# Patient Record
Sex: FEMALE | Race: WHITE | NOT HISPANIC OR LATINO | Employment: OTHER | RURAL
[De-identification: names, ages, dates, MRNs, and addresses within clinical notes are randomized per-mention and may not be internally consistent; named-entity substitution may affect disease eponyms.]

---

## 2020-12-07 ENCOUNTER — HISTORICAL (OUTPATIENT)
Dept: ADMINISTRATIVE | Facility: HOSPITAL | Age: 65
End: 2020-12-07

## 2021-11-19 VITALS
BODY MASS INDEX: 14.99 KG/M2 | HEIGHT: 65 IN | WEIGHT: 90 LBS | SYSTOLIC BLOOD PRESSURE: 142 MMHG | HEART RATE: 93 BPM | DIASTOLIC BLOOD PRESSURE: 78 MMHG

## 2022-01-25 ENCOUNTER — OFFICE VISIT (OUTPATIENT)
Dept: CARDIOLOGY | Facility: CLINIC | Age: 67
End: 2022-01-25
Payer: MEDICARE

## 2022-01-25 VITALS
HEIGHT: 65 IN | WEIGHT: 82 LBS | DIASTOLIC BLOOD PRESSURE: 74 MMHG | SYSTOLIC BLOOD PRESSURE: 112 MMHG | OXYGEN SATURATION: 96 % | BODY MASS INDEX: 13.66 KG/M2 | HEART RATE: 73 BPM

## 2022-01-25 DIAGNOSIS — I10 HYPERTENSION, UNSPECIFIED TYPE: ICD-10-CM

## 2022-01-25 DIAGNOSIS — I25.10 CORONARY ARTERY DISEASE, UNSPECIFIED VESSEL OR LESION TYPE, UNSPECIFIED WHETHER ANGINA PRESENT, UNSPECIFIED WHETHER NATIVE OR TRANSPLANTED HEART: Primary | ICD-10-CM

## 2022-01-25 DIAGNOSIS — E78.5 HYPERLIPIDEMIA, UNSPECIFIED HYPERLIPIDEMIA TYPE: ICD-10-CM

## 2022-01-25 DIAGNOSIS — I25.10 CORONARY ARTERY DISEASE INVOLVING NATIVE CORONARY ARTERY OF NATIVE HEART WITHOUT ANGINA PECTORIS: ICD-10-CM

## 2022-01-25 DIAGNOSIS — I25.2 HISTORY OF NON-ST ELEVATION MYOCARDIAL INFARCTION (NSTEMI): ICD-10-CM

## 2022-01-25 PROCEDURE — 99214 OFFICE O/P EST MOD 30 MIN: CPT | Mod: S$PBB,,, | Performed by: NURSE PRACTITIONER

## 2022-01-25 PROCEDURE — 93010 EKG 12-LEAD: ICD-10-PCS | Mod: S$PBB,,, | Performed by: INTERNAL MEDICINE

## 2022-01-25 PROCEDURE — 93005 ELECTROCARDIOGRAM TRACING: CPT | Mod: PBBFAC | Performed by: INTERNAL MEDICINE

## 2022-01-25 PROCEDURE — 99213 OFFICE O/P EST LOW 20 MIN: CPT | Mod: PBBFAC | Performed by: NURSE PRACTITIONER

## 2022-01-25 PROCEDURE — 99214 PR OFFICE/OUTPT VISIT, EST, LEVL IV, 30-39 MIN: ICD-10-PCS | Mod: S$PBB,,, | Performed by: NURSE PRACTITIONER

## 2022-01-25 PROCEDURE — 93010 ELECTROCARDIOGRAM REPORT: CPT | Mod: S$PBB,,, | Performed by: INTERNAL MEDICINE

## 2022-01-25 RX ORDER — TRAMADOL HYDROCHLORIDE 50 MG/1
50 TABLET ORAL EVERY 6 HOURS PRN
COMMUNITY
Start: 2022-01-21 | End: 2023-03-14 | Stop reason: SDUPTHER

## 2022-01-25 RX ORDER — ALPRAZOLAM 0.5 MG/1
0.5 TABLET ORAL NIGHTLY
COMMUNITY
Start: 2022-01-21 | End: 2023-05-16 | Stop reason: SDUPTHER

## 2022-01-25 NOTE — PROGRESS NOTES
"[facility] note        DATE OF SERVICE: 2022       PCP: Primary Doctor No      CHIEF COMPLAINT:   Chief Complaint   Patient presents with    Follow-up     Patient denies any cardiac complaints today.        HISTORY OF PRESENT ILLNESS:  Lorraine Brambila is a 66 y.o. female with a PMH of   Past Medical History:   Diagnosis Date    Asthma     COPD (chronic obstructive pulmonary disease)     Coronary artery disease     Fibromyalgia     Hyperlipidemia     Hypertension      who presents for routine follow up. She continues to lose weight. She states she has been "all over [place], [place] to all kinds of specialists and no one can find out why I eat all the time and keep losing weight.."  Chief Complaint   Patient presents with    Follow-up     Patient denies any cardiac complaints today.            PAST MEDICAL HISTORY:  Past Medical History:   Diagnosis Date    Asthma     COPD (chronic obstructive pulmonary disease)     Coronary artery disease     Fibromyalgia     Hyperlipidemia     Hypertension        PAST SURGICAL HISTORY:  Past Surgical History:   Procedure Laterality Date    CARDIAC CATHETERIZATION      HERNIA REPAIR      HYSTERECTOMY         SOCIAL HISTORY:  Social History     Socioeconomic History    Marital status:    Tobacco Use    Smoking status: Former Smoker     Packs/day: 1.00     Years: 50.00     Pack years: 50.00     Types: Cigarettes     Quit date: 2019     Years since quittin.8    Smokeless tobacco: Never Used   Substance and Sexual Activity    Alcohol use: Never    Drug use: Never       FAMILY HISTORY:  Family History   Problem Relation Age of Onset    Heart attack Father     Heart disease Father          ALLERGIES:  Review of patient's allergies indicates:  No Known Allergies     MEDICATIONS:    Current Outpatient Medications:     ALPRAZolam (XANAX) 0.5 MG tablet, , Disp: , Rfl:     traMADoL (ULTRAM) 50 mg tablet, , Disp: , Rfl:   Medications " "have not been reviewed and reconciled. She is to call back when she gets home to do this.     PHYSICAL EXAM:  /74 (BP Location: Right arm, Patient Position: Sitting)   Pulse 73   Ht 5' 5" (1.651 m)   Wt 37.2 kg (82 lb)   SpO2 96%   BMI 13.65 kg/m²   Wt Readings from Last 3 Encounters:   01/25/22 37.2 kg (82 lb)   11/30/20 40.8 kg (90 lb)      Body mass index is 13.65 kg/m².    Physical Exam  Vitals and nursing note reviewed.   Constitutional:       Appearance: Normal appearance.      Comments: Under weight   HENT:      Head: Normocephalic and atraumatic.   Eyes:      Pupils: Pupils are equal, round, and reactive to light.   Neck:      Vascular: No carotid bruit.   Cardiovascular:      Rate and Rhythm: Normal rate and regular rhythm.      Pulses: Normal pulses.      Heart sounds: Normal heart sounds.   Pulmonary:      Effort: Pulmonary effort is normal.      Breath sounds: Normal breath sounds.   Abdominal:      General: Bowel sounds are normal.      Palpations: Abdomen is soft.   Musculoskeletal:      Cervical back: Neck supple.      Right lower leg: No edema.      Left lower leg: No edema.   Skin:     General: Skin is warm and dry.      Capillary Refill: Capillary refill takes less than 2 seconds.   Neurological:      General: No focal deficit present.      Mental Status: She is alert and oriented to person, place, and time.   Psychiatric:         Mood and Affect: Mood normal.         Behavior: Behavior normal.         LABS REVIEWED:  No results found for: WBC, RBC, HGB, HCT, MCV, MCH, MCHC, RDW, PLT, MPV, NRBC, INR  No results found for: NA, K, CL, CO2, BUN, MG, PHOS  No results found for: CPK, AST, ALT  No results found for: GLU, HGBA1C  No results found for: CHOL, HDL, TRIG, CHOLHDL    CARDIAC STUDIES REVIEWED:EKG:RSR; HR 72 bpm; normal EKG      ASSESSMENT:   Patient Active Problem List   Diagnosis    Coronary artery disease    History of non-ST elevation myocardial infarction (NSTEMI)    " Hyperlipidemia    Hypertension            Problem List Items Addressed This Visit        Cardiac/Vascular    Coronary artery disease - Primary    Overview     KARYN mid LCx 4/2019         Relevant Orders    EKG 12-lead    History of non-ST elevation myocardial infarction (NSTEMI)    Overview     4/2019         Hyperlipidemia    Hypertension           PLAN:  Orders Placed This Encounter   Procedures    EKG 12-lead     Standing Status:   Future     Standing Expiration Date:   1/25/2023     Order Specific Question:   Diagnosis     Answer:   CAD (coronary artery disease) [[id number]]      RTC: 1 year

## 2022-05-04 PROCEDURE — 88305 TISSUE EXAM BY PATHOLOGIST: CPT | Mod: SUR

## 2022-05-04 PROCEDURE — 88305 TISSUE EXAM BY PATHOLOGIST: CPT | Mod: 26,,, | Performed by: PATHOLOGY

## 2022-05-04 PROCEDURE — 88305 PATHOLOGY, DERMATOLOGY: ICD-10-PCS | Mod: 26,,, | Performed by: PATHOLOGY

## 2022-05-05 ENCOUNTER — LAB REQUISITION (OUTPATIENT)
Dept: LAB | Facility: HOSPITAL | Age: 67
End: 2022-05-05
Payer: MEDICARE

## 2022-05-05 DIAGNOSIS — D49.2 NEOPLASM OF UNSPECIFIED BEHAVIOR OF BONE, SOFT TISSUE, AND SKIN: ICD-10-CM

## 2022-05-06 LAB
ESTROGEN SERPL-MCNC: NORMAL PG/ML
LAB AP GROSS DESCRIPTION: NORMAL
LAB AP LABORATORY NOTES: NORMAL
LAB AP SPEC A DDX: NORMAL
LAB AP SPEC A MORPHOLOGY: NORMAL
LAB AP SPEC A PROCEDURE: NORMAL
T3RU NFR SERPL: NORMAL %

## 2022-05-23 PROCEDURE — 88305 TISSUE EXAM BY PATHOLOGIST: CPT | Mod: SUR | Performed by: DERMATOLOGY

## 2022-05-23 PROCEDURE — 88305 TISSUE EXAM BY PATHOLOGIST: CPT | Mod: 26,,, | Performed by: PATHOLOGY

## 2022-05-23 PROCEDURE — 88305 PATHOLOGY, DERMATOLOGY: ICD-10-PCS | Mod: 26,,, | Performed by: PATHOLOGY

## 2022-05-24 ENCOUNTER — LAB REQUISITION (OUTPATIENT)
Dept: LAB | Facility: HOSPITAL | Age: 67
End: 2022-05-24
Attending: DERMATOLOGY
Payer: MEDICARE

## 2022-05-24 DIAGNOSIS — D04.4 CARCINOMA IN SITU OF SKIN OF SCALP AND NECK: ICD-10-CM

## 2023-01-30 ENCOUNTER — OFFICE VISIT (OUTPATIENT)
Dept: CARDIOLOGY | Facility: CLINIC | Age: 68
End: 2023-01-30
Payer: MEDICARE

## 2023-01-30 VITALS
SYSTOLIC BLOOD PRESSURE: 100 MMHG | BODY MASS INDEX: 13.6 KG/M2 | WEIGHT: 81.63 LBS | DIASTOLIC BLOOD PRESSURE: 60 MMHG | HEIGHT: 65 IN

## 2023-01-30 DIAGNOSIS — I25.10 CORONARY ARTERY DISEASE, UNSPECIFIED VESSEL OR LESION TYPE, UNSPECIFIED WHETHER ANGINA PRESENT, UNSPECIFIED WHETHER NATIVE OR TRANSPLANTED HEART: Primary | ICD-10-CM

## 2023-01-30 DIAGNOSIS — I10 HYPERTENSION, UNSPECIFIED TYPE: ICD-10-CM

## 2023-01-30 DIAGNOSIS — E78.5 HYPERLIPIDEMIA, UNSPECIFIED HYPERLIPIDEMIA TYPE: ICD-10-CM

## 2023-01-30 PROCEDURE — 99213 OFFICE O/P EST LOW 20 MIN: CPT | Mod: PBBFAC | Performed by: NURSE PRACTITIONER

## 2023-01-30 PROCEDURE — 99214 PR OFFICE/OUTPT VISIT, EST, LEVL IV, 30-39 MIN: ICD-10-PCS | Mod: S$PBB,,, | Performed by: NURSE PRACTITIONER

## 2023-01-30 PROCEDURE — 93005 ELECTROCARDIOGRAM TRACING: CPT | Mod: PBBFAC | Performed by: INTERNAL MEDICINE

## 2023-01-30 PROCEDURE — 99214 OFFICE O/P EST MOD 30 MIN: CPT | Mod: S$PBB,,, | Performed by: NURSE PRACTITIONER

## 2023-01-30 PROCEDURE — 93010 EKG 12-LEAD: ICD-10-PCS | Mod: S$PBB,,, | Performed by: INTERNAL MEDICINE

## 2023-01-30 PROCEDURE — 93010 ELECTROCARDIOGRAM REPORT: CPT | Mod: S$PBB,,, | Performed by: INTERNAL MEDICINE

## 2023-01-30 RX ORDER — METOPROLOL SUCCINATE 25 MG/1
25 TABLET, EXTENDED RELEASE ORAL DAILY
COMMUNITY
Start: 2022-11-06 | End: 2023-03-23 | Stop reason: SDUPTHER

## 2023-01-30 RX ORDER — CLOPIDOGREL BISULFATE 75 MG/1
75 TABLET ORAL DAILY
COMMUNITY
Start: 2022-11-06 | End: 2023-03-23 | Stop reason: SDUPTHER

## 2023-01-30 RX ORDER — ONDANSETRON 4 MG/1
4 TABLET, FILM COATED ORAL EVERY 8 HOURS PRN
COMMUNITY

## 2023-01-30 RX ORDER — MIRTAZAPINE 30 MG/1
30 TABLET, FILM COATED ORAL NIGHTLY
COMMUNITY
Start: 2022-11-06 | End: 2023-09-21

## 2023-01-30 RX ORDER — ATORVASTATIN CALCIUM 40 MG/1
40 TABLET, FILM COATED ORAL NIGHTLY
COMMUNITY
Start: 2022-11-06 | End: 2023-06-21 | Stop reason: SDUPTHER

## 2023-01-30 NOTE — PROGRESS NOTES
PCP: Primary Doctor No    Referring Provider:     Subjective:   Lorraine Brambila is a 67 y.o. female with hx of NSTEMI in 2019 with DEX prox LCx and residual disease of the RCA which did not appear to be r/t ischemia by Lexiscan 5/2019, who presents for routine follow up. She denies issues other than occasional, transient palpitations that has been ongoing for years.         Fhx:  Family History   Problem Relation Age of Onset    Heart attack Father     Heart disease Father       Shx:   Social History     Socioeconomic History    Marital status:    Tobacco Use    Smoking status: Former     Packs/day: 1.00     Years: 50.00     Pack years: 50.00     Types: Cigarettes     Quit date: 04/2019     Years since quitting: 3.8    Smokeless tobacco: Never   Substance and Sexual Activity    Alcohol use: Never    Drug use: Never        EKG 1/30/2023 RSR with HR 63 bpm; normal EKG  1/25/2022 RSR with HR 72 bpm; normal EKG    Lexiscan 5/21/2019  No definite findings of significant reversible ischemia can be detected by the study  Mild and fixed inferior wall defect wth no associated WMA suggestive of possible attenuation artifact appeared to be more likely than ischemia  Normal LV size and systolic function  LVEF 88%    ECHO 4/13/2019  Normal chamber size with possible normal LVSF with est LVEF 50%; poor quality no WMA could be assessed  Mild TR with est RVSP 36 mmHg  Pseudo-normalization of mitral inflow suggestive of LVDD  Increased LV filling pressure    [facility] 4/14/2019- Dr. Henderson  LM-short  LAD  with luminal irregularities; mLAD has 30% calcified plaque  LCx- mid LCx had 780% lesion with unstable plaque appearance   RCA is non-dominant and has a 95% prox lesion with remaining vessel diffusely diseased.  S/e KARYN mid Lcx with SURENDRA?I 3 flow post intervention      No results found for: NA, K, CL, CO2, BUN, CREATININE, CALCIUM, ANIONGAP, ESTGFRAFRICA, EGFRNONAA    No results found for: CHOL  No results found for: HDL  No results  "found for: LDLCALC  No results found for: TRIG  No results found for: CHOLHDL    No results found for: WBC, HGB, HCT, MCV, PLT        Current Outpatient Medications:     ALPRAZolam (XANAX) 0.5 MG tablet, Take 0.5 mg by mouth nightly., Disp: , Rfl:     atorvastatin (LIPITOR) 40 MG tablet, Take 40 mg by mouth every evening., Disp: , Rfl:     clopidogreL (PLAVIX) 75 mg tablet, Take 75 mg by mouth once daily., Disp: , Rfl:     metoprolol succinate (TOPROL-XL) 25 MG 24 hr tablet, Take 25 mg by mouth once daily., Disp: , Rfl:     mirtazapine (REMERON) 30 MG tablet, Take 30 mg by mouth every evening., Disp: , Rfl:     traMADoL (ULTRAM) 50 mg tablet, Take 50 mg by mouth every 6 (six) hours as needed., Disp: , Rfl:     ondansetron (ZOFRAN) 4 MG tablet, Take 4 mg by mouth every 8 (eight) hours as needed for Nausea., Disp: , Rfl:   Meds reviewed and reconciled.    Review of Systems   Respiratory:  Negative for shortness of breath.    Cardiovascular:  Positive for palpitations. Negative for chest pain, orthopnea, claudication, leg swelling and PND.   Neurological:  Negative for dizziness, loss of consciousness and weakness.       Objective:   /60 (BP Location: Left arm, Patient Position: Sitting)   Ht 5' 5" (1.651 m)   Wt 37 kg (81 lb 9.6 oz)   BMI 13.58 kg/m²     Physical Exam  Vitals and nursing note reviewed.   Constitutional:       Appearance: Normal appearance. She is normal weight.   HENT:      Head: Normocephalic and atraumatic.   Neck:      Vascular: No carotid bruit.   Cardiovascular:      Rate and Rhythm: Normal rate and regular rhythm.      Pulses: Normal pulses.      Heart sounds: Normal heart sounds.   Pulmonary:      Effort: Pulmonary effort is normal.      Breath sounds: Normal breath sounds.   Abdominal:      Palpations: Abdomen is soft.   Musculoskeletal:      Cervical back: Neck supple.      Right lower leg: No edema.      Left lower leg: No edema.   Skin:     General: Skin is warm and dry.      " Capillary Refill: Capillary refill takes less than 2 seconds.   Neurological:      Mental Status: She is alert.         Assessment:     1. Coronary artery disease, unspecified vessel or lesion type, unspecified whether angina present, unspecified whether native or transplanted heart  EKG 12-lead      2. Hyperlipidemia, unspecified hyperlipidemia type        3. Hypertension, unspecified type              Plan:   Coronary artery disease  Asymptomatic  Continue plavix/statin    Hyperlipidemia  Followed by PCP  Lipitor 40 mg po daily    Hypertension  Well controlled today 100/60 mmHg    RTC: 1 year

## 2023-03-14 ENCOUNTER — OFFICE VISIT (OUTPATIENT)
Dept: PRIMARY CARE CLINIC | Facility: CLINIC | Age: 68
End: 2023-03-14
Payer: MEDICARE

## 2023-03-14 ENCOUNTER — TELEPHONE (OUTPATIENT)
Dept: PRIMARY CARE CLINIC | Facility: CLINIC | Age: 68
End: 2023-03-14
Payer: MEDICARE

## 2023-03-14 VITALS
WEIGHT: 82 LBS | OXYGEN SATURATION: 97 % | BODY MASS INDEX: 13.66 KG/M2 | SYSTOLIC BLOOD PRESSURE: 100 MMHG | TEMPERATURE: 98 F | DIASTOLIC BLOOD PRESSURE: 70 MMHG | HEART RATE: 64 BPM | HEIGHT: 65 IN | RESPIRATION RATE: 16 BRPM

## 2023-03-14 DIAGNOSIS — Z11.59 NEED FOR HEPATITIS C SCREENING TEST: ICD-10-CM

## 2023-03-14 DIAGNOSIS — R39.15 URINARY URGENCY: ICD-10-CM

## 2023-03-14 DIAGNOSIS — Z00.00 ENCOUNTER FOR MEDICAL EXAMINATION TO ESTABLISH CARE: ICD-10-CM

## 2023-03-14 DIAGNOSIS — R10.2 PELVIC PRESSURE IN FEMALE: Primary | ICD-10-CM

## 2023-03-14 DIAGNOSIS — G89.29 OTHER CHRONIC PAIN: ICD-10-CM

## 2023-03-14 DIAGNOSIS — F11.90 CHRONIC, CONTINUOUS USE OF OPIOIDS: ICD-10-CM

## 2023-03-14 DIAGNOSIS — R63.6 UNDERWEIGHT: ICD-10-CM

## 2023-03-14 DIAGNOSIS — Z13.220 SCREENING FOR LIPID DISORDERS: ICD-10-CM

## 2023-03-14 DIAGNOSIS — R35.0 URINARY FREQUENCY: Primary | ICD-10-CM

## 2023-03-14 DIAGNOSIS — M81.0 OSTEOPOROSIS, UNSPECIFIED OSTEOPOROSIS TYPE, UNSPECIFIED PATHOLOGICAL FRACTURE PRESENCE: ICD-10-CM

## 2023-03-14 DIAGNOSIS — Z79.899 ENCOUNTER FOR LONG-TERM (CURRENT) USE OF MEDICATIONS: ICD-10-CM

## 2023-03-14 DIAGNOSIS — Z76.0 MEDICATION REFILL: ICD-10-CM

## 2023-03-14 DIAGNOSIS — Z12.31 ENCOUNTER FOR SCREENING MAMMOGRAM FOR MALIGNANT NEOPLASM OF BREAST: ICD-10-CM

## 2023-03-14 DIAGNOSIS — I25.2 HISTORY OF MYOCARDIAL INFARCTION: ICD-10-CM

## 2023-03-14 DIAGNOSIS — E78.5 HYPERLIPIDEMIA, UNSPECIFIED HYPERLIPIDEMIA TYPE: ICD-10-CM

## 2023-03-14 PROCEDURE — 99204 PR OFFICE/OUTPT VISIT, NEW, LEVL IV, 45-59 MIN: ICD-10-PCS | Mod: ,,, | Performed by: NURSE PRACTITIONER

## 2023-03-14 PROCEDURE — 99204 OFFICE O/P NEW MOD 45 MIN: CPT | Mod: ,,, | Performed by: NURSE PRACTITIONER

## 2023-03-14 RX ORDER — PANTOPRAZOLE SODIUM 40 MG/1
TABLET, DELAYED RELEASE ORAL
COMMUNITY
Start: 2022-11-06

## 2023-03-14 RX ORDER — TRAMADOL HYDROCHLORIDE 50 MG/1
50 TABLET ORAL EVERY 6 HOURS PRN
Qty: 28 TABLET | Refills: 0 | Status: SHIPPED | OUTPATIENT
Start: 2023-03-14 | End: 2023-05-16

## 2023-03-14 RX ORDER — TRAZODONE HYDROCHLORIDE 100 MG/1
100 TABLET ORAL NIGHTLY
Qty: 90 TABLET | Refills: 3 | Status: SHIPPED | OUTPATIENT
Start: 2023-03-14 | End: 2023-05-16

## 2023-03-14 NOTE — TELEPHONE ENCOUNTER
----- Message from GIUSEPPE Villalpando sent at 3/14/2023  4:01 PM CDT -----  UA clear. Ordering pelvic u/s for pelvic pressure/urinary urgency. Please schedule & let pt know.

## 2023-03-14 NOTE — PROGRESS NOTES
Pt is a 68 y/o WF here to establish care. Needs refills. Pt c/o OAB, states she has to urinate frequently. No incontinence. 2 vaginal deliveries. Will feel need to urinate but when she goes to restroom sometimes will not actually urinate. Pt reports cyst on kidney x15 years. Saw specialist in [place] who released her.    Past Medical History:   Diagnosis Date    Asthma     COPD (chronic obstructive pulmonary disease)     Coronary artery disease     Fibromyalgia     Hyperlipidemia     Hypertension      Review of Systems   Constitutional:  Negative for chills and fever.   Respiratory:  Negative for shortness of breath.    Cardiovascular:  Negative for chest pain.   Gastrointestinal:  Negative for abdominal pain, blood in stool, constipation, diarrhea, melena, nausea and vomiting.   Genitourinary:  Positive for frequency and urgency.   Skin:  Negative for rash.   Neurological:  Negative for weakness.   Psychiatric/Behavioral:  The patient has insomnia.      Physical Exam  Vitals reviewed. Exam conducted with a chaperone present.   Constitutional:       General: She is not in acute distress.     Appearance: Normal appearance.   HENT:      Head: Normocephalic.   Eyes:      General: No scleral icterus.     Pupils: Pupils are equal, round, and reactive to light.   Cardiovascular:      Rate and Rhythm: Normal rate.   Pulmonary:      Effort: Pulmonary effort is normal.   Abdominal:      General: There is no distension.      Palpations: Abdomen is soft.      Tenderness: There is no abdominal tenderness. There is no guarding or rebound.   Skin:     General: Skin is warm and dry.   Neurological:      General: No focal deficit present.      Mental Status: She is alert and oriented to person, place, and time. Mental status is at baseline.   Psychiatric:         Mood and Affect: Mood normal.         Behavior: Behavior normal.         Thought Content: Thought content normal.         Judgment: Judgment normal.     Plan  -labs as  ordered  -UA for urinary symptoms  -urology referral if UA neg and symptoms persist  -mammogram - last one 2 years ago here at [facility]  -DEXA - last one 2 years ago at [facility], hx osteoporosis, no hx of scoliosis  -refills as ordered. Pt wants to switch Remeron back to trazodone for sleep, states helped better in past. Stopping xanax to start trazodone. Pt also on Ultram. Wants 90 day supply with refills, states her daughter who is NP used to write for her. Instructed pt she needs to see pain tx for chronic narcotic refills, pt declines. Pt to let me know if she changes mind.  -RTC 3 months, sooner PRN

## 2023-03-17 ENCOUNTER — TELEPHONE (OUTPATIENT)
Dept: PRIMARY CARE CLINIC | Facility: CLINIC | Age: 68
End: 2023-03-17
Payer: MEDICARE

## 2023-03-17 NOTE — TELEPHONE ENCOUNTER
Pt notified of US appt.   Pt states she wanted AB to know that during her visit that she wasn't meaning to ask for a 90 day supply of ultram. She only wanted a 30 day supply. I told pt I would let AB know. Pt voiced understanding.

## 2023-03-23 ENCOUNTER — TELEPHONE (OUTPATIENT)
Dept: PRIMARY CARE CLINIC | Facility: CLINIC | Age: 68
End: 2023-03-23
Payer: MEDICARE

## 2023-03-23 RX ORDER — CLOPIDOGREL BISULFATE 75 MG/1
75 TABLET ORAL DAILY
Qty: 90 TABLET | Refills: 3 | Status: SHIPPED | OUTPATIENT
Start: 2023-03-23 | End: 2024-01-09 | Stop reason: SDUPTHER

## 2023-03-23 RX ORDER — METOPROLOL SUCCINATE 25 MG/1
25 TABLET, EXTENDED RELEASE ORAL DAILY
Qty: 90 TABLET | Refills: 3 | Status: SHIPPED | OUTPATIENT
Start: 2023-03-23 | End: 2023-12-12 | Stop reason: DRUGHIGH

## 2023-03-29 ENCOUNTER — HOSPITAL ENCOUNTER (OUTPATIENT)
Dept: RADIOLOGY | Facility: HOSPITAL | Age: 68
Discharge: HOME | End: 2023-03-29
Attending: NURSE PRACTITIONER
Payer: MEDICARE

## 2023-03-29 DIAGNOSIS — I71.40 ABDOMINAL AORTIC ANEURYSM (AAA) WITHOUT RUPTURE, UNSPECIFIED PART: ICD-10-CM

## 2023-03-29 DIAGNOSIS — I71.40 ABDOMINAL AORTIC ANEURYSM (AAA) WITHOUT RUPTURE, UNSPECIFIED PART: Primary | ICD-10-CM

## 2023-03-29 DIAGNOSIS — R39.15 URINARY URGENCY: ICD-10-CM

## 2023-03-29 DIAGNOSIS — R10.2 PELVIC PRESSURE IN FEMALE: ICD-10-CM

## 2023-03-29 PROCEDURE — 76856 US EXAM PELVIC COMPLETE: CPT | Mod: 26,,, | Performed by: RADIOLOGY

## 2023-03-29 PROCEDURE — 76856 US EXAM PELVIC COMPLETE: CPT | Mod: TC

## 2023-03-29 PROCEDURE — 76706 US ABDL AORTA SCREEN AAA: CPT | Mod: 26,,, | Performed by: RADIOLOGY

## 2023-03-29 PROCEDURE — 76706 US ABDL AORTA SCREEN AAA: CPT | Mod: TC

## 2023-03-29 PROCEDURE — 76706 US AAA SCREENING: ICD-10-PCS | Mod: 26,,, | Performed by: RADIOLOGY

## 2023-03-29 PROCEDURE — 76856 US PELVIS COMPLETE NON OB: ICD-10-PCS | Mod: 26,,, | Performed by: RADIOLOGY

## 2023-04-09 DIAGNOSIS — Z71.89 COMPLEX CARE COORDINATION: ICD-10-CM

## 2023-04-19 ENCOUNTER — OFFICE VISIT (OUTPATIENT)
Dept: SURGERY | Facility: CLINIC | Age: 68
End: 2023-04-19
Payer: MEDICARE

## 2023-04-19 DIAGNOSIS — I71.40 ABDOMINAL AORTIC ANEURYSM (AAA) WITHOUT RUPTURE, UNSPECIFIED PART: ICD-10-CM

## 2023-04-19 DIAGNOSIS — I71.43 INFRARENAL ABDOMINAL AORTIC ANEURYSM (AAA) WITHOUT RUPTURE: Primary | ICD-10-CM

## 2023-04-19 PROCEDURE — 99213 OFFICE O/P EST LOW 20 MIN: CPT | Mod: S$PBB,,, | Performed by: SURGERY

## 2023-04-19 PROCEDURE — 99213 PR OFFICE/OUTPT VISIT, EST, LEVL III, 20-29 MIN: ICD-10-PCS | Mod: S$PBB,,, | Performed by: SURGERY

## 2023-04-19 PROCEDURE — 99212 OFFICE O/P EST SF 10 MIN: CPT | Mod: PBBFAC | Performed by: SURGERY

## 2023-04-19 NOTE — PROGRESS NOTES
Subjective:       Patient ID: Lorraine Brambila is a 67 y.o. female.    Chief Complaint: No chief complaint on file.  Prefer for abdominal aortic aneurysms.  Ultrasound shows maximum diameter 2.9 cm.  She has a family history.  She said ultrasound for screening proximally aorto 1.9 mid aorta 2.9 distal aorta 2.7 right iliac 0.5 left iliac 0.7  family history includes Heart attack in her father; Heart disease in her father.  Past Medical History:   Diagnosis Date    Asthma     COPD (chronic obstructive pulmonary disease)     Coronary artery disease     Fibromyalgia     Hyperlipidemia     Hypertension       Past Surgical History:   Procedure Laterality Date    CARDIAC CATHETERIZATION      HERNIA REPAIR      HYSTERECTOMY         reports that she quit smoking about 4 years ago. Her smoking use included cigarettes. She has a 50.00 pack-year smoking history. She has never used smokeless tobacco. She reports that she does not drink alcohol and does not use drugs.   HPI  Review of Systems      Objective:      There were no vitals taken for this visit.   Physical Exam  Constitutional:       Appearance: Normal appearance.   Cardiovascular:      Rate and Rhythm: Normal rate.      Pulses: Normal pulses.   Skin:     General: Skin is warm and dry.      Capillary Refill: Capillary refill takes less than 2 seconds.   Neurological:      General: No focal deficit present.      Mental Status: She is alert.   Psychiatric:         Mood and Affect: Mood normal.         Behavior: Behavior normal.         Thought Content: Thought content normal.         Judgment: Judgment normal.         Assessment:       1. Infrarenal abdominal aortic aneurysm (AAA) without rupture    2. Abdominal aortic aneurysm (AAA) without rupture, unspecified part        Plan:       We have educated patient follow up in a year with a repeat ultrasound of her abdominal aorta, we will get a chest x-ray day due to her strong family history of aneurysms and also a  family history of thoracic aneurysms

## 2023-04-26 ENCOUNTER — HOSPITAL ENCOUNTER (OUTPATIENT)
Dept: RADIOLOGY | Facility: HOSPITAL | Age: 68
Discharge: HOME | End: 2023-04-26
Attending: SURGERY
Payer: MEDICARE

## 2023-04-26 DIAGNOSIS — I71.40 ABDOMINAL AORTIC ANEURYSM (AAA) WITHOUT RUPTURE, UNSPECIFIED PART: ICD-10-CM

## 2023-04-26 PROCEDURE — 71046 XR CHEST PA AND LATERAL: ICD-10-PCS | Mod: 26,,, | Performed by: RADIOLOGY

## 2023-04-26 PROCEDURE — 71046 X-RAY EXAM CHEST 2 VIEWS: CPT | Mod: 26,,, | Performed by: RADIOLOGY

## 2023-04-26 PROCEDURE — 71046 X-RAY EXAM CHEST 2 VIEWS: CPT | Mod: TC

## 2023-05-04 ENCOUNTER — HOSPITAL ENCOUNTER (OUTPATIENT)
Dept: RADIOLOGY | Facility: HOSPITAL | Age: 68
Discharge: HOME | End: 2023-05-04
Attending: NURSE PRACTITIONER
Payer: MEDICARE

## 2023-05-04 ENCOUNTER — OFFICE VISIT (OUTPATIENT)
Dept: SURGERY | Facility: CLINIC | Age: 68
End: 2023-05-04
Payer: MEDICARE

## 2023-05-04 VITALS — BODY MASS INDEX: 13.5 KG/M2 | HEIGHT: 66 IN | WEIGHT: 84 LBS

## 2023-05-04 VITALS — WEIGHT: 84 LBS | BODY MASS INDEX: 13.5 KG/M2 | HEIGHT: 66 IN

## 2023-05-04 DIAGNOSIS — Z12.31 ENCOUNTER FOR SCREENING MAMMOGRAM FOR MALIGNANT NEOPLASM OF BREAST: ICD-10-CM

## 2023-05-04 DIAGNOSIS — I71.40 ABDOMINAL AORTIC ANEURYSM (AAA) WITHOUT RUPTURE, UNSPECIFIED PART: Primary | ICD-10-CM

## 2023-05-04 DIAGNOSIS — M81.0 OSTEOPOROSIS, UNSPECIFIED OSTEOPOROSIS TYPE, UNSPECIFIED PATHOLOGICAL FRACTURE PRESENCE: ICD-10-CM

## 2023-05-04 PROCEDURE — 77080 DXA BONE DENSITY AXIAL SKELETON 1 OR MORE SITES: ICD-10-PCS | Mod: 26,,, | Performed by: RADIOLOGY

## 2023-05-04 PROCEDURE — 77080 DXA BONE DENSITY AXIAL: CPT | Mod: TC

## 2023-05-04 PROCEDURE — 77067 SCR MAMMO BI INCL CAD: CPT | Mod: TC

## 2023-05-04 PROCEDURE — 99212 PR OFFICE/OUTPT VISIT, EST, LEVL II, 10-19 MIN: ICD-10-PCS | Mod: S$PBB,,, | Performed by: SURGERY

## 2023-05-04 PROCEDURE — 99213 OFFICE O/P EST LOW 20 MIN: CPT | Mod: PBBFAC,25 | Performed by: SURGERY

## 2023-05-04 PROCEDURE — 99212 OFFICE O/P EST SF 10 MIN: CPT | Mod: S$PBB,,, | Performed by: SURGERY

## 2023-05-04 PROCEDURE — 77080 DXA BONE DENSITY AXIAL: CPT | Mod: 26,,, | Performed by: RADIOLOGY

## 2023-05-08 DIAGNOSIS — M81.0 OSTEOPOROSIS, UNSPECIFIED OSTEOPOROSIS TYPE, UNSPECIFIED PATHOLOGICAL FRACTURE PRESENCE: Primary | ICD-10-CM

## 2023-05-16 ENCOUNTER — OFFICE VISIT (OUTPATIENT)
Dept: PRIMARY CARE CLINIC | Facility: CLINIC | Age: 68
End: 2023-05-16
Payer: MEDICARE

## 2023-05-16 VITALS
HEART RATE: 73 BPM | OXYGEN SATURATION: 95 % | HEIGHT: 66 IN | SYSTOLIC BLOOD PRESSURE: 110 MMHG | BODY MASS INDEX: 13.13 KG/M2 | DIASTOLIC BLOOD PRESSURE: 72 MMHG | TEMPERATURE: 97 F | WEIGHT: 81.69 LBS

## 2023-05-16 DIAGNOSIS — Z79.899 OTHER LONG TERM (CURRENT) DRUG THERAPY: ICD-10-CM

## 2023-05-16 DIAGNOSIS — Z87.891 ENCOUNTER FOR SCREENING FOR MALIGNANT NEOPLASM OF LUNG IN FORMER SMOKER WHO QUIT IN PAST 15 YEARS WITH 30 PACK YEAR HISTORY OR GREATER: Primary | ICD-10-CM

## 2023-05-16 DIAGNOSIS — Z12.2 ENCOUNTER FOR SCREENING FOR MALIGNANT NEOPLASM OF LUNG IN FORMER SMOKER WHO QUIT IN PAST 15 YEARS WITH 30 PACK YEAR HISTORY OR GREATER: Primary | ICD-10-CM

## 2023-05-16 DIAGNOSIS — R53.83 FATIGUE, UNSPECIFIED TYPE: ICD-10-CM

## 2023-05-16 DIAGNOSIS — Z12.11 ENCOUNTER FOR SCREENING COLONOSCOPY: ICD-10-CM

## 2023-05-16 PROCEDURE — 99213 OFFICE O/P EST LOW 20 MIN: CPT | Mod: ,,, | Performed by: NURSE PRACTITIONER

## 2023-05-16 PROCEDURE — 99213 PR OFFICE/OUTPT VISIT, EST, LEVL III, 20-29 MIN: ICD-10-PCS | Mod: ,,, | Performed by: NURSE PRACTITIONER

## 2023-05-16 RX ORDER — NITROGLYCERIN 0.4 MG/1
0.4 TABLET SUBLINGUAL EVERY 5 MIN PRN
Qty: 90 TABLET | Refills: 3 | Status: SHIPPED | OUTPATIENT
Start: 2023-05-16 | End: 2024-05-15

## 2023-05-16 RX ORDER — ALPRAZOLAM 0.5 MG/1
0.5 TABLET ORAL NIGHTLY
Qty: 20 TABLET | Refills: 0 | Status: SHIPPED | OUTPATIENT
Start: 2023-05-16 | End: 2023-06-07 | Stop reason: SDUPTHER

## 2023-05-16 NOTE — PROGRESS NOTES
Pt here for f/u. Sent to urology for urinary symptoms and AAA was found on imaging. Has seen Dr. Patterson, plans to have yearly re-evals. Pt reports colonoscopy 10 years ago in [place]. C/o chronic fatigue.    Past Medical History:   Diagnosis Date    Asthma     COPD (chronic obstructive pulmonary disease)     Coronary artery disease     Fibromyalgia     Hyperlipidemia     Hypertension      Review of Systems   Constitutional:  Positive for malaise/fatigue. Negative for chills and fever.   Respiratory:  Negative for shortness of breath.    Cardiovascular:  Negative for chest pain.   Gastrointestinal:  Negative for abdominal pain, nausea and vomiting.   Skin:  Negative for rash.   Neurological:  Negative for weakness.     Physical Exam  Vitals reviewed. Exam conducted with a chaperone present.   Constitutional:       General: She is not in acute distress.     Appearance: Normal appearance.   HENT:      Head: Normocephalic.      Mouth/Throat:      Mouth: Mucous membranes are moist.      Pharynx: Oropharynx is clear.   Eyes:      General: No scleral icterus.     Pupils: Pupils are equal, round, and reactive to light.   Cardiovascular:      Rate and Rhythm: Normal rate.   Pulmonary:      Effort: Pulmonary effort is normal. No respiratory distress.      Breath sounds: Normal breath sounds.   Abdominal:      General: There is no distension.      Palpations: Abdomen is soft.      Tenderness: There is no abdominal tenderness. There is no guarding or rebound.   Skin:     General: Skin is warm and dry.   Neurological:      General: No focal deficit present.      Mental Status: She is alert and oriented to person, place, and time. Mental status is at baseline.   Psychiatric:         Mood and Affect: Mood normal.         Behavior: Behavior normal.         Thought Content: Thought content normal.         Judgment: Judgment normal.     Plan  -LDCT for lung cancer screen  -due for c-scope  -requests Tramadol refill again, long  discussion w/ pt last time about chronic narcs, instructed pt she needs pain tx for chronic narcs, she does not wish to have a referral at this time. Asks for something non-narc for fibromyalgia, offered SSRI or SNRI + TCA and pt declines, wants to stay on remeron  -stopped xanax and remeron last time to take trazodone. today will stop trazodone as pt states isn't working and restart xanax and remeron  -TSH and B12 for fatigue  -RTC 6 months, sooner PRN

## 2023-05-16 NOTE — ADDENDUM NOTE
Addended by: CYNTHIA ARROYO on: 5/16/2023 01:20 PM     Modules accepted: Orders, Level of Service

## 2023-05-18 ENCOUNTER — OFFICE VISIT (OUTPATIENT)
Dept: PRIMARY CARE CLINIC | Facility: CLINIC | Age: 68
End: 2023-05-18
Payer: MEDICARE

## 2023-05-18 VITALS
DIASTOLIC BLOOD PRESSURE: 69 MMHG | WEIGHT: 81.81 LBS | OXYGEN SATURATION: 97 % | SYSTOLIC BLOOD PRESSURE: 121 MMHG | HEIGHT: 66 IN | HEART RATE: 86 BPM | RESPIRATION RATE: 18 BRPM | TEMPERATURE: 98 F | BODY MASS INDEX: 13.15 KG/M2

## 2023-05-18 DIAGNOSIS — I25.10 CORONARY ARTERY DISEASE INVOLVING NATIVE CORONARY ARTERY OF NATIVE HEART WITHOUT ANGINA PECTORIS: ICD-10-CM

## 2023-05-18 DIAGNOSIS — G47.00 INSOMNIA, UNSPECIFIED TYPE: ICD-10-CM

## 2023-05-18 DIAGNOSIS — R00.2 PALPITATIONS: Primary | ICD-10-CM

## 2023-05-18 DIAGNOSIS — E78.49 OTHER HYPERLIPIDEMIA: ICD-10-CM

## 2023-05-18 DIAGNOSIS — R03.0 ELEVATED BLOOD PRESSURE READING: ICD-10-CM

## 2023-05-18 DIAGNOSIS — K21.9 GASTROESOPHAGEAL REFLUX DISEASE, UNSPECIFIED WHETHER ESOPHAGITIS PRESENT: ICD-10-CM

## 2023-05-18 DIAGNOSIS — F41.9 ANXIETY: ICD-10-CM

## 2023-05-18 DIAGNOSIS — I25.2 HISTORY OF NON-ST ELEVATION MYOCARDIAL INFARCTION (NSTEMI): ICD-10-CM

## 2023-05-18 PROCEDURE — 99202 OFFICE O/P NEW SF 15 MIN: CPT | Mod: ,,, | Performed by: NURSE PRACTITIONER

## 2023-05-18 PROCEDURE — 99202 PR OFFICE/OUTPT VISIT, NEW, LEVL II, 15-29 MIN: ICD-10-PCS | Mod: ,,, | Performed by: NURSE PRACTITIONER

## 2023-05-18 NOTE — PROGRESS NOTES
[facility]  Primary Care       PATIENT NAME: Lorraine Brambila   : 1955    AGE: 67 y.o. DATE: 2023    MRN: [id number]        Reason for Visit / Chief Complaint:  OTHER (PT IS CHANGING BACK  TO LOCAL DOCTOR.), Hypertension, and Abdominal Pain (BLOATED , GAS.)     Subjective:     HPI: Patient here to establish care.     States she has history of heart attack, GERD, insomnia, palpitations, fibromyalgia.     States she recently found out she has abdominal aortic aneurysm.    Patient states her brother recently passed away  (one month ago) and states she has had some anxiety.     Patient requesting refill on Ultram for fibromyalgia.     Called [facility] and confirmed medication: Ultram last filled by Dr. Vega on 4/10/2023.     Patient recently saw GIUSEPPE Bal on 2023. Ultram was discontinued at that visit.     Hypertension  Pertinent negatives include no chest pain, headaches or shortness of breath.   Abdominal Pain  Pertinent negatives include no constipation, diarrhea, dysuria, fever, headaches, nausea or vomiting.        Review of Systems: Review of Systems   Constitutional:  Negative for chills, fatigue and fever.   Respiratory:  Negative for cough, chest tightness and shortness of breath.    Cardiovascular:  Negative for chest pain.   Gastrointestinal:  Positive for abdominal pain (patient states she feels like she has gas). Negative for constipation, diarrhea, nausea and vomiting.   Genitourinary:  Negative for dysuria.   Musculoskeletal:  Negative for gait problem.   Skin:  Negative for rash.   Neurological:  Negative for headaches.        Review of patient's allergies indicates:  No Known Allergies     Med List:  Current Outpatient Medications on File Prior to Visit   Medication Sig Dispense Refill    ALPRAZolam (XANAX) 0.5 MG tablet Take 1 tablet (0.5 mg total) by mouth nightly. 20 tablet 0    atorvastatin (LIPITOR) 40 MG tablet Take 40 mg by mouth every  "evening.      clopidogreL (PLAVIX) 75 mg tablet Take 1 tablet (75 mg total) by mouth once daily. 90 tablet 3    metoprolol succinate (TOPROL-XL) 25 MG 24 hr tablet Take 1 tablet (25 mg total) by mouth once daily. 90 tablet 3    mirtazapine (REMERON) 30 MG tablet Take 30 mg by mouth every evening.      nitroGLYCERIN (NITROSTAT) 0.4 MG SL tablet Place 1 tablet (0.4 mg total) under the tongue every 5 (five) minutes as needed for Chest pain. 90 tablet 3    ondansetron (ZOFRAN) 4 MG tablet Take 4 mg by mouth every 8 (eight) hours as needed for Nausea.      pantoprazole (PROTONIX) 40 MG tablet        No current facility-administered medications on file prior to visit.       Medical/Social/Family History:  Past Medical History:   Diagnosis Date    Asthma     COPD (chronic obstructive pulmonary disease)     Coronary artery disease     Fibromyalgia     Hyperlipidemia     Hypertension       Social History     Tobacco Use   Smoking Status Former    Packs/day: 1.00    Years: 50.00    Pack years: 50.00    Types: Cigarettes    Quit date: 2019    Years since quittin.1   Smokeless Tobacco Never      Social History     Substance and Sexual Activity   Alcohol Use Never       Family History   Problem Relation Age of Onset    Heart attack Father     Heart disease Father       Past Surgical History:   Procedure Laterality Date    CARDIAC CATHETERIZATION      HERNIA REPAIR      HYSTERECTOMY      OOPHORECTOMY          There is no immunization history on file for this patient.       Objective:      Vitals:    23 1508 23 1514 23 1549   BP: (!) 178/84 (!) 147/100 121/69   BP Location: Right arm Left arm Left arm   Patient Position: Sitting Sitting Sitting   BP Method: Medium (Automatic) Medium (Manual) Medium (Manual)   Pulse: 86     Resp: 18     Temp: 98 °F (36.7 °C)     TempSrc: Oral     SpO2: 97%     Weight: 37.1 kg (81 lb 12.8 oz)     Height: 5' 6" (1.676 m)       Body mass index is 13.2 kg/m².     Physical Exam: " Physical Exam  Vitals and nursing note reviewed.   Constitutional:       General: She is not in acute distress.     Appearance: Normal appearance. She is not ill-appearing.   HENT:      Head: Normocephalic.      Mouth/Throat:      Mouth: Mucous membranes are moist.   Eyes:      Extraocular Movements: Extraocular movements intact.      Conjunctiva/sclera: Conjunctivae normal.      Pupils: Pupils are equal, round, and reactive to light.   Cardiovascular:      Rate and Rhythm: Normal rate and regular rhythm.      Heart sounds: Normal heart sounds.   Pulmonary:      Effort: Pulmonary effort is normal.      Breath sounds: Normal breath sounds.   Abdominal:      General: Bowel sounds are normal. There is no distension.      Palpations: Abdomen is soft.      Tenderness: There is no abdominal tenderness.   Musculoskeletal:         General: Normal range of motion.      Cervical back: Normal range of motion and neck supple.   Skin:     General: Skin is warm and dry.   Neurological:      General: No focal deficit present.      Mental Status: She is alert and oriented to person, place, and time.      Gait: Gait normal.   Psychiatric:         Mood and Affect: Mood normal.         Behavior: Behavior normal.              Assessment:          ICD-10-CM ICD-9-CM   1. Palpitations  R00.2 785.1   2. History of non-ST elevation myocardial infarction (NSTEMI)  I25.2 412   3. Coronary artery disease involving native coronary artery of native heart without angina pectoris  I25.10 414.01   4. Other hyperlipidemia  E78.49 272.4   5. Gastroesophageal reflux disease, unspecified whether esophagitis present  K21.9 530.81   6. Insomnia, unspecified type  G47.00 780.52   7. Anxiety  F41.9 300.00   8. Elevated blood pressure reading  R03.0 796.2        Plan:       Palpitations    History of non-ST elevation myocardial infarction (NSTEMI)    Coronary artery disease involving native coronary artery of native heart without angina pectoris    Other  hyperlipidemia    Gastroesophageal reflux disease, unspecified whether esophagitis present    Insomnia, unspecified type    Anxiety    Elevated blood pressure reading          New & refilled meds:  Requested Prescriptions      No prescriptions requested or ordered in this encounter       Follow up in about 1 month (around 6/18/2023), or if symptoms worsen or fail to improve, for palpitations, anxiety.     There are no Patient Instructions on file for this visit.         Signature: Alexsander Johnson DNP, FNP-C

## 2023-05-23 ENCOUNTER — TELEPHONE (OUTPATIENT)
Dept: PRIMARY CARE CLINIC | Facility: CLINIC | Age: 68
End: 2023-05-23
Payer: MEDICARE

## 2023-05-23 DIAGNOSIS — G89.29 OTHER CHRONIC PAIN: Primary | ICD-10-CM

## 2023-05-23 NOTE — TELEPHONE ENCOUNTER
----- Message from aSmira Daniels sent at 5/22/2023 11:11 AM CDT -----  Patient called concerning a script for Tramadol

## 2023-06-06 DIAGNOSIS — F41.9 ANXIETY: ICD-10-CM

## 2023-06-06 DIAGNOSIS — G47.00 INSOMNIA, UNSPECIFIED TYPE: Primary | ICD-10-CM

## 2023-06-06 RX ORDER — ALPRAZOLAM 0.5 MG/1
0.5 TABLET ORAL NIGHTLY
Qty: 20 TABLET | Refills: 2 | OUTPATIENT
Start: 2023-06-06

## 2023-06-07 DIAGNOSIS — F41.9 ANXIETY: Primary | ICD-10-CM

## 2023-06-07 DIAGNOSIS — G47.00 INSOMNIA, UNSPECIFIED TYPE: ICD-10-CM

## 2023-06-07 RX ORDER — ALPRAZOLAM 0.5 MG/1
0.5 TABLET ORAL NIGHTLY
Qty: 30 TABLET | Refills: 2 | Status: SHIPPED | OUTPATIENT
Start: 2023-06-07 | End: 2023-09-18

## 2023-06-21 ENCOUNTER — OFFICE VISIT (OUTPATIENT)
Dept: PRIMARY CARE CLINIC | Facility: CLINIC | Age: 68
End: 2023-06-21
Payer: MEDICARE

## 2023-06-21 VITALS
DIASTOLIC BLOOD PRESSURE: 82 MMHG | OXYGEN SATURATION: 99 % | WEIGHT: 79.81 LBS | TEMPERATURE: 98 F | BODY MASS INDEX: 12.83 KG/M2 | SYSTOLIC BLOOD PRESSURE: 136 MMHG | HEIGHT: 66 IN | HEART RATE: 67 BPM | RESPIRATION RATE: 18 BRPM

## 2023-06-21 DIAGNOSIS — F41.9 ANXIETY: Primary | ICD-10-CM

## 2023-06-21 DIAGNOSIS — E78.00 HYPERCHOLESTEROLEMIA: ICD-10-CM

## 2023-06-21 DIAGNOSIS — R00.2 PALPITATION: ICD-10-CM

## 2023-06-21 PROCEDURE — 99213 PR OFFICE/OUTPT VISIT, EST, LEVL III, 20-29 MIN: ICD-10-PCS | Mod: ,,, | Performed by: NURSE PRACTITIONER

## 2023-06-21 PROCEDURE — 99213 OFFICE O/P EST LOW 20 MIN: CPT | Mod: ,,, | Performed by: NURSE PRACTITIONER

## 2023-06-21 RX ORDER — ATORVASTATIN CALCIUM 40 MG/1
40 TABLET, FILM COATED ORAL NIGHTLY
Qty: 90 TABLET | Refills: 2 | Status: SHIPPED | OUTPATIENT
Start: 2023-06-21 | End: 2024-03-22

## 2023-06-21 NOTE — PATIENT INSTRUCTIONS
"Patient Education       Controlling Your Blood Pressure Through Lifestyle   The Basics   Written by the doctors and editors at [facility]   What does my lifestyle have to do with my blood pressure? -- The things you do and the foods you eat have a big effect on your blood pressure and your overall health. Following the right lifestyle can:  Lower your blood pressure or keep you from getting high blood pressure in the first place  Reduce your need for blood pressure medicines  Make medicines for high blood pressure work better, if you do take them  Lower the chances that you'll have a heart attack or stroke, or develop kidney disease  Which lifestyle choices will help lower my blood pressure? -- Here's what you can do:  Lose weight (if you are overweight)  Choose a diet rich in fruits, vegetables, and low-fat dairy products, and low in meats, sweets, and refined grains  Eat less salt (sodium)  Do something active for at least 30 minutes a day on most days of the week  Limit the amount of alcohol you drink  If you have high blood pressure, it's also very important to quit smoking (if you smoke). Quitting smoking might not bring your blood pressure down. But it will lower the chances that you'll have a heart attack or stroke, and it will help you feel better and live longer.  Start low and go slow -- The changes listed above might sound like a lot, but don't worry. You don't have to change everything all at once. The key to improving your lifestyle is to "start low and go slow." Choose 1 small, specific thing to change and try doing it for a while. If it works for you, keep doing it until it becomes a habit. If it doesn't, don't give up. Choose something else to change and see how that goes.  Let's say, for example, that you would like to improve your diet. If you're the type of person who eats cheeseburgers and French fries all the time, you can't switch to eating just salads from one day to the next. When people try to " "make changes like that, they often fail. Then they feel frustrated and tend to give up. So instead of trying to change everything about your diet in 1 day, change 1 or 2 small things about your diet and give yourself time to get used to those changes. For instance, keep the cheeseburger but give up the French fries. Or eat the same things but cut your portions in half.  As you find things that you are able to change and stick with, keep adding new changes. In time, you will see that you can actually change a lot. You just have to get used to the changes slowly.  Lose weight -- When people think about losing weight, they sometimes make it more complicated than it really is. To lose weight, you have to either eat less or move more. If you do both of those things, it's even better. But there is no single weight-loss diet or activity that's better than any other. When it comes to weight loss, the most effective plan is the one that you'll stick with.  Improve your diet -- There is no single diet that is right for everyone. But in general, a healthy diet can include:  Lots of fruits, vegetables, and whole grains  Some beans, peas, lentils, chickpeas, and similar foods  Some nuts, such as walnuts, almonds, and peanuts  Fat-free or low-fat milk and milk products  Some fish  To have a healthy diet, it's also important to limit or avoid sugar, sweets, meats, and refined grains. (Refined grains are found in white bread, white rice, most forms of pasta, and most packaged "snack" foods.)  Reduce salt -- Many people think that eating a low-sodium diet means avoiding the salt shaker and not adding salt when cooking. The truth is, not adding salt at the table or when you cook will only help a little. Almost all of the sodium you eat is already in the food you buy at the grocery store or at restaurants (figure 1).  The most important thing you can do to cut down on sodium is to eat less processed food. That means that you should " "avoid most foods that are sold in cans, boxes, jars, and bags. You should also eat in restaurants less often.  To reduce the amount of sodium you get, buy fresh or fresh-frozen fruits, vegetables, and meats. (Fresh-frozen foods have had nothing added to them before freezing.) Then you can make meals at home, from scratch, with these ingredients.  As with the other changes, don't try to cut out salt all at once. Instead, choose 1 or 2 foods that have a lot of sodium and try to replace them with low-sodium choices. When you get used to those low-sodium options, find another food or 2 to change. Then keep going, until all the foods you eat are sodium-free or low in sodium.  Become more active -- If you want to be more active, you don't have to go to the gym or get all sweaty. It is possible to increase your activity level while doing everyday things you enjoy. Walking, gardening, and dancing are just a few of the things that you might try. As with all the other changes, the key is not to do too much too fast. If you don't do any activity now, start by walking for just a few minutes every other day. Do that for a few weeks. If you stick with it, try doing it for longer. But if you find that you don't like walking, try a different activity.  Drink less alcohol -- If you are a woman, do not have more than 1 "standard drink" of alcohol a day. If you are a man, do not have more than 2. A "standard drink" is:  A can or bottle that has 12 ounces of beer  A glass that has 5 ounces of wine  A shot that has 1.5 ounces of whiskey  Where should I start? -- If you want to improve your lifestyle, start by making the changes that you think would be easiest for you. If you used to exercise and just got out of the habit, maybe it would be easy for you to start exercising again. Or if you actually like cooking meals from scratch, maybe the first thing you should focus on is eating home-cooked meals that are low in sodium.  Whatever you " "tackle first, choose specific, realistic goals, and give yourself a deadline. For example, do not decide that you are going to "exercise more." Instead, decide that you are going to walk for 10 minutes on Monday, Wednesday, and Friday, and that you are going to do this for the next 2 weeks.  When lifestyle changes are too general, people have a hard time following through.  Now go. You can do it!  All topics are updated as new evidence becomes available and our peer review process is complete.  This topic retrieved from [employer] on: Sep 21, 2021.  Topic 35269 Version 8.0  Release: 29.4.2 - C29.263  © 2021 UpToDate, Inc. and/or its affiliates. All rights reserved.  figure 1: Sources of sodium in your diet     Graphic 64341 Version 2.0    Consumer Information Use and Disclaimer   This information is not specific medical advice and does not replace information you receive from your health care provider. This is only a brief summary of general information. It does NOT include all information about conditions, illnesses, injuries, tests, procedures, treatments, therapies, discharge instructions or life-style choices that may apply to you. You must talk with your health care provider for complete information about your health and treatment options. This information should not be used to decide whether or not to accept your health care provider's advice, instructions or recommendations. Only your health care provider has the knowledge and training to provide advice that is right for you. The use of this information is governed by the [employer] End User License Agreement, available at [email or web address].The use of [employer] content is governed by the [employer] Terms of Use. ©2021 UpToDate, Inc. All rights reserved.  Copyright   © 2021 UpToDate, Inc. and/or its affiliates. All rights reserved.    "

## 2023-06-21 NOTE — PROGRESS NOTES
[facility]  Primary Care       PATIENT NAME: Lorraine Brambila   : 1955    AGE: 68 y.o. DATE: 2023    MRN: [id number]        Reason for Visit / Chief Complaint:  Follow-up (1 month recheck palpitation )     Subjective:     HPI: Patient here for follow up palpitations and anxiety.       Taking xanax and remeron at night; patient states she has been taking these medications together for 10 years without any issues.     Patient states she has palpitations every now and then, but takes Metoprolol for it. States she has a cardiologist, SAGRARIO Richardson.          Follow-up  Pertinent negatives include no abdominal pain, chest pain, chills, coughing, fatigue, fever, headaches, nausea, rash or vomiting.        Review of Systems: Review of Systems   Constitutional:  Negative for chills, fatigue and fever.   Respiratory:  Negative for cough, chest tightness and shortness of breath.    Cardiovascular:  Negative for chest pain.   Gastrointestinal:  Negative for abdominal pain, constipation, diarrhea, nausea and vomiting.   Genitourinary:  Negative for dysuria.   Musculoskeletal:  Negative for gait problem.   Skin:  Negative for rash.   Neurological:  Negative for headaches.        Review of patient's allergies indicates:  No Known Allergies     Med List:  Current Outpatient Medications on File Prior to Visit   Medication Sig Dispense Refill    ALPRAZolam (XANAX) 0.5 MG tablet Take 1 tablet (0.5 mg total) by mouth nightly. 30 tablet 2    clopidogreL (PLAVIX) 75 mg tablet Take 1 tablet (75 mg total) by mouth once daily. 90 tablet 3    metoprolol succinate (TOPROL-XL) 25 MG 24 hr tablet Take 1 tablet (25 mg total) by mouth once daily. 90 tablet 3    mirtazapine (REMERON) 30 MG tablet Take 30 mg by mouth every evening.      nitroGLYCERIN (NITROSTAT) 0.4 MG SL tablet Place 1 tablet (0.4 mg total) under the tongue every 5 (five) minutes as needed for Chest pain. 90 tablet 3    ondansetron (ZOFRAN) 4  "MG tablet Take 4 mg by mouth every 8 (eight) hours as needed for Nausea.      pantoprazole (PROTONIX) 40 MG tablet       [DISCONTINUED] atorvastatin (LIPITOR) 40 MG tablet Take 40 mg by mouth every evening.       No current facility-administered medications on file prior to visit.       Medical/Social/Family History:  Past Medical History:   Diagnosis Date    Asthma     COPD (chronic obstructive pulmonary disease)     Coronary artery disease     Fibromyalgia     Hyperlipidemia     Hypertension       Social History     Tobacco Use   Smoking Status Former    Packs/day: 1.00    Years: 50.00    Pack years: 50.00    Types: Cigarettes    Quit date: 2019    Years since quittin.2   Smokeless Tobacco Never      Social History     Substance and Sexual Activity   Alcohol Use Never       Family History   Problem Relation Age of Onset    Heart attack Father     Heart disease Father       Past Surgical History:   Procedure Laterality Date    CARDIAC CATHETERIZATION      HERNIA REPAIR      HYSTERECTOMY      OOPHORECTOMY          There is no immunization history on file for this patient.       Objective:      Vitals:    23 1507 23 1512   BP: (!) 144/63 136/82   BP Location: Left arm Left arm   Patient Position: Sitting Sitting   BP Method: Medium (Automatic) Medium (Automatic)   Pulse: 67    Resp: 18    Temp: 97.9 °F (36.6 °C)    TempSrc: Oral    SpO2: 99%    Weight: 36.2 kg (79 lb 12.8 oz)    Height: 5' 6" (1.676 m)      Body mass index is 12.88 kg/m².     Physical Exam: Physical Exam  Vitals and nursing note reviewed.   Constitutional:       General: She is not in acute distress.     Appearance: Normal appearance. She is not ill-appearing, toxic-appearing or diaphoretic.   HENT:      Head: Normocephalic.      Mouth/Throat:      Mouth: Mucous membranes are moist.   Eyes:      Extraocular Movements: Extraocular movements intact.      Conjunctiva/sclera: Conjunctivae normal.      Pupils: Pupils are equal, round, " and reactive to light.   Cardiovascular:      Rate and Rhythm: Normal rate and regular rhythm.      Heart sounds: Normal heart sounds.   Pulmonary:      Effort: Pulmonary effort is normal. No respiratory distress.      Breath sounds: Normal breath sounds. No stridor. No wheezing, rhonchi or rales.   Abdominal:      General: Bowel sounds are normal.      Palpations: Abdomen is soft.   Musculoskeletal:         General: Normal range of motion.      Cervical back: Normal range of motion and neck supple.   Skin:     General: Skin is warm and dry.   Neurological:      Mental Status: She is alert and oriented to person, place, and time.      Gait: Gait normal.   Psychiatric:         Mood and Affect: Mood normal.         Behavior: Behavior normal.              Assessment:          ICD-10-CM ICD-9-CM   1. Anxiety  F41.9 300.00   2. Hypercholesterolemia  E78.00 272.0   3. Palpitation  R00.2 785.1        Plan:       Anxiety    Hypercholesterolemia  -     atorvastatin (LIPITOR) 40 MG tablet; Take 1 tablet (40 mg total) by mouth every evening.  Dispense: 90 tablet; Refill: 2    Palpitation          New & refilled meds:  Requested Prescriptions     Signed Prescriptions Disp Refills    atorvastatin (LIPITOR) 40 MG tablet 90 tablet 2     Sig: Take 1 tablet (40 mg total) by mouth every evening.       Follow up in about 3 months (around 9/21/2023), or if symptoms worsen or fail to improve, for one week for blood pressure check only, Hypertension.     Patient Instructions   Patient Education       Controlling Your Blood Pressure Through Lifestyle   The Basics   Written by the doctors and editors at [facility]   What does my lifestyle have to do with my blood pressure? -- The things you do and the foods you eat have a big effect on your blood pressure and your overall health. Following the right lifestyle can:  Lower your blood pressure or keep you from getting high blood pressure in the first place  Reduce your need for blood pressure  "medicines  Make medicines for high blood pressure work better, if you do take them  Lower the chances that you'll have a heart attack or stroke, or develop kidney disease  Which lifestyle choices will help lower my blood pressure? -- Here's what you can do:  Lose weight (if you are overweight)  Choose a diet rich in fruits, vegetables, and low-fat dairy products, and low in meats, sweets, and refined grains  Eat less salt (sodium)  Do something active for at least 30 minutes a day on most days of the week  Limit the amount of alcohol you drink  If you have high blood pressure, it's also very important to quit smoking (if you smoke). Quitting smoking might not bring your blood pressure down. But it will lower the chances that you'll have a heart attack or stroke, and it will help you feel better and live longer.  Start low and go slow -- The changes listed above might sound like a lot, but don't worry. You don't have to change everything all at once. The key to improving your lifestyle is to "start low and go slow." Choose 1 small, specific thing to change and try doing it for a while. If it works for you, keep doing it until it becomes a habit. If it doesn't, don't give up. Choose something else to change and see how that goes.  Let's say, for example, that you would like to improve your diet. If you're the type of person who eats cheeseburgers and French fries all the time, you can't switch to eating just salads from one day to the next. When people try to make changes like that, they often fail. Then they feel frustrated and tend to give up. So instead of trying to change everything about your diet in 1 day, change 1 or 2 small things about your diet and give yourself time to get used to those changes. For instance, keep the cheeseburger but give up the French fries. Or eat the same things but cut your portions in half.  As you find things that you are able to change and stick with, keep adding new changes. In " "time, you will see that you can actually change a lot. You just have to get used to the changes slowly.  Lose weight -- When people think about losing weight, they sometimes make it more complicated than it really is. To lose weight, you have to either eat less or move more. If you do both of those things, it's even better. But there is no single weight-loss diet or activity that's better than any other. When it comes to weight loss, the most effective plan is the one that you'll stick with.  Improve your diet -- There is no single diet that is right for everyone. But in general, a healthy diet can include:  Lots of fruits, vegetables, and whole grains  Some beans, peas, lentils, chickpeas, and similar foods  Some nuts, such as walnuts, almonds, and peanuts  Fat-free or low-fat milk and milk products  Some fish  To have a healthy diet, it's also important to limit or avoid sugar, sweets, meats, and refined grains. (Refined grains are found in white bread, white rice, most forms of pasta, and most packaged "snack" foods.)  Reduce salt -- Many people think that eating a low-sodium diet means avoiding the salt shaker and not adding salt when cooking. The truth is, not adding salt at the table or when you cook will only help a little. Almost all of the sodium you eat is already in the food you buy at the grocery store or at restaurants (figure 1).  The most important thing you can do to cut down on sodium is to eat less processed food. That means that you should avoid most foods that are sold in cans, boxes, jars, and bags. You should also eat in restaurants less often.  To reduce the amount of sodium you get, buy fresh or fresh-frozen fruits, vegetables, and meats. (Fresh-frozen foods have had nothing added to them before freezing.) Then you can make meals at home, from scratch, with these ingredients.  As with the other changes, don't try to cut out salt all at once. Instead, choose 1 or 2 foods that have a lot of " "sodium and try to replace them with low-sodium choices. When you get used to those low-sodium options, find another food or 2 to change. Then keep going, until all the foods you eat are sodium-free or low in sodium.  Become more active -- If you want to be more active, you don't have to go to the gym or get all sweaty. It is possible to increase your activity level while doing everyday things you enjoy. Walking, gardening, and dancing are just a few of the things that you might try. As with all the other changes, the key is not to do too much too fast. If you don't do any activity now, start by walking for just a few minutes every other day. Do that for a few weeks. If you stick with it, try doing it for longer. But if you find that you don't like walking, try a different activity.  Drink less alcohol -- If you are a woman, do not have more than 1 "standard drink" of alcohol a day. If you are a man, do not have more than 2. A "standard drink" is:  A can or bottle that has 12 ounces of beer  A glass that has 5 ounces of wine  A shot that has 1.5 ounces of whiskey  Where should I start? -- If you want to improve your lifestyle, start by making the changes that you think would be easiest for you. If you used to exercise and just got out of the habit, maybe it would be easy for you to start exercising again. Or if you actually like cooking meals from scratch, maybe the first thing you should focus on is eating home-cooked meals that are low in sodium.  Whatever you tackle first, choose specific, realistic goals, and give yourself a deadline. For example, do not decide that you are going to "exercise more." Instead, decide that you are going to walk for 10 minutes on Monday, Wednesday, and Friday, and that you are going to do this for the next 2 weeks.  When lifestyle changes are too general, people have a hard time following through.  Now go. You can do it!  All topics are updated as new evidence becomes available and " our peer review process is complete.  This topic retrieved from [employer] on: Sep 21, 2021.  Topic 08161 Version 8.0  Release: 29.4.2 - C29.263  © 2021 UpToDate, Inc. and/or its affiliates. All rights reserved.  figure 1: Sources of sodium in your diet     Graphic 27543 Version 2.0    Consumer Information Use and Disclaimer   This information is not specific medical advice and does not replace information you receive from your health care provider. This is only a brief summary of general information. It does NOT include all information about conditions, illnesses, injuries, tests, procedures, treatments, therapies, discharge instructions or life-style choices that may apply to you. You must talk with your health care provider for complete information about your health and treatment options. This information should not be used to decide whether or not to accept your health care provider's advice, instructions or recommendations. Only your health care provider has the knowledge and training to provide advice that is right for you. The use of this information is governed by the [employer] End User License Agreement, available at [email or web address]/en/[email or web address]/[email or web address].The use of [employer] content is governed by the [employer] Terms of Use. ©2021 UpToDate, Inc. All rights reserved.  Copyright   © 2021 UpToDate, Inc. and/or its affiliates. All rights reserved.           Signature: Alexsander Johnson DNP, FNP-C

## 2023-09-14 NOTE — PROGRESS NOTES
Subjective:         Patient ID: Lorraine Brambila is a 68 y.o. female.    Chief Complaint: Low-back Pain, Hip Pain, and Fibromyalgia      Pain  This is a chronic problem. The current episode started more than 1 year ago. The problem occurs daily. The problem has been waxing and waning. Associated symptoms include arthralgias and nausea. Pertinent negatives include no anorexia, change in bowel habit, chest pain, chills, coughing, diaphoresis, fever, neck pain, rash, sore throat, urinary symptoms, vertigo or vomiting.     Review of Systems   Constitutional:  Negative for activity change, appetite change, chills, diaphoresis, fever and unexpected weight change.   HENT:  Negative for drooling, ear discharge, ear pain, facial swelling, nosebleeds, sore throat, trouble swallowing, voice change and goiter.    Eyes:  Negative for photophobia, pain, discharge, redness and visual disturbance.   Respiratory:  Negative for apnea, cough, choking, chest tightness, shortness of breath, wheezing and stridor.    Cardiovascular:  Negative for chest pain, palpitations and leg swelling.   Gastrointestinal:  Positive for nausea. Negative for abdominal distention, anorexia, change in bowel habit, diarrhea, rectal pain, vomiting and fecal incontinence.   Endocrine: Negative for cold intolerance, heat intolerance, polydipsia, polyphagia and polyuria.   Genitourinary:  Negative for bladder incontinence, dysuria, flank pain, frequency and hot flashes.   Musculoskeletal:  Positive for arthralgias, back pain and leg pain. Negative for neck pain.   Integumentary:  Negative for color change, pallor and rash.   Allergic/Immunologic: Negative for immunocompromised state.   Neurological:  Negative for dizziness, vertigo, seizures, syncope, facial asymmetry, speech difficulty, light-headedness, memory loss and coordination difficulties.   Hematological:  Negative for adenopathy. Does not bruise/bleed easily.   Psychiatric/Behavioral:  Negative  "for agitation, behavioral problems, confusion, decreased concentration, dysphoric mood, hallucinations, self-injury and suicidal ideas. The patient is not nervous/anxious and is not hyperactive.            Past Medical History:   Diagnosis Date    COPD (chronic obstructive pulmonary disease)     Coronary artery disease     Fibromyalgia     Hyperlipidemia     Hypertension      Past Surgical History:   Procedure Laterality Date    CARDIAC CATHETERIZATION      HERNIA REPAIR      HYSTERECTOMY      OOPHORECTOMY       Social History     Socioeconomic History    Marital status:    Tobacco Use    Smoking status: Former     Current packs/day: 0.00     Average packs/day: 1 pack/day for 50.0 years (50.0 ttl pk-yrs)     Types: Cigarettes     Start date: 1969     Quit date: 2019     Years since quittin.9    Smokeless tobacco: Never   Substance and Sexual Activity    Alcohol use: Never    Drug use: Never    Sexual activity: Not Currently     Social Determinants of Health     Physical Activity: Sufficiently Active (2023)    Exercise Vital Sign     Days of Exercise per Week: 7 days     Minutes of Exercise per Session: 60 min     Family History   Problem Relation Age of Onset    Heart attack Father     Heart disease Father      Review of patient's allergies indicates:  No Known Allergies     Objective:  Vitals:    23 1313   BP: (!) 163/82   Pulse: 81   Resp: 18   Weight: 39.5 kg (87 lb)   Height: 5' 6" (1.676 m)   PainSc:   6         Physical Exam  Vitals and nursing note reviewed. Exam conducted with a chaperone present.   Constitutional:       General: She is awake. She is not in acute distress.     Appearance: Normal appearance. She is not ill-appearing, toxic-appearing or diaphoretic.   HENT:      Head: Normocephalic and atraumatic.      Nose: Nose normal.      Mouth/Throat:      Mouth: Mucous membranes are moist.      Pharynx: Oropharynx is clear.   Eyes:      Conjunctiva/sclera: Conjunctivae normal. "      Pupils: Pupils are equal, round, and reactive to light.   Cardiovascular:      Rate and Rhythm: Normal rate.   Pulmonary:      Effort: Pulmonary effort is normal. No respiratory distress.   Abdominal:      Palpations: Abdomen is soft.      Tenderness: There is no guarding.   Musculoskeletal:         General: Normal range of motion.      Cervical back: Normal range of motion and neck supple. No rigidity.   Skin:     General: Skin is warm and dry.      Coloration: Skin is not jaundiced or pale.   Neurological:      General: No focal deficit present.      Mental Status: She is alert and oriented to person, place, and time. Mental status is at baseline.      Cranial Nerves: No cranial nerve deficit (II-XII).   Psychiatric:         Mood and Affect: Mood normal.         Behavior: Behavior normal. Behavior is cooperative.         Thought Content: Thought content normal.           X-Ray Shoulder 2 or More Views Left  See Procedure Notes for results.     IMPRESSION: Please see Ortho procedure notes for report.      This procedure was auto-finalized by: Virtual Radiologist       No visits with results within 6 Month(s) from this visit.   Latest known visit with results is:   Lab Visit on 03/14/2023   Component Date Value Ref Range Status    Color, UA 03/14/2023 Light-Yellow  Colorless, Straw, Yellow, Light Yellow, Dark Yellow Final    Clarity, UA 03/14/2023 Clear  Clear Final    pH, UA 03/14/2023 5.5  5.0 to 8.0 pH Units Final    Leukocytes, UA 03/14/2023 Negative  Negative Final    Nitrites, UA 03/14/2023 Negative  Negative Final    Protein, UA 03/14/2023 Negative  Negative Final    Glucose, UA 03/14/2023 Normal  Normal mg/dL Final    Ketones, UA 03/14/2023 Negative  Negative mg/dL Final    Urobilinogen, UA 03/14/2023 Normal  0.2, 1.0, Normal mg/dL Final    Bilirubin, UA 03/14/2023 Negative  Negative Final    Blood, UA 03/14/2023 Negative  Negative Final    Specific Gravity, UA 03/14/2023 1.021  <=1.030 Final          Orders Placed This Encounter   Procedures    X-Ray Hips Bilateral 2 View Inc AP Pelvis     Standing Status:   Future     Number of Occurrences:   1     Standing Expiration Date:   9/18/2024     Order Specific Question:   Does the patient have a splint or a brace?     Answer:   No     Order Specific Question:   Does the patient have a cast?     Answer:   No     Order Specific Question:   May the Radiologist modify the order per protocol to meet the clinical needs of the patient?     Answer:   Yes     Order Specific Question:   Release to patient     Answer:   Immediate    X-Ray Lumbar Spine 2 Or 3 Views     Standing Status:   Future     Number of Occurrences:   1     Standing Expiration Date:   12/18/2023     Order Specific Question:   May the Radiologist modify the order per protocol to meet the clinical needs of the patient?     Answer:   Yes     Order Specific Question:   Does the patient have a neck collar or brace on?     Answer:   No    Drug Screen Definitive 14, Urine     Standing Status:   Future     Number of Occurrences:   1     Standing Expiration Date:   11/16/2024     Order Specific Question:   Specimen Source     Answer:   Urine    Vitamin D     Standing Status:   Future     Number of Occurrences:   1     Standing Expiration Date:   12/18/2023    POCT Urine Drug Screen Presump     Interpretive Information:     Negative:  No drug detected at the cut off level.   Positive:  This result represents presumptive positive for the   tested drug, other substances may yield a positive response other   than the analyte of interest. This result should be utilized for   diagnostic purpose only. Confirmation testing will be performed upon physician request only.          Requested Prescriptions     Signed Prescriptions Disp Refills    ergocalciferol (VITAMIN D2) 50,000 unit Cap 8 capsule 0     Sig: Take 1 capsule (50,000 Units total) by mouth every 7 days. for 8 doses       Assessment:     1. Neuropathy    2.  Other chronic pain    3. Encounter for long-term (current) use of other medications    4. Lumbosacral spondylosis without myelopathy    5. Myalgia    6. Vitamin D deficiency    7. Dorsalgia, unspecified         A's of Opioid Risk Assessment  Activity:Patient can perform ADL.   Analgesia:Patients pain is partially controlled by current medication. Patient has tried OTC medications such as Tylenol and Ibuprofen with out relief.   Adverse Effects: Patient denies constipation or sedation.  Aberrant Behavior:  reviewed with no aberrant drug seeking/taking behavior.  Overdose reversal drug naloxone discussed    Drug screen reviewed      Plan:    September 19, 2023 vitamin-D level 19    Vitamin-D replacement 50,000 units 1 capsule p.o. q.week times 8 week        New patient     Alexsander Johnson NP     [place]    COPD, asthma, coronary artery disease hyperlipidemia hypertension    BUN creatinine elevated      History fibromyalgia, chronic pain, neuropathy lower extremity    Patient has discontinue benzodiazepine     Complaint of multiple areas of joint back pain multiple years ongoing for more than 20 years getting worse with time    Complaint of pain burning numbness and tingling bilateral feet worse at night neuropathy type pain    Requesting to continue her tramadol    Labs reviewed March 14, 2023 hepatitis-C, negative    Bone density study March 14, 2023 osteoporosis      No inflammatory arthropathies/tick bite/vitamin-D panel     No x-rays MRIs for review    Will order x-ray lumbar spine pelvis and hips     Patient declines EMG nerve conduction study lower extremities    Will order labs vitamin-D deficiency    Patient declines small fiber biopsy    Presumptive drug screen negative, this is expected result, patient takes tramadol, cup does not test for tramadol     Will order definitive drug screen for clarification    She states she prefers to take her medicine which does help her neuropathy symptoms lower  extremity    She verbalized understanding can not take benzodiazepine while taking narcotics    Follow-up 1 month discuss options with Dr. Osorio    Bring original prescription medication bottles/container/box with labels to each visit

## 2023-09-18 ENCOUNTER — HOSPITAL ENCOUNTER (OUTPATIENT)
Dept: RADIOLOGY | Facility: HOSPITAL | Age: 68
Discharge: HOME | End: 2023-09-18
Attending: PHYSICIAN ASSISTANT
Payer: MEDICARE

## 2023-09-18 ENCOUNTER — OFFICE VISIT (OUTPATIENT)
Dept: PAIN MEDICINE | Facility: CLINIC | Age: 68
End: 2023-09-18
Payer: MEDICARE

## 2023-09-18 VITALS
WEIGHT: 87 LBS | SYSTOLIC BLOOD PRESSURE: 163 MMHG | BODY MASS INDEX: 13.98 KG/M2 | HEART RATE: 81 BPM | DIASTOLIC BLOOD PRESSURE: 82 MMHG | RESPIRATION RATE: 18 BRPM | HEIGHT: 66 IN

## 2023-09-18 DIAGNOSIS — M54.9 DORSALGIA, UNSPECIFIED: ICD-10-CM

## 2023-09-18 DIAGNOSIS — G89.29 OTHER CHRONIC PAIN: ICD-10-CM

## 2023-09-18 DIAGNOSIS — M79.10 MYALGIA: Chronic | ICD-10-CM

## 2023-09-18 DIAGNOSIS — M79.10 MYALGIA: ICD-10-CM

## 2023-09-18 DIAGNOSIS — Z79.899 ENCOUNTER FOR LONG-TERM (CURRENT) USE OF OTHER MEDICATIONS: ICD-10-CM

## 2023-09-18 DIAGNOSIS — E55.9 VITAMIN D DEFICIENCY: Chronic | ICD-10-CM

## 2023-09-18 DIAGNOSIS — M47.817 LUMBOSACRAL SPONDYLOSIS WITHOUT MYELOPATHY: Chronic | ICD-10-CM

## 2023-09-18 DIAGNOSIS — G62.9 NEUROPATHY: Primary | Chronic | ICD-10-CM

## 2023-09-18 DIAGNOSIS — M47.817 LUMBOSACRAL SPONDYLOSIS WITHOUT MYELOPATHY: ICD-10-CM

## 2023-09-18 LAB
CTP QC/QA: YES
POC (AMP) AMPHETAMINE: NEGATIVE
POC (BAR) BARBITURATES: NEGATIVE
POC (BUP) BUPRENORPHINE: NEGATIVE
POC (BZO) BENZODIAZEPINES: NEGATIVE
POC (COC) COCAINE: NEGATIVE
POC (MDMA) METHYLENEDIOXYMETHAMPHETAMINE 3,4: NEGATIVE
POC (MET) METHAMPHETAMINE: NEGATIVE
POC (MOP) OPIATES: NEGATIVE
POC (MTD) METHADONE: NEGATIVE
POC (OXY) OXYCODONE: NEGATIVE
POC (PCP) PHENCYCLIDINE: NEGATIVE
POC (TCA) TRICYCLIC ANTIDEPRESSANTS: NEGATIVE
POC TEMPERATURE (URINE): 90
POC THC: NEGATIVE

## 2023-09-18 PROCEDURE — 99204 OFFICE O/P NEW MOD 45 MIN: CPT | Mod: S$PBB,,, | Performed by: PHYSICIAN ASSISTANT

## 2023-09-18 PROCEDURE — 99999PBSHW POCT URINE DRUG SCREEN PRESUMP: Mod: PBBFAC,,,

## 2023-09-18 PROCEDURE — 73521 X-RAY EXAM HIPS BI 2 VIEWS: CPT | Mod: 26,,, | Performed by: RADIOLOGY

## 2023-09-18 PROCEDURE — 73521 XR HIPS BILATERAL 2 VIEW INCL AP PELVIS: ICD-10-PCS | Mod: 26,,, | Performed by: RADIOLOGY

## 2023-09-18 PROCEDURE — 72100 XR LUMBAR SPINE 2 OR 3 VIEWS: ICD-10-PCS | Mod: 26,,, | Performed by: RADIOLOGY

## 2023-09-18 PROCEDURE — 99215 OFFICE O/P EST HI 40 MIN: CPT | Mod: PBBFAC | Performed by: PHYSICIAN ASSISTANT

## 2023-09-18 PROCEDURE — 72100 X-RAY EXAM L-S SPINE 2/3 VWS: CPT | Mod: TC

## 2023-09-18 PROCEDURE — 80305 DRUG TEST PRSMV DIR OPT OBS: CPT | Mod: PBBFAC | Performed by: PHYSICIAN ASSISTANT

## 2023-09-18 PROCEDURE — G0481 DRUG TEST DEF 8-14 CLASSES: HCPCS | Mod: ,,, | Performed by: CLINICAL MEDICAL LABORATORY

## 2023-09-18 PROCEDURE — 73521 X-RAY EXAM HIPS BI 2 VIEWS: CPT | Mod: TC

## 2023-09-18 PROCEDURE — 99999PBSHW POCT URINE DRUG SCREEN PRESUMP: ICD-10-PCS | Mod: PBBFAC,,,

## 2023-09-18 PROCEDURE — 72100 X-RAY EXAM L-S SPINE 2/3 VWS: CPT | Mod: 26,,, | Performed by: RADIOLOGY

## 2023-09-18 PROCEDURE — 99204 PR OFFICE/OUTPT VISIT, NEW, LEVL IV, 45-59 MIN: ICD-10-PCS | Mod: S$PBB,,, | Performed by: PHYSICIAN ASSISTANT

## 2023-09-18 PROCEDURE — G0481 PR DRUG TEST DEF 8-14 CLASSES: ICD-10-PCS | Mod: ,,, | Performed by: CLINICAL MEDICAL LABORATORY

## 2023-09-18 RX ORDER — TRAMADOL HYDROCHLORIDE 50 MG/1
50 TABLET ORAL EVERY 6 HOURS PRN
Qty: 120 TABLET | Refills: 0 | Status: SHIPPED | OUTPATIENT
Start: 2023-09-18 | End: 2023-11-29 | Stop reason: SDUPTHER

## 2023-09-18 RX ORDER — GABAPENTIN 100 MG/1
100 CAPSULE ORAL 3 TIMES DAILY
Qty: 90 CAPSULE | Refills: 0 | Status: SHIPPED | OUTPATIENT
Start: 2023-09-18 | End: 2023-10-18 | Stop reason: SDUPTHER

## 2023-09-19 RX ORDER — ERGOCALCIFEROL 1.25 MG/1
50000 CAPSULE ORAL
Qty: 8 CAPSULE | Refills: 0 | Status: SHIPPED | OUTPATIENT
Start: 2023-09-19 | End: 2023-11-08

## 2023-09-21 ENCOUNTER — OFFICE VISIT (OUTPATIENT)
Dept: PRIMARY CARE CLINIC | Facility: CLINIC | Age: 68
End: 2023-09-21
Payer: MEDICARE

## 2023-09-21 VITALS
HEIGHT: 66 IN | SYSTOLIC BLOOD PRESSURE: 135 MMHG | RESPIRATION RATE: 20 BRPM | OXYGEN SATURATION: 99 % | BODY MASS INDEX: 13.79 KG/M2 | TEMPERATURE: 98 F | WEIGHT: 85.81 LBS | DIASTOLIC BLOOD PRESSURE: 81 MMHG | HEART RATE: 71 BPM

## 2023-09-21 DIAGNOSIS — G47.09 OTHER INSOMNIA: ICD-10-CM

## 2023-09-21 DIAGNOSIS — E78.49 OTHER HYPERLIPIDEMIA: Primary | ICD-10-CM

## 2023-09-21 LAB
ALBUMIN SERPL BCP-MCNC: 3.7 G/DL (ref 3.5–5)
ALBUMIN/GLOB SERPL: 0.9 {RATIO}
ALP SERPL-CCNC: 81 U/L (ref 55–142)
ALT SERPL W P-5'-P-CCNC: 25 U/L (ref 13–56)
ANION GAP SERPL CALCULATED.3IONS-SCNC: 8 MMOL/L (ref 7–16)
AST SERPL W P-5'-P-CCNC: 20 U/L (ref 15–37)
BASOPHILS # BLD AUTO: 0.05 K/UL (ref 0–0.2)
BASOPHILS NFR BLD AUTO: 0.7 % (ref 0–1)
BILIRUB SERPL-MCNC: 0.2 MG/DL (ref ?–1.2)
BUN SERPL-MCNC: 16 MG/DL (ref 7–18)
BUN/CREAT SERPL: 17 (ref 6–20)
CALCIUM SERPL-MCNC: 10.1 MG/DL (ref 8.5–10.1)
CHLORIDE SERPL-SCNC: 102 MMOL/L (ref 98–107)
CHOLEST SERPL-MCNC: 179 MG/DL (ref 0–200)
CHOLEST/HDLC SERPL: 2.1 {RATIO}
CO2 SERPL-SCNC: 31 MMOL/L (ref 21–32)
CREAT SERPL-MCNC: 0.93 MG/DL (ref 0.55–1.02)
DIFFERENTIAL METHOD BLD: ABNORMAL
EGFR (NO RACE VARIABLE) (RUSH/TITUS): 67 ML/MIN/1.73M2
EOSINOPHIL # BLD AUTO: 0.2 K/UL (ref 0–0.5)
EOSINOPHIL NFR BLD AUTO: 2.8 % (ref 1–4)
ERYTHROCYTE [DISTWIDTH] IN BLOOD BY AUTOMATED COUNT: 12.1 % (ref 11.5–14.5)
GLOBULIN SER-MCNC: 4 G/DL (ref 2–4)
GLUCOSE SERPL-MCNC: 77 MG/DL (ref 74–106)
HCT VFR BLD AUTO: 40.6 % (ref 38–47)
HDLC SERPL-MCNC: 87 MG/DL (ref 40–60)
HGB BLD-MCNC: 13 G/DL (ref 12–16)
IMM GRANULOCYTES # BLD AUTO: 0.02 K/UL (ref 0–0.04)
IMM GRANULOCYTES NFR BLD: 0.3 % (ref 0–0.4)
LDLC SERPL CALC-MCNC: 76 MG/DL
LYMPHOCYTES # BLD AUTO: 2.44 K/UL (ref 1–4.8)
LYMPHOCYTES NFR BLD AUTO: 34.3 % (ref 27–41)
MCH RBC QN AUTO: 31.8 PG (ref 27–31)
MCHC RBC AUTO-ENTMCNC: 32 G/DL (ref 32–36)
MCV RBC AUTO: 99.3 FL (ref 80–96)
MONOCYTES # BLD AUTO: 0.67 K/UL (ref 0–0.8)
MONOCYTES NFR BLD AUTO: 9.4 % (ref 2–6)
MPC BLD CALC-MCNC: 9.1 FL (ref 9.4–12.4)
NEUTROPHILS # BLD AUTO: 3.74 K/UL (ref 1.8–7.7)
NEUTROPHILS NFR BLD AUTO: 52.5 % (ref 53–65)
NONHDLC SERPL-MCNC: 92 MG/DL
NRBC # BLD AUTO: 0 X10E3/UL
NRBC, AUTO (.00): 0 %
PLATELET # BLD AUTO: 236 K/UL (ref 150–400)
POTASSIUM SERPL-SCNC: 4 MMOL/L (ref 3.5–5.1)
PROT SERPL-MCNC: 7.7 G/DL (ref 6.4–8.2)
RBC # BLD AUTO: 4.09 M/UL (ref 4.2–5.4)
SODIUM SERPL-SCNC: 137 MMOL/L (ref 136–145)
TRIGL SERPL-MCNC: 80 MG/DL (ref 35–150)
VLDLC SERPL-MCNC: 16 MG/DL
WBC # BLD AUTO: 7.12 K/UL (ref 4.5–11)

## 2023-09-21 PROCEDURE — 85025 CBC WITH DIFFERENTIAL: ICD-10-PCS | Mod: ,,, | Performed by: CLINICAL MEDICAL LABORATORY

## 2023-09-21 PROCEDURE — 80061 LIPID PANEL: ICD-10-PCS | Mod: ,,, | Performed by: CLINICAL MEDICAL LABORATORY

## 2023-09-21 PROCEDURE — 80053 COMPREHENSIVE METABOLIC PANEL: ICD-10-PCS | Mod: ,,, | Performed by: CLINICAL MEDICAL LABORATORY

## 2023-09-21 PROCEDURE — 80053 COMPREHEN METABOLIC PANEL: CPT | Mod: ,,, | Performed by: CLINICAL MEDICAL LABORATORY

## 2023-09-21 PROCEDURE — 80061 LIPID PANEL: CPT | Mod: ,,, | Performed by: CLINICAL MEDICAL LABORATORY

## 2023-09-21 PROCEDURE — 99214 PR OFFICE/OUTPT VISIT, EST, LEVL IV, 30-39 MIN: ICD-10-PCS | Mod: ,,, | Performed by: NURSE PRACTITIONER

## 2023-09-21 PROCEDURE — 99214 OFFICE O/P EST MOD 30 MIN: CPT | Mod: ,,, | Performed by: NURSE PRACTITIONER

## 2023-09-21 PROCEDURE — 85025 COMPLETE CBC W/AUTO DIFF WBC: CPT | Mod: ,,, | Performed by: CLINICAL MEDICAL LABORATORY

## 2023-09-21 RX ORDER — MIRTAZAPINE 45 MG/1
45 TABLET, FILM COATED ORAL NIGHTLY
Qty: 30 TABLET | Refills: 11 | Status: SHIPPED | OUTPATIENT
Start: 2023-09-21 | End: 2024-02-15 | Stop reason: SDUPTHER

## 2023-09-21 NOTE — PROGRESS NOTES
[facility]  Primary Care       PATIENT NAME: Lorraine Brambila   : 1955    AGE: 68 y.o. DATE: 2023    MRN: [id number]        Reason for Visit / Chief Complaint:  Hypertension (Has been up. ) and Hyperlipidemia     Subjective:     HPI: Patient here for routine check up; states she is not longer taking xanax; states she has increased her Remeron to 45 mg po at bedtime and states it has been working well for insomnia.     Patient states she takes the metoprolol for palpitations.     States she sees Dr. Ford for chronic pain management; states she was started on Gabapentin.     Hypertension  Pertinent negatives include no chest pain, headaches or shortness of breath.   Hyperlipidemia  Pertinent negatives include no chest pain or shortness of breath.          Review of Systems: Review of Systems   Constitutional:  Negative for chills, fatigue and fever.   Respiratory:  Negative for cough, chest tightness and shortness of breath.    Cardiovascular:  Negative for chest pain.   Gastrointestinal:  Negative for abdominal pain, constipation, diarrhea, nausea and vomiting.   Genitourinary:  Negative for dysuria.   Musculoskeletal:  Negative for gait problem.   Skin:  Negative for rash.   Neurological:  Negative for headaches.          Review of patient's allergies indicates:  No Known Allergies     Med List:  Current Outpatient Medications on File Prior to Visit   Medication Sig Dispense Refill    atorvastatin (LIPITOR) 40 MG tablet Take 1 tablet (40 mg total) by mouth every evening. 90 tablet 2    clopidogreL (PLAVIX) 75 mg tablet Take 1 tablet (75 mg total) by mouth once daily. 90 tablet 3    diphenhydrAMINE-acetaminophen (TYLENOL PM)  mg Tab Take 1 tablet by mouth nightly as needed.      ergocalciferol (VITAMIN D2) 50,000 unit Cap Take 1 capsule (50,000 Units total) by mouth every 7 days. for 8 doses 8 capsule 0    gabapentin (NEURONTIN) 100 MG capsule Take 1 capsule (100 mg  "total) by mouth 3 (three) times daily. 90 capsule 0    metoprolol succinate (TOPROL-XL) 25 MG 24 hr tablet Take 1 tablet (25 mg total) by mouth once daily. 90 tablet 3    nitroGLYCERIN (NITROSTAT) 0.4 MG SL tablet Place 1 tablet (0.4 mg total) under the tongue every 5 (five) minutes as needed for Chest pain. 90 tablet 3    ondansetron (ZOFRAN) 4 MG tablet Take 4 mg by mouth every 8 (eight) hours as needed for Nausea.      pantoprazole (PROTONIX) 40 MG tablet       traMADoL (ULTRAM) 50 mg tablet Take 1 tablet (50 mg total) by mouth every 6 (six) hours as needed for Pain. 120 tablet 0    [DISCONTINUED] mirtazapine (REMERON) 30 MG tablet Take 30 mg by mouth every evening.       No current facility-administered medications on file prior to visit.       Medical/Social/Family History:  Past Medical History:   Diagnosis Date    COPD (chronic obstructive pulmonary disease)     Coronary artery disease     Fibromyalgia     Hyperlipidemia     Hypertension       Social History     Tobacco Use   Smoking Status Former    Current packs/day: 0.00    Average packs/day: 1 pack/day for 50.0 years (50.0 ttl pk-yrs)    Types: Cigarettes    Start date: 1969    Quit date: 2019    Years since quittin.4   Smokeless Tobacco Never      Social History     Substance and Sexual Activity   Alcohol Use Never       Family History   Problem Relation Age of Onset    Heart attack Father     Heart disease Father       Past Surgical History:   Procedure Laterality Date    CARDIAC CATHETERIZATION      HERNIA REPAIR      HYSTERECTOMY      OOPHORECTOMY          There is no immunization history on file for this patient.       Objective:      Vitals:    23 1510   BP: 135/81   BP Location: Left arm   Patient Position: Sitting   BP Method: Medium (Automatic)   Pulse: 71   Resp: 20   Temp: 98 °F (36.7 °C)   TempSrc: Oral   SpO2: 99%   Weight: 38.9 kg (85 lb 12.8 oz)   Height: 5' 6" (1.676 m)     Body mass index is 13.85 " kg/m².     Physical Exam: Physical Exam  Vitals and nursing note reviewed.   Constitutional:       Appearance: Normal appearance.   HENT:      Head: Normocephalic.      Mouth/Throat:      Mouth: Mucous membranes are moist.   Eyes:      Pupils: Pupils are equal, round, and reactive to light.   Cardiovascular:      Rate and Rhythm: Normal rate and regular rhythm.      Heart sounds: Normal heart sounds.   Pulmonary:      Effort: Pulmonary effort is normal.      Breath sounds: Normal breath sounds.   Abdominal:      General: Bowel sounds are normal.      Palpations: Abdomen is soft.   Musculoskeletal:         General: Normal range of motion.      Cervical back: Normal range of motion.   Skin:     General: Skin is warm and dry.   Neurological:      Mental Status: She is alert and oriented to person, place, and time.      Gait: Gait normal.   Psychiatric:         Mood and Affect: Mood normal.         Behavior: Behavior normal.              Assessment:          ICD-10-CM ICD-9-CM   1. Other hyperlipidemia  E78.49 272.4   2. Other insomnia  G47.09 780.52        Plan:       Other hyperlipidemia  -     CBC Auto Differential; Future; Expected date: 09/21/2023  -     Comprehensive Metabolic Panel; Future; Expected date: 09/21/2023  -     Lipid Panel; Future; Expected date: 09/21/2023    Other insomnia  -     mirtazapine (REMERON) 45 MG tablet; Take 1 tablet (45 mg total) by mouth every evening.  Dispense: 30 tablet; Refill: 11          New & refilled meds:  Requested Prescriptions     Signed Prescriptions Disp Refills    mirtazapine (REMERON) 45 MG tablet 30 tablet 11     Sig: Take 1 tablet (45 mg total) by mouth every evening.       Follow up if symptoms worsen or fail to improve.     There are no Patient Instructions on file for this visit.         Signature: Alexsander Johnson DNP, FNP-C

## 2023-09-22 ENCOUNTER — TELEPHONE (OUTPATIENT)
Dept: PRIMARY CARE CLINIC | Facility: CLINIC | Age: 68
End: 2023-09-22
Payer: MEDICARE

## 2023-09-22 NOTE — TELEPHONE ENCOUNTER
----- Message from Alexsander Johnson DNP, GIUSEPPE-C sent at 9/22/2023  8:16 AM CDT -----  Please notify of results.

## 2023-09-25 LAB
6-ACETYLMORPHINE, URINE (RUSH): NEGATIVE 10 NG/ML
7-AMINOCLONAZEPAM, URINE (RUSH): NEGATIVE 25 NG/ML
A-HYDROXYALPRAZOLAM, URINE (RUSH): NEGATIVE 25 NG/ML
ACETYL FENTANYL, URINE (RUSH): NEGATIVE 2.5 NG/ML
ACETYL NORFENTANYL OXALATE, URINE (RUSH): NEGATIVE 5 NG/ML
AMPHET UR QL SCN: NEGATIVE
BENZOYLECGONINE, URINE (RUSH): NEGATIVE 100 NG/ML
BUPRENORPHINE UR QL SCN: NEGATIVE 25 NG/ML
CODEINE, URINE (RUSH): NEGATIVE 25 NG/ML
CREAT UR-MCNC: 51 MG/DL (ref 28–219)
EDDP, URINE (RUSH): NEGATIVE 25 NG/ML
FENTANYL, URINE (RUSH): NEGATIVE 2.5 NG/ML
HYDROCODONE, URINE (RUSH): NEGATIVE 25 NG/ML
HYDROMORPHONE, URINE (RUSH): NEGATIVE 25 NG/ML
LORAZEPAM, URINE (RUSH): NEGATIVE 25 NG/ML
METHADONE UR QL SCN: NEGATIVE 25 NG/ML
METHAMPHET UR QL SCN: NEGATIVE
MORPHINE, URINE (RUSH): NEGATIVE 25 NG/ML
NORBUPRENORPHINE, URINE (RUSH): NEGATIVE 25 NG/ML
NORDIAZEPAM, URINE (RUSH): NEGATIVE 25 NG/ML
NORFENTANYL OXALATE, URINE (RUSH): NEGATIVE 5 NG/ML
NORHYDROCODONE, URINE (RUSH): NEGATIVE 50 NG/ML
NOROXYCODONE HCL, URINE (RUSH): NEGATIVE 50 NG/ML
OXAZEPAM, URINE (RUSH): NEGATIVE 25 NG/ML
OXYCODONE UR QL SCN: NEGATIVE 25 NG/ML
OXYMORPHONE, URINE (RUSH): NEGATIVE 25 NG/ML
PH UR STRIP: 7.5 PH UNITS
SP GR UR STRIP: 1.01
TAPENTADOL, URINE (RUSH): NEGATIVE 25 NG/ML
TEMAZEPAM, URINE (RUSH): NEGATIVE 25 NG/ML
THC-COOH, URINE (RUSH): NEGATIVE 25 NG/ML
TRAMADOL, URINE (RUSH): >1000 100 NG/ML

## 2023-10-18 ENCOUNTER — OFFICE VISIT (OUTPATIENT)
Dept: PAIN MEDICINE | Facility: CLINIC | Age: 68
End: 2023-10-18
Payer: MEDICARE

## 2023-10-18 VITALS
HEART RATE: 71 BPM | WEIGHT: 88 LBS | BODY MASS INDEX: 14.14 KG/M2 | SYSTOLIC BLOOD PRESSURE: 120 MMHG | RESPIRATION RATE: 18 BRPM | DIASTOLIC BLOOD PRESSURE: 56 MMHG | HEIGHT: 66 IN

## 2023-10-18 DIAGNOSIS — G62.9 NEUROPATHY: Chronic | ICD-10-CM

## 2023-10-18 DIAGNOSIS — M25.552 BILATERAL HIP PAIN: Primary | ICD-10-CM

## 2023-10-18 DIAGNOSIS — M25.551 BILATERAL HIP PAIN: Primary | ICD-10-CM

## 2023-10-18 PROCEDURE — 99203 PR OFFICE/OUTPT VISIT, NEW, LEVL III, 30-44 MIN: ICD-10-PCS | Mod: S$PBB,,, | Performed by: PAIN MEDICINE

## 2023-10-18 PROCEDURE — 99203 OFFICE O/P NEW LOW 30 MIN: CPT | Mod: S$PBB,,, | Performed by: PAIN MEDICINE

## 2023-10-18 PROCEDURE — 99215 OFFICE O/P EST HI 40 MIN: CPT | Mod: PBBFAC | Performed by: PAIN MEDICINE

## 2023-10-18 RX ORDER — TRAMADOL HYDROCHLORIDE 50 MG/1
50 TABLET ORAL EVERY 6 HOURS PRN
Qty: 120 TABLET | Refills: 0 | Status: SHIPPED | OUTPATIENT
Start: 2023-10-18 | End: 2023-11-17

## 2023-10-18 RX ORDER — GABAPENTIN 100 MG/1
100 CAPSULE ORAL 3 TIMES DAILY
Qty: 90 CAPSULE | Refills: 0 | Status: SHIPPED | OUTPATIENT
Start: 2023-10-18 | End: 2024-02-29 | Stop reason: SDUPTHER

## 2023-10-18 NOTE — PATIENT INSTRUCTIONS
YOU WILL BE SCHEDULED FOR BILATERAL HIP INJECTION UNDER FLUORO WHEN OUR OFFICE RECEIVES APPROVAL TO HOLD PLAVIX FROM DR. SHEN OFFICE.

## 2023-10-18 NOTE — PROGRESS NOTES
"She Disclaimer: This note has been generated using voice-recognition software. There may be typographical errors that have been missed during proof-reading        Patient ID: Lorraine Brambila is a 68 y.o. female.      Chief Complaint: Hip Pain (Bilateral- but says all over too)      68-year-old female returns for re-evaluation of chronic diffuse body pain,  bilateral hips and lower back.  She was diagnosed with fibromyalgia and has been treated with medication management for greater than 20 years.  She has not received a rheumatology, orthopedic evaluation of nerve block injections.  She was evaluated by [facility] 1 month ago for chronic pain and continuation of medication management.  She has a known history of osteoporosis.  X-rays were obtained and revealed mild-to-moderate degenerative changes of the hips and the lumbar spine.  Results were discussed with patient.  She reports a "popping sensation" of the hips that is exacerbated with walking and standing.  Her last physical therapy was 15 years ago.  She remains on Plavix for coronary artery disease and is unable to receive NSAIDs.  Bilateral hip injections for hip pain and osteoarthritis were discussed and recommended.          Pain Assessment  Pain Assessment: 0-10  Pain Score:   6  Pain Location: Hip  Pain Orientation: Right, Left  Pain Descriptors: Burning, Aching  Pain Frequency: Intermittent  Pain Onset: Awakened from sleep  Clinical Progression: Not changed  Aggravating Factors: Walking  Pain Intervention(s): Medication (See eMAR), Home medication, Heat applied      A's of Opioid Risk Assessment  Activity:Patient has difficulty performing ADL.   Analgesia:Patients pain is partially controlled by current medication.   Adverse Effects: Patient denies constipation or sedation.  Aberrant Behavior:  reviewed with no aberrant drug seeking/taking behavior.      Patient denies any suicidal or homicidal ideations    Physical Therapy/Home Exercise: no  "     X-Ray Hips Bilateral 2 View Inc AP Pelvis  Narrative: EXAMINATION:  XR HIPS BILATERAL 2 VIEW INCL AP PELVIS    CLINICAL HISTORY:  Spondylosis without myelopathy or radiculopathy, lumbosacral region    TECHNIQUE:  AP view of the pelvis and frogleg lateral views of both hips were performed.    COMPARISON:  None.    FINDINGS:  There is no fracture or dislocation.  Mild-to-moderate degenerative changes are seen of both hips with joint space narrowing and sclerosis.  Impression: Degenerative changes    Electronically signed by: Abdirahman Middleton  Date:    09/18/2023  Time:    14:33  X-Ray Lumbar Spine 2 Or 3 Views  Narrative: EXAMINATION:  XR LUMBAR SPINE 2 OR 3 VIEWS    CLINICAL HISTORY:  Low back pain, symptoms persist with > 6wks conservative treatment;  Dorsalgia, unspecified    TECHNIQUE:  AP and lateral lumbar spine    COMPARISON:  None    FINDINGS:  Vertebral body heights and alignment are maintained.  Degenerative changes are seen throughout the endplates most notably at L4-5 where there is endplate sclerosis, osteophytes, and disc height loss.  Multilevel facet degeneration present as well with osteophytes.  Impression: Multilevel degenerative changes.    Electronically signed by: Abdirahman Middleton  Date:    09/18/2023  Time:    14:32      Review of Systems   Constitutional: Negative.    HENT: Negative.     Eyes: Negative.    Respiratory: Negative.     Cardiovascular: Negative.    Gastrointestinal: Negative.    Endocrine: Negative.    Genitourinary: Negative.    Musculoskeletal:  Positive for arthralgias (Bilateral hips), back pain and gait problem.   Integumentary:  Negative.   Hematological: Negative.    Psychiatric/Behavioral: Negative.               Past Medical History:   Diagnosis Date    COPD (chronic obstructive pulmonary disease)     Coronary artery disease     Fibromyalgia     Hyperlipidemia     Hypertension      Past Surgical History:   Procedure Laterality Date    CARDIAC CATHETERIZATION      HERNIA REPAIR       HYSTERECTOMY      OOPHORECTOMY       Social History     Socioeconomic History    Marital status:    Tobacco Use    Smoking status: Former     Current packs/day: 0.00     Average packs/day: 1 pack/day for 50.0 years (50.0 ttl pk-yrs)     Types: Cigarettes     Start date: 1969     Quit date: 2019     Years since quittin.5    Smokeless tobacco: Never   Substance and Sexual Activity    Alcohol use: Never    Drug use: Never     Social Determinants of Health     Physical Activity: Sufficiently Active (2023)    Exercise Vital Sign     Days of Exercise per Week: 7 days     Minutes of Exercise per Session: 60 min     Family History   Problem Relation Age of Onset    Heart attack Father     Heart disease Father      Review of patient's allergies indicates:  No Known Allergies  has a current medication list which includes the following prescription(s): atorvastatin, clopidogrel, ergocalciferol, metoprolol succinate, mirtazapine, pantoprazole, diphenhydramine-acetaminophen, gabapentin, nitroglycerin, ondansetron, tramadol, and tramadol.      Objective:  Vitals:    10/18/23 1448   BP: (!) 120/56   Pulse: 71   Resp: 18        Physical Exam  Vitals and nursing note reviewed.   Constitutional:       General: She is not in acute distress.     Appearance: Normal appearance. She is not ill-appearing, toxic-appearing or diaphoretic.   HENT:      Head: Normocephalic and atraumatic.      Nose: Nose normal.      Mouth/Throat:      Mouth: Mucous membranes are moist.   Eyes:      Extraocular Movements: Extraocular movements intact.      Pupils: Pupils are equal, round, and reactive to light.   Cardiovascular:      Rate and Rhythm: Normal rate and regular rhythm.      Heart sounds: Normal heart sounds.   Pulmonary:      Effort: Pulmonary effort is normal. No respiratory distress.      Breath sounds: Normal breath sounds. No stridor. No wheezing or rhonchi.   Abdominal:      General: Bowel sounds are normal.       Palpations: Abdomen is soft.   Musculoskeletal:         General: No swelling or deformity.      Cervical back: Normal and normal range of motion. No spasms or tenderness. No pain with movement. Normal range of motion.      Thoracic back: Normal.      Lumbar back: No spasms, tenderness or bony tenderness. Normal range of motion. Negative right straight leg raise test and negative left straight leg raise test. No scoliosis.      Right hip: Tenderness and bony tenderness present. Decreased range of motion.      Left hip: Tenderness and bony tenderness present. Decreased range of motion.      Right lower leg: No edema.      Left lower leg: No edema.   Skin:     General: Skin is warm.   Neurological:      General: No focal deficit present.      Mental Status: She is alert and oriented to person, place, and time. Mental status is at baseline.      Cranial Nerves: No cranial nerve deficit.      Sensory: Sensation is intact. No sensory deficit.      Motor: No weakness.      Coordination: Coordination normal.      Gait: Gait normal.      Deep Tendon Reflexes: Reflexes are normal and symmetric.   Psychiatric:         Mood and Affect: Mood normal.         Behavior: Behavior normal.           Assessment:      1. Bilateral hip pain    2. Neuropathy          Plan:  1. reviewed  2.Addiction, Dependency, Tolerance, Opioid abuse-misuse, Death, Diversion Discussed. Overdose reversal drug Naloxone discussed.  3.Refill/Continue medications for pain control and function       Requested Prescriptions     Signed Prescriptions Disp Refills    traMADoL (ULTRAM) 50 mg tablet 120 tablet 0     Sig: Take 1 tablet (50 mg total) by mouth every 6 (six) hours as needed for Pain.    gabapentin (NEURONTIN) 100 MG capsule 90 capsule 0     Sig: Take 1 capsule (100 mg total) by mouth 3 (three) times daily.     4.Schedule hip injections under fluoroscopy  Orders Placed This Encounter   Procedures    Case Request Operating Room: Bilateral hip  injections     Order Specific Question:   Medical Necessity:     Answer:   Medically Non-Urgent [100]     Order Specific Question:   CPT Code:     Answer:   [place] DRAIN/INJECT LARGE JOINT/BURSA [[id number]]     Order Specific Question:   Post-Procedure Disposition:     Answer:   PACU [1]     Order Specific Question:   Estimated Length of Stay:     Answer:   0 midnight     Order Specific Question:   Implant Required:     Answer:   No [1001]     Order Specific Question:   Is an on-site pathologist required for this procedure?     Answer:   N/A      5.Monitored Anesthesia Care medical necessity authorization request:    Monitor anesthesia request is medically indicated for the scheduled nerve block procedure due to:  - needle phobia and anxiety, placing  the patient at risk during the provided service.  -patient has an ASA class greater than 3 and requires constant presence of an anesthesiologist during the procedure:  -patient has severe problems with muscles and muscle spasticity that makes it hard to lie still  -patient suffers from chronic pain and is unable to function due to  diminished ADLs    6.The planned medically necessary  surgical procedure is performed in a hospital outpatient department and not in an ambulatory surgical center due to:     -there is no geographically assessable ambulatory surgery center that has the  necessary equipment and fluoroscopy needed for the procedure     -there is no geographically assessable ambulatory surgical center available at which the physician has privileges     -an ASC's  specific  guideline regarding the individuals weight or health conditions that prevent the use of an ASC       report:  Reviewed and consistent with medication use as prescribed.      The total time spent for evaluation and management on 10/22/2023 including reviewing separately obtained history, performing a medically appropriate exam and evaluation, documenting clinical information in the health record,  independently interpreting results and communicating them to the patient/family/caregiver, and ordering medications/tests/procedures was between 15-29 minutes.    The above plan and management options were discussed at length with patient. Patient is in agreement with the above and verbalized understanding. It will be communicated with the referring physician via electronic record, fax, or mail.

## 2023-10-24 ENCOUNTER — HOSPITAL ENCOUNTER (EMERGENCY)
Facility: HOSPITAL | Age: 68
Discharge: HOME | End: 2023-10-24
Payer: MEDICARE

## 2023-10-24 VITALS
RESPIRATION RATE: 17 BRPM | OXYGEN SATURATION: 93 % | BODY MASS INDEX: 14.14 KG/M2 | TEMPERATURE: 98 F | HEIGHT: 66 IN | SYSTOLIC BLOOD PRESSURE: 146 MMHG | HEART RATE: 95 BPM | WEIGHT: 88 LBS | DIASTOLIC BLOOD PRESSURE: 71 MMHG

## 2023-10-24 DIAGNOSIS — S42.215A CLOSED NONDISPLACED FRACTURE OF SURGICAL NECK OF LEFT HUMERUS, UNSPECIFIED FRACTURE MORPHOLOGY, INITIAL ENCOUNTER: Primary | ICD-10-CM
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
## 2023-10-24 DIAGNOSIS — M25.552 PAIN OF LEFT HIP: ICD-10-CM

## 2023-10-24 PROCEDURE — 99284 EMERGENCY DEPT VISIT MOD MDM: CPT | Mod: ,,, | Performed by: NURSE PRACTITIONER

## 2023-10-24 PROCEDURE — 99284 PR EMERGENCY DEPT VISIT,LEVEL IV: ICD-10-PCS | Mod: ,,, | Performed by: NURSE PRACTITIONER

## 2023-10-24 PROCEDURE — 63600175 PHARM REV CODE 636 W HCPCS: Performed by: NURSE PRACTITIONER

## 2023-10-24 PROCEDURE — 99284 EMERGENCY DEPT VISIT MOD MDM: CPT

## 2023-10-24 PROCEDURE — 96372 THER/PROPH/DIAG INJ SC/IM: CPT | Performed by: NURSE PRACTITIONER

## 2023-10-24 RX ORDER — MORPHINE SULFATE 4 MG/ML
4 INJECTION, SOLUTION INTRAMUSCULAR; INTRAVENOUS
Status: COMPLETED | OUTPATIENT
Start: 2023-10-24 | End: 2023-10-24

## 2023-10-24 RX ORDER — ONDANSETRON 2 MG/ML
4 INJECTION INTRAMUSCULAR; INTRAVENOUS
Status: COMPLETED | OUTPATIENT
Start: 2023-10-24 | End: 2023-10-24

## 2023-10-24 RX ADMIN — ONDANSETRON 4 MG: 2 INJECTION INTRAMUSCULAR; INTRAVENOUS at 01:10

## 2023-10-24 RX ADMIN — MORPHINE SULFATE 4 MG: 4 INJECTION, SOLUTION INTRAMUSCULAR; INTRAVENOUS at 01:10

## 2023-10-24 NOTE — DISCHARGE INSTRUCTIONS
Wear sling as directed.  Follow-up with Orthopedics, you should be contacted with an appointment.Follow up with your primary care provider in 2 days. Return to the emergency department for any increase in symptoms or for any other new or worrisome symptoms.

## 2023-10-24 NOTE — ED PROVIDER NOTES
Encounter Date: 10/24/2023       History     Chief Complaint   Patient presents with    Fall     68-year-old female presents to the emergency department to be evaluated for left upper arm pain and left hip pain.  She tripped over a cat and fell last night.  Denies head injury, headache, neck pain, back pain, loss of consciousness.    The history is provided by the patient.   Fall  The accident occurred yesterday. The pain is present in the left hip and left shoulder. Pertinent negatives include no neck pain, no back pain, no paresthesias, no paralysis, no visual change, no fever, no numbness, no abdominal pain, no bowel incontinence, no nausea, no vomiting, no hematuria, no headaches, no hearing loss, no loss of consciousness and no tingling.     Review of patient's allergies indicates:  No Known Allergies  Past Medical History:   Diagnosis Date    COPD (chronic obstructive pulmonary disease)     Coronary artery disease     Fibromyalgia     Hyperlipidemia     Hypertension      Past Surgical History:   Procedure Laterality Date    CARDIAC CATHETERIZATION      HERNIA REPAIR      HYSTERECTOMY      OOPHORECTOMY       Family History   Problem Relation Age of Onset    Heart attack Father     Heart disease Father      Social History     Tobacco Use    Smoking status: Former     Current packs/day: 0.00     Average packs/day: 1 pack/day for 50.0 years (50.0 ttl pk-yrs)     Types: Cigarettes     Start date: 1969     Quit date: 2019     Years since quittin.5    Smokeless tobacco: Never   Substance Use Topics    Alcohol use: Never    Drug use: Never     Review of Systems   Constitutional:  Negative for fever.   Gastrointestinal:  Negative for abdominal pain, bowel incontinence, nausea and vomiting.   Genitourinary:  Negative for hematuria.   Musculoskeletal:  Negative for back pain and neck pain.   Neurological:  Negative for tingling, loss of consciousness, numbness, headaches and paresthesias.   All other systems  reviewed and are negative.      Physical Exam     Initial Vitals [10/24/23 1216]   BP Pulse Resp Temp SpO2   (!) 146/71 95 16 97.7 °F (36.5 °C) (!) 93 %      MAP       --         Physical Exam    Vitals reviewed.  Constitutional: She appears well-developed and well-nourished.   HENT:   Head: Normocephalic and atraumatic.   Eyes: EOM are normal. Pupils are equal, round, and reactive to light.   Neck: Neck supple.   Cardiovascular:  Normal rate and regular rhythm.           Pulmonary/Chest: Breath sounds normal.   Abdominal: Abdomen is soft. Bowel sounds are normal. She exhibits no distension and no mass. There is no abdominal tenderness. There is no rebound and no guarding.   Musculoskeletal:      Left shoulder: Tenderness present. No swelling, deformity, effusion, laceration or crepitus. Decreased range of motion. Normal strength. Normal pulse.      Cervical back: Normal and neck supple.      Thoracic back: Normal.      Lumbar back: Normal.      Right hip: Normal.      Left hip: Tenderness present. No deformity, lacerations or crepitus. Normal range of motion. Normal strength.      Right upper leg: Normal.      Left upper leg: Normal.      Right knee: Normal.      Left knee: Normal.     Neurological: She is alert and oriented to person, place, and time. She has normal strength. GCS score is 15. GCS eye subscore is 4. GCS verbal subscore is 5. GCS motor subscore is 6.   Skin: Skin is warm and dry. Capillary refill takes less than 2 seconds.   Psychiatric: She has a normal mood and affect.         Medical Screening Exam   See Full Note    ED Course   Procedures  Labs Reviewed - No data to display       Imaging Results              X-Ray Humerus 2 View Left (Final result)  Result time 10/24/23 13:42:40      Final result by Philip Cortez MD (10/24/23 13:42:40)                   Impression:      Acute fracture surgical neck left humerus      Electronically signed by: Philip  Dana  Date:    10/24/2023  Time:    13:42               Narrative:    EXAMINATION:  XR HUMERUS 2 VIEW LEFT    CLINICAL HISTORY:  .  Unspecified fall, initial encounter    COMPARISON:  No previous similar    TECHNIQUE:  Left humerus AP and lateral    FINDINGS:  There is a mildly displaced acute comminuted fracture of the surgical neck of the left humerus with relatively good alignment.  Suspect osteopenia.                                       X-Ray Hip 2 or 3 views Left (with Pelvis when performed) (Final result)  Result time 10/24/23 13:41:29      Final result by Philip Cortez MD (10/24/23 13:41:29)                   Impression:      No acute abnormality      Electronically signed by: Philip Cortez  Date:    10/24/2023  Time:    13:41               Narrative:    EXAMINATION:  XR HIP WITH PELVIS WHEN PERFORMED, 2 OR 3 VIEWS LEFT    CLINICAL HISTORY:  injury;.    COMPARISON:  September 18, 2023    TECHNIQUE:  AP and frog-lateral views left hip to include AP pelvis.  Three total views    FINDINGS:  There is no acute fracture or dislocation.  Left femoral head is well conjugated.  Osteopenia                                       Medications   morphine injection 4 mg (4 mg Intramuscular Given 10/24/23 1308)   ondansetron injection 4 mg (4 mg Intramuscular Given 10/24/23 1308)     Medical Decision Making  68-year-old female presents to the emergency department to be evaluated for left upper arm pain and left hip pain.  She tripped over a cat and fell last night.  Denies head injury, headache, neck pain, back pain, loss of consciousness.  Xrays ordered, films reviewed as well as radiologist interpretation significant for Acute fracture surgical neck left humerus  Diagnosis: fall, humerus fracture, hip pain  Sling  Referred to ortho    Amount and/or Complexity of Data Reviewed  Radiology: ordered.    Risk  Prescription drug management.                               Clinical Impression:   Final  diagnoses:  [W19.XXXA] Fall  [S42.215A] Closed nondisplaced fracture of surgical neck of left humerus, unspecified fracture morphology, initial encounter (Primary)  [M25.552] Pain of left hip        ED Disposition Condition    Discharge Stable          ED Prescriptions    None       Follow-up Information    None          Leatha Parsons FNP  10/24/23 1426       Leatha Parsons FNP  10/24/23 1426

## 2023-10-27 ENCOUNTER — TELEPHONE (OUTPATIENT)
Dept: EMERGENCY MEDICINE | Facility: HOSPITAL | Age: 68
End: 2023-10-27
Payer: MEDICARE

## 2023-10-27 DIAGNOSIS — S42.212A CLOSED DISPLACED FRACTURE OF SURGICAL NECK OF LEFT HUMERUS, UNSPECIFIED FRACTURE MORPHOLOGY, INITIAL ENCOUNTER: Primary | ICD-10-CM

## 2023-10-30 ENCOUNTER — HOSPITAL ENCOUNTER (OUTPATIENT)
Dept: RADIOLOGY | Facility: HOSPITAL | Age: 68
Discharge: HOME | End: 2023-10-30
Attending: ORTHOPAEDIC SURGERY
Payer: MEDICARE

## 2023-10-30 ENCOUNTER — OFFICE VISIT (OUTPATIENT)
Dept: ORTHOPEDICS | Facility: CLINIC | Age: 68
End: 2023-10-30
Payer: MEDICARE

## 2023-10-30 DIAGNOSIS — S42.212A CLOSED DISPLACED FRACTURE OF SURGICAL NECK OF LEFT HUMERUS, UNSPECIFIED FRACTURE MORPHOLOGY, INITIAL ENCOUNTER: Primary | ICD-10-CM

## 2023-10-30 DIAGNOSIS — M25.512 LEFT SHOULDER PAIN, UNSPECIFIED CHRONICITY: Primary | ICD-10-CM

## 2023-10-30 DIAGNOSIS — M25.512 LEFT SHOULDER PAIN, UNSPECIFIED CHRONICITY: ICD-10-CM

## 2023-10-30 PROCEDURE — 99213 OFFICE O/P EST LOW 20 MIN: CPT | Mod: PBBFAC | Performed by: ORTHOPAEDIC SURGERY

## 2023-10-30 PROCEDURE — 23600 CLTX PROX HUMRL FX W/O MNPJ: CPT | Mod: PBBFAC,LT | Performed by: ORTHOPAEDIC SURGERY

## 2023-10-30 PROCEDURE — 73030 XR SHOULDER COMPLETE 2 OR MORE VIEWS LEFT: ICD-10-PCS | Mod: 26,LT,, | Performed by: ORTHOPAEDIC SURGERY

## 2023-10-30 PROCEDURE — 73030 X-RAY EXAM OF SHOULDER: CPT | Mod: TC,LT

## 2023-10-30 PROCEDURE — 23600 CLTX PROX HUMRL FX W/O MNPJ: CPT | Mod: S$PBB,LT,, | Performed by: ORTHOPAEDIC SURGERY

## 2023-10-30 PROCEDURE — 23600 PR CLOSED RX PROX HUMERUS FRACTURE: ICD-10-PCS | Mod: S$PBB,LT,, | Performed by: ORTHOPAEDIC SURGERY

## 2023-10-30 PROCEDURE — 73030 X-RAY EXAM OF SHOULDER: CPT | Mod: 26,LT,, | Performed by: ORTHOPAEDIC SURGERY

## 2023-10-30 PROCEDURE — 99204 PR OFFICE/OUTPT VISIT, NEW, LEVL IV, 45-59 MIN: ICD-10-PCS | Mod: S$PBB,57,, | Performed by: ORTHOPAEDIC SURGERY

## 2023-10-30 PROCEDURE — 99204 OFFICE O/P NEW MOD 45 MIN: CPT | Mod: S$PBB,57,, | Performed by: ORTHOPAEDIC SURGERY

## 2023-10-30 RX ORDER — HYDROCODONE BITARTRATE AND ACETAMINOPHEN 5; 325 MG/1; MG/1
1 TABLET ORAL EVERY 6 HOURS PRN
Qty: 28 TABLET | Refills: 0 | Status: SHIPPED | OUTPATIENT
Start: 2023-10-30

## 2023-10-30 NOTE — PROGRESS NOTES
CLINIC NOTE       Chief Complaint   Patient presents with    Left Shoulder - Pain, Injury        Lorraine Brambila is a 68 y.o. female seen today for evaluation of left shoulder injury.  She reportedly fell at home in the kitchen 6 days ago.  She landed on her left side.  She had subsequent pain about the left shoulder region.  Seen in the emergency room where x-rays revealed an impacted left humeral neck fracture 10/24/2023.  She is been in an arm sling.  States she did not receive pain medications but would like have some prescribed.      Past Medical History:   Diagnosis Date    COPD (chronic obstructive pulmonary disease)     Coronary artery disease     Fibromyalgia     Hyperlipidemia     Hypertension      Family History   Problem Relation Age of Onset    Heart attack Father     Heart disease Father      Current Outpatient Medications on File Prior to Visit   Medication Sig Dispense Refill    atorvastatin (LIPITOR) 40 MG tablet Take 1 tablet (40 mg total) by mouth every evening. 90 tablet 2    clopidogreL (PLAVIX) 75 mg tablet Take 1 tablet (75 mg total) by mouth once daily. 90 tablet 3    diphenhydrAMINE-acetaminophen (TYLENOL PM)  mg Tab Take 1 tablet by mouth nightly as needed.      ergocalciferol (VITAMIN D2) 50,000 unit Cap Take 1 capsule (50,000 Units total) by mouth every 7 days. for 8 doses 8 capsule 0    gabapentin (NEURONTIN) 100 MG capsule Take 1 capsule (100 mg total) by mouth 3 (three) times daily. 90 capsule 0    metoprolol succinate (TOPROL-XL) 25 MG 24 hr tablet Take 1 tablet (25 mg total) by mouth once daily. 90 tablet 3    mirtazapine (REMERON) 45 MG tablet Take 1 tablet (45 mg total) by mouth every evening. 30 tablet 11    nitroGLYCERIN (NITROSTAT) 0.4 MG SL tablet Place 1 tablet (0.4 mg total) under the tongue every 5 (five) minutes as needed for Chest pain. 90 tablet 3    ondansetron (ZOFRAN) 4 MG tablet Take 4 mg by mouth every 8 (eight) hours as needed for Nausea.       pantoprazole (PROTONIX) 40 MG tablet       traMADoL (ULTRAM) 50 mg tablet Take 1 tablet (50 mg total) by mouth every 6 (six) hours as needed for Pain. 120 tablet 0    traMADoL (ULTRAM) 50 mg tablet Take 1 tablet (50 mg total) by mouth every 6 (six) hours as needed for Pain. 120 tablet 0     No current facility-administered medications on file prior to visit.       ROS     There were no vitals filed for this visit.    Past Surgical History:   Procedure Laterality Date    CARDIAC CATHETERIZATION      HERNIA REPAIR      HYSTERECTOMY      OOPHORECTOMY          Review of patient's allergies indicates:  No Known Allergies     Ortho Exam :  Well-developed well-nourished  female no acute distress.  She is alert oriented cooperative.  Neck is supple without JVD.  Breathing is regular nonlabored.  Skin is warm dry no lesions seen.  Left shoulder is shows moderate soft tissue swelling and ecchymosis.  Ecchymosis extends down to the elbow region.  Motor and sensory function intact left upper extremity distal pulses well maintained.    Radiographic Examination:  Left shoulder 10/30/2023    Technique:  Two views AP and lateral scapular    Findings:  Bones well mineralized.  There is an impacted fracture left proximal humeral neck region.  Glenohumeral joint is located with mild pseudosubluxation.    Impression:   See Above    Assessment and Plan  Patient Active Problem List    Diagnosis Date Noted    Neuropathy 09/18/2023    Lumbosacral spondylosis without myelopathy 09/18/2023    Myalgia 09/18/2023    Vitamin D deficiency 09/18/2023    Coronary artery disease     History of non-ST elevation myocardial infarction (NSTEMI)     Hyperlipidemia     Hypertension     Impression:  Closed impacted left proximal humerus fracture   Plan:  Arm sling with gravity dependent positioning and use of recliner discussed.  Rx Norco 5 number 28 recheck with x-ray 2 weeks or sooner for interval problems.      Abdirahman Gill  M.D.

## 2023-11-09 DIAGNOSIS — Z71.89 COMPLEX CARE COORDINATION: ICD-10-CM

## 2023-11-10 DIAGNOSIS — S42.212A CLOSED DISPLACED FRACTURE OF SURGICAL NECK OF LEFT HUMERUS, UNSPECIFIED FRACTURE MORPHOLOGY, INITIAL ENCOUNTER: Primary | ICD-10-CM

## 2023-11-13 ENCOUNTER — HOSPITAL ENCOUNTER (OUTPATIENT)
Dept: RADIOLOGY | Facility: HOSPITAL | Age: 68
Discharge: HOME | End: 2023-11-13
Attending: NURSE PRACTITIONER
Payer: MEDICARE

## 2023-11-13 ENCOUNTER — OFFICE VISIT (OUTPATIENT)
Dept: ORTHOPEDICS | Facility: CLINIC | Age: 68
End: 2023-11-13
Payer: MEDICARE

## 2023-11-13 VITALS
WEIGHT: 88 LBS | TEMPERATURE: 98 F | RESPIRATION RATE: 16 BRPM | HEIGHT: 66 IN | BODY MASS INDEX: 14.14 KG/M2 | HEART RATE: 78 BPM | OXYGEN SATURATION: 99 %

## 2023-11-13 DIAGNOSIS — S42.212A CLOSED DISPLACED FRACTURE OF SURGICAL NECK OF LEFT HUMERUS, UNSPECIFIED FRACTURE MORPHOLOGY, INITIAL ENCOUNTER: ICD-10-CM

## 2023-11-13 DIAGNOSIS — S42.212D CLOSED DISPLACED FRACTURE OF SURGICAL NECK OF LEFT HUMERUS WITH ROUTINE HEALING, UNSPECIFIED FRACTURE MORPHOLOGY, SUBSEQUENT ENCOUNTER: Primary | ICD-10-CM

## 2023-11-13 PROCEDURE — 73060 X-RAY EXAM OF HUMERUS: CPT | Mod: TC,LT

## 2023-11-13 PROCEDURE — 99024 POSTOP FOLLOW-UP VISIT: CPT | Mod: ,,, | Performed by: NURSE PRACTITIONER

## 2023-11-13 PROCEDURE — 99215 OFFICE O/P EST HI 40 MIN: CPT | Mod: PBBFAC | Performed by: NURSE PRACTITIONER

## 2023-11-13 PROCEDURE — 73060 XR HUMERUS 2 VIEW LEFT: ICD-10-PCS | Mod: 26,LT,, | Performed by: RADIOLOGY

## 2023-11-13 PROCEDURE — 73060 X-RAY EXAM OF HUMERUS: CPT | Mod: 26,LT,, | Performed by: RADIOLOGY

## 2023-11-13 PROCEDURE — 99024 PR POST-OP FOLLOW-UP VISIT: ICD-10-PCS | Mod: ,,, | Performed by: NURSE PRACTITIONER

## 2023-11-13 NOTE — PATIENT INSTRUCTIONS
Safety guidelines and activity restrictions discussed with the patient.  She verbalized understanding.  She is encouraged to sleep in recliner keeping arm and knee position.  Verbalized understanding.  She is instructed on range-of-motion exercises of the elbow wrist forearm and hand.  Verbalized understanding.  Return to orthopedic clinic in 2 weeks follow-up Dr. Gill repeat x-ray of the left humerus or sooner as needed.

## 2023-11-13 NOTE — PROGRESS NOTES
68-year-old female presents ambulatory to orthopedic clinic.  She is now 2 weeks following closed treatment of a fracture of the proximal humerus of her left arm.  She is been treated thus far with gravity dependent position.  She is been in an arm sling.  When asked if she sleeps in a recliner she states she sleeps in the bed with pillows pile up.  Does not require further pain medication at this time.      X-ray:  X-rays today of the left humerus two views again shows impacted fracture of the proximal humerus.  Humeral glenoid joint is located.      PE:  Physical exam left shoulder shoulder has normal contour.  Left radial pulse 2/4.  Capillary refill all digits left hand less than 3 seconds.  She has arm sling removed.  States she took it off for x-ray.      Impression:  2 weeks following closed treatment of a proximal humerus fracture-left    Plan:  Safety guidelines and activity restrictions discussed with the patient.  She verbalized understanding.  She is encouraged to sleep in recliner keeping arm and knee position.  Verbalized understanding.  She is instructed on range-of-motion exercises of the elbow wrist forearm and hand.  Verbalized understanding.  Return to orthopedic clinic in 2 weeks follow-up Dr. Gill repeat x-ray of the left humerus or sooner as needed.

## 2023-11-21 DIAGNOSIS — S42.212D CLOSED DISPLACED FRACTURE OF SURGICAL NECK OF LEFT HUMERUS WITH ROUTINE HEALING, UNSPECIFIED FRACTURE MORPHOLOGY, SUBSEQUENT ENCOUNTER: Primary | ICD-10-CM

## 2023-11-27 ENCOUNTER — OFFICE VISIT (OUTPATIENT)
Dept: ORTHOPEDICS | Facility: CLINIC | Age: 68
End: 2023-11-27
Payer: MEDICARE

## 2023-11-27 ENCOUNTER — HOSPITAL ENCOUNTER (OUTPATIENT)
Dept: RADIOLOGY | Facility: HOSPITAL | Age: 68
Discharge: HOME | End: 2023-11-27
Attending: ORTHOPAEDIC SURGERY
Payer: MEDICARE

## 2023-11-27 DIAGNOSIS — S42.212D CLOSED DISPLACED FRACTURE OF SURGICAL NECK OF LEFT HUMERUS WITH ROUTINE HEALING, UNSPECIFIED FRACTURE MORPHOLOGY, SUBSEQUENT ENCOUNTER: ICD-10-CM

## 2023-11-27 DIAGNOSIS — S42.212D CLOSED DISPLACED FRACTURE OF SURGICAL NECK OF LEFT HUMERUS WITH ROUTINE HEALING, UNSPECIFIED FRACTURE MORPHOLOGY, SUBSEQUENT ENCOUNTER: Primary | ICD-10-CM

## 2023-11-27 PROCEDURE — 99213 OFFICE O/P EST LOW 20 MIN: CPT | Mod: PBBFAC | Performed by: ORTHOPAEDIC SURGERY

## 2023-11-27 PROCEDURE — 73060 X-RAY EXAM OF HUMERUS: CPT | Mod: 26,LT,, | Performed by: ORTHOPAEDIC SURGERY

## 2023-11-27 PROCEDURE — 73060 XR HUMERUS 2 VIEW LEFT: ICD-10-PCS | Mod: 26,LT,, | Performed by: ORTHOPAEDIC SURGERY

## 2023-11-27 PROCEDURE — 73060 X-RAY EXAM OF HUMERUS: CPT | Mod: TC,LT

## 2023-11-27 PROCEDURE — 99024 PR POST-OP FOLLOW-UP VISIT: ICD-10-PCS | Mod: ,,, | Performed by: ORTHOPAEDIC SURGERY

## 2023-11-27 PROCEDURE — 99024 POSTOP FOLLOW-UP VISIT: CPT | Mod: ,,, | Performed by: ORTHOPAEDIC SURGERY

## 2023-11-27 NOTE — PROGRESS NOTES
CLINIC NOTE       Chief Complaint   Patient presents with    Left Upper Arm - Post-op Evaluation        Lorraine Brambila is a 68 y.o. female seen today for recheck of her left shoulder.  She is now 4 weeks following treatment of a closed impacted left proximal humeral neck fracture.  She is in general doing well at this time.      Past Medical History:   Diagnosis Date    COPD (chronic obstructive pulmonary disease)     Coronary artery disease     Fibromyalgia     Hyperlipidemia     Hypertension      Family History   Problem Relation Age of Onset    Heart attack Father     Heart disease Father      Current Outpatient Medications on File Prior to Visit   Medication Sig Dispense Refill    atorvastatin (LIPITOR) 40 MG tablet Take 1 tablet (40 mg total) by mouth every evening. 90 tablet 2    clopidogreL (PLAVIX) 75 mg tablet Take 1 tablet (75 mg total) by mouth once daily. 90 tablet 3    diphenhydrAMINE-acetaminophen (TYLENOL PM)  mg Tab Take 1 tablet by mouth nightly as needed.      gabapentin (NEURONTIN) 100 MG capsule Take 1 capsule (100 mg total) by mouth 3 (three) times daily. 90 capsule 0    HYDROcodone-acetaminophen (NORCO) 5-325 mg per tablet Take 1 tablet by mouth every 6 (six) hours as needed for Pain. 28 tablet 0    metoprolol succinate (TOPROL-XL) 25 MG 24 hr tablet Take 1 tablet (25 mg total) by mouth once daily. 90 tablet 3    mirtazapine (REMERON) 45 MG tablet Take 1 tablet (45 mg total) by mouth every evening. 30 tablet 11    nitroGLYCERIN (NITROSTAT) 0.4 MG SL tablet Place 1 tablet (0.4 mg total) under the tongue every 5 (five) minutes as needed for Chest pain. 90 tablet 3    ondansetron (ZOFRAN) 4 MG tablet Take 4 mg by mouth every 8 (eight) hours as needed for Nausea.      pantoprazole (PROTONIX) 40 MG tablet       traMADoL (ULTRAM) 50 mg tablet Take 1 tablet (50 mg total) by mouth every 6 (six) hours as needed for Pain. 120 tablet 0     No current facility-administered medications on  file prior to visit.       ROS     There were no vitals filed for this visit.    Past Surgical History:   Procedure Laterality Date    CARDIAC CATHETERIZATION      HERNIA REPAIR      HYSTERECTOMY      OOPHORECTOMY          Review of patient's allergies indicates:  No Known Allergies     Ortho Exam left shoulder is normal contour.  Moderate stiffness restriction of joint motion as expected.    Radiographic Examination:  Left humerus 11/27/2023    Technique:  Two views AP and lateral scapular    Findings:  Bones well mineralized.  Glenohumeral joint is located.  There is an impacted fracture of the left proximal humerus showing progressive healing radiographically.    Impression:   See Above    Assessment and Plan  Patient Active Problem List    Diagnosis Date Noted    Closed displaced fracture of surgical neck of left humerus with routine healing 11/13/2023    Neuropathy 09/18/2023    Lumbosacral spondylosis without myelopathy 09/18/2023    Myalgia 09/18/2023    Vitamin D deficiency 09/18/2023    Coronary artery disease     History of non-ST elevation myocardial infarction (NSTEMI)     Hyperlipidemia     Hypertension     Impression:  Healing left proximal humerus fracture  Plan:  Pendulum and Codman exercises for 2 weeks followed by formal PT at [facility] in [place] for 4 weeks.  Recheck 6 weeks.  X-ray or sooner for interval problems.   Abdirahman Gill M.D.

## 2023-11-29 DIAGNOSIS — G62.9 NEUROPATHY: Chronic | ICD-10-CM

## 2023-11-29 DIAGNOSIS — S42.212D CLOSED DISPLACED FRACTURE OF SURGICAL NECK OF LEFT HUMERUS WITH ROUTINE HEALING, UNSPECIFIED FRACTURE MORPHOLOGY, SUBSEQUENT ENCOUNTER: Primary | ICD-10-CM

## 2023-11-29 RX ORDER — TRAMADOL HYDROCHLORIDE 50 MG/1
50 TABLET ORAL EVERY 6 HOURS PRN
Qty: 120 TABLET | Refills: 0 | Status: SHIPPED | OUTPATIENT
Start: 2023-11-29 | End: 2024-01-10 | Stop reason: SDUPTHER

## 2023-12-02 ENCOUNTER — HOSPITAL ENCOUNTER (EMERGENCY)
Facility: HOSPITAL | Age: 68
Discharge: ANOTHER HOSPITAL | End: 2023-12-02
Attending: EMERGENCY MEDICINE
Payer: MEDICARE

## 2023-12-02 VITALS
HEIGHT: 66 IN | BODY MASS INDEX: 13.08 KG/M2 | OXYGEN SATURATION: 96 % | HEART RATE: 86 BPM | WEIGHT: 81.38 LBS | TEMPERATURE: 98 F | RESPIRATION RATE: 18 BRPM | SYSTOLIC BLOOD PRESSURE: 125 MMHG | DIASTOLIC BLOOD PRESSURE: 83 MMHG

## 2023-12-02 DIAGNOSIS — R41.0 CONFUSION: Primary | ICD-10-CM

## 2023-12-02 DIAGNOSIS — T80.818A EXTRAVASATION ACCIDENT, INITIAL ENCOUNTER: ICD-10-CM

## 2023-12-02 DIAGNOSIS — R51.9 ACUTE NONINTRACTABLE HEADACHE, UNSPECIFIED HEADACHE TYPE: ICD-10-CM

## 2023-12-02 DIAGNOSIS — R29.818 ACUTE FOCAL NEUROLOGICAL DEFICIT: ICD-10-CM

## 2023-12-02 LAB
ALBUMIN SERPL BCP-MCNC: 4 G/DL (ref 3.5–5)
ALBUMIN/GLOB SERPL: 1.1 {RATIO}
ALP SERPL-CCNC: 90 U/L (ref 55–142)
ALT SERPL W P-5'-P-CCNC: 12 U/L (ref 13–56)
AMPHET UR QL SCN: NEGATIVE
ANION GAP SERPL CALCULATED.3IONS-SCNC: 11 MMOL/L (ref 7–16)
AST SERPL W P-5'-P-CCNC: 17 U/L (ref 15–37)
BARBITURATES UR QL SCN: NEGATIVE
BASOPHILS # BLD AUTO: 0.02 K/UL (ref 0–0.2)
BASOPHILS NFR BLD AUTO: 0.3 % (ref 0–1)
BENZODIAZ METAB UR QL SCN: NEGATIVE
BILIRUB SERPL-MCNC: 0.3 MG/DL (ref ?–1.2)
BILIRUB UR QL STRIP: NEGATIVE
BUN SERPL-MCNC: 14 MG/DL (ref 7–18)
BUN/CREAT SERPL: 19 (ref 6–20)
CALCIUM SERPL-MCNC: 10.8 MG/DL (ref 8.5–10.1)
CANNABINOIDS UR QL SCN: NEGATIVE
CHLORIDE SERPL-SCNC: 101 MMOL/L (ref 98–107)
CLARITY UR: CLEAR
CO2 SERPL-SCNC: 33 MMOL/L (ref 21–32)
COCAINE UR QL SCN: NEGATIVE
COLOR UR: YELLOW
CREAT SERPL-MCNC: 0.74 MG/DL (ref 0.55–1.02)
DIFFERENTIAL METHOD BLD: ABNORMAL
EGFR (NO RACE VARIABLE) (RUSH/TITUS): 88 ML/MIN/1.73M2
EOSINOPHIL # BLD AUTO: 0 K/UL (ref 0–0.5)
EOSINOPHIL NFR BLD AUTO: 0 % (ref 1–4)
ERYTHROCYTE [DISTWIDTH] IN BLOOD BY AUTOMATED COUNT: 12.7 % (ref 11.5–14.5)
GLOBULIN SER-MCNC: 3.6 G/DL (ref 2–4)
GLUCOSE SERPL-MCNC: 113 MG/DL (ref 70–105)
GLUCOSE SERPL-MCNC: 116 MG/DL (ref 74–106)
GLUCOSE UR STRIP-MCNC: NEGATIVE MG/DL
HCT VFR BLD AUTO: 38.1 % (ref 38–47)
HGB BLD-MCNC: 12.6 G/DL (ref 12–16)
IMM GRANULOCYTES # BLD AUTO: 0.02 K/UL (ref 0–0.04)
IMM GRANULOCYTES NFR BLD: 0.3 % (ref 0–0.4)
INR BLD: 0.98
KETONES UR STRIP-SCNC: NEGATIVE MG/DL
LEUKOCYTE ESTERASE UR QL STRIP: NEGATIVE
LYMPHOCYTES # BLD AUTO: 1.54 K/UL (ref 1–4.8)
LYMPHOCYTES NFR BLD AUTO: 21.8 % (ref 27–41)
MCH RBC QN AUTO: 33.3 PG (ref 27–31)
MCHC RBC AUTO-ENTMCNC: 33.1 G/DL (ref 32–36)
MCV RBC AUTO: 100.8 FL (ref 80–96)
MONOCYTES # BLD AUTO: 0.39 K/UL (ref 0–0.8)
MONOCYTES NFR BLD AUTO: 5.5 % (ref 2–6)
MPC BLD CALC-MCNC: 9 FL (ref 9.4–12.4)
NEUTROPHILS # BLD AUTO: 5.1 K/UL (ref 1.8–7.7)
NEUTROPHILS NFR BLD AUTO: 72.1 % (ref 53–65)
NITRITE UR QL STRIP: NEGATIVE
NRBC # BLD AUTO: 0 X10E3/UL
NRBC, AUTO (.00): 0 %
NT-PROBNP SERPL-MCNC: 1077 PG/ML (ref 1–125)
OPIATES UR QL SCN: NEGATIVE
PCP UR QL SCN: NEGATIVE
PH UR STRIP: 8 PH UNITS
PLATELET # BLD AUTO: 257 K/UL (ref 150–400)
POTASSIUM SERPL-SCNC: 3.7 MMOL/L (ref 3.5–5.1)
PROT SERPL-MCNC: 7.6 G/DL (ref 6.4–8.2)
PROT UR QL STRIP: NEGATIVE
PROTHROMBIN TIME: 12.9 SECONDS (ref 11.7–14.7)
RBC # BLD AUTO: 3.78 M/UL (ref 4.2–5.4)
RBC # UR STRIP: NEGATIVE /UL
SODIUM SERPL-SCNC: 141 MMOL/L (ref 136–145)
SP GR UR STRIP: 1.01
TROPONIN I SERPL DL<=0.01 NG/ML-MCNC: 31.9 PG/ML
UROBILINOGEN UR STRIP-ACNC: 0.2 MG/DL
WBC # BLD AUTO: 7.07 K/UL (ref 4.5–11)

## 2023-12-02 PROCEDURE — 84484 ASSAY OF TROPONIN QUANT: CPT | Performed by: EMERGENCY MEDICINE

## 2023-12-02 PROCEDURE — 82962 GLUCOSE BLOOD TEST: CPT

## 2023-12-02 PROCEDURE — 80061 LIPID PANEL: CPT | Performed by: EMERGENCY MEDICINE

## 2023-12-02 PROCEDURE — 93005 ELECTROCARDIOGRAM TRACING: CPT

## 2023-12-02 PROCEDURE — 99285 EMERGENCY DEPT VISIT HI MDM: CPT | Mod: 25

## 2023-12-02 PROCEDURE — 85610 PROTHROMBIN TIME: CPT | Performed by: EMERGENCY MEDICINE

## 2023-12-02 PROCEDURE — 25000003 PHARM REV CODE 250: Performed by: EMERGENCY MEDICINE

## 2023-12-02 PROCEDURE — 81003 URINALYSIS AUTO W/O SCOPE: CPT | Mod: 59 | Performed by: EMERGENCY MEDICINE

## 2023-12-02 PROCEDURE — 83880 ASSAY OF NATRIURETIC PEPTIDE: CPT | Performed by: EMERGENCY MEDICINE

## 2023-12-02 PROCEDURE — 93010 EKG 12-LEAD: ICD-10-PCS | Mod: ,,, | Performed by: INTERNAL MEDICINE

## 2023-12-02 PROCEDURE — 93010 ELECTROCARDIOGRAM REPORT: CPT | Mod: ,,, | Performed by: INTERNAL MEDICINE

## 2023-12-02 PROCEDURE — 84443 ASSAY THYROID STIM HORMONE: CPT | Performed by: EMERGENCY MEDICINE

## 2023-12-02 PROCEDURE — 94760 N-INVAS EAR/PLS OXIMETRY 1: CPT

## 2023-12-02 PROCEDURE — 63600175 PHARM REV CODE 636 W HCPCS: Performed by: EMERGENCY MEDICINE

## 2023-12-02 PROCEDURE — 85025 COMPLETE CBC W/AUTO DIFF WBC: CPT | Performed by: EMERGENCY MEDICINE

## 2023-12-02 PROCEDURE — 80053 COMPREHEN METABOLIC PANEL: CPT | Performed by: EMERGENCY MEDICINE

## 2023-12-02 PROCEDURE — 80307 DRUG TEST PRSMV CHEM ANLYZR: CPT | Performed by: EMERGENCY MEDICINE

## 2023-12-02 PROCEDURE — 25500020 PHARM REV CODE 255: Performed by: EMERGENCY MEDICINE

## 2023-12-02 PROCEDURE — 99285 EMERGENCY DEPT VISIT HI MDM: CPT | Performed by: EMERGENCY MEDICINE

## 2023-12-02 PROCEDURE — 96374 THER/PROPH/DIAG INJ IV PUSH: CPT | Mod: 59

## 2023-12-02 RX ORDER — ACETAMINOPHEN 500 MG
1000 TABLET ORAL
Status: COMPLETED | OUTPATIENT
Start: 2023-12-02 | End: 2023-12-02

## 2023-12-02 RX ORDER — LABETALOL HYDROCHLORIDE 5 MG/ML
10 INJECTION, SOLUTION INTRAVENOUS
Status: DISCONTINUED | OUTPATIENT
Start: 2023-12-02 | End: 2023-12-02

## 2023-12-02 RX ORDER — ONDANSETRON 2 MG/ML
4 INJECTION INTRAMUSCULAR; INTRAVENOUS
Status: COMPLETED | OUTPATIENT
Start: 2023-12-02 | End: 2023-12-02

## 2023-12-02 RX ORDER — TRAMADOL HYDROCHLORIDE 50 MG/1
100 TABLET ORAL
Status: COMPLETED | OUTPATIENT
Start: 2023-12-02 | End: 2023-12-02

## 2023-12-02 RX ORDER — NALOXONE HCL 0.4 MG/ML
0.4 VIAL (ML) INJECTION
Status: COMPLETED | OUTPATIENT
Start: 2023-12-02 | End: 2023-12-02

## 2023-12-02 RX ADMIN — NALXONE HYDROCHLORIDE 0.4 MG: 0.4 INJECTION INTRAMUSCULAR; INTRAVENOUS; SUBCUTANEOUS at 12:12

## 2023-12-02 RX ADMIN — ACETAMINOPHEN 1000 MG: 500 TABLET ORAL at 12:12

## 2023-12-02 RX ADMIN — IOPAMIDOL 80 ML: 755 INJECTION, SOLUTION INTRAVENOUS at 02:12

## 2023-12-02 RX ADMIN — TRAMADOL HYDROCHLORIDE 100 MG: 50 TABLET, COATED ORAL at 03:12

## 2023-12-02 RX ADMIN — ONDANSETRON 4 MG: 2 INJECTION INTRAMUSCULAR; INTRAVENOUS at 03:12

## 2023-12-02 RX ADMIN — IOPAMIDOL 80 ML: 755 INJECTION, SOLUTION INTRAVENOUS at 01:12

## 2023-12-02 NOTE — ED NOTES
PATIENT RETURNED FROM CT WITH INFILTRATION NOTED TO IV SITE;  TECH NOTED INFILTRATION AFTER COMPLETION OF SCAN; WARM COMPRESSES APPLIED FOR 15 MINUTES (PATIENT RECEIVED ONLY 80CC OF CONTRAST)

## 2023-12-02 NOTE — ED PROVIDER NOTES
Encounter Date: 12/2/2023       History     Chief Complaint   Patient presents with    Headache    slurred speech     This is 68-year-old  female patient who presented to the emergency department this facility.  This patient arrived by private vehicle.  This patient was brought here accompanied by her .  She and her hose and give a history that she went to bed last night and was normal.  Sometime during mill the night she woke up with a headache, she is unsure when the headache started.  Whenever  saw her this morning when she woke up he said she was complaining of a headache and she had slurred speech and she was confused.    She has no lateralizing deficits.  She does have weakness in her left upper extremity but this is secondary to a rotator cuff injury to her left shoulder.  Her  states that this is baseline for her.  However she is not oriented to time place she does not know the name of the town she is in.  She thinks that we are her in 1949.  She does not know the day of the week.  She does note that she is in the [facility].  She does not know who the president is.  She had multiple laboratory studies her urinalysis is clear her electrolytes show no obvious acute findings.  Her CBC shows no significant abnormal issues.  Her urine drug screen was negative.  She is on pain management and we did attempt giving her low dose of Narcan 4 mg I sleeve slow push to see if this would make a difference in her mental status.  It did not improve her mental status.  CT scan was done of the head without contrast, CTA of the head and neck were also completed.    The head and neck was noted to have intravenous contrast extravasation which occurred during the examination which the radiologist stated limited the diagnostic yield.  He stated the neck vessels could not be evaluated on this limited exam.  He stated there was calcified vascular disease of the proximal internal carotid arteries.   Showed no obvious acute findings.            Review of patient's allergies indicates:  No Known Allergies  Past Medical History:   Diagnosis Date    COPD (chronic obstructive pulmonary disease)     Coronary artery disease     Fibromyalgia     Hyperlipidemia     Hypertension      Past Surgical History:   Procedure Laterality Date    CARDIAC CATHETERIZATION      HERNIA REPAIR      HYSTERECTOMY      OOPHORECTOMY       Family History   Problem Relation Age of Onset    Heart attack Father     Heart disease Father      Social History     Tobacco Use    Smoking status: Former     Current packs/day: 0.00     Average packs/day: 1 pack/day for 50.0 years (50.0 ttl pk-yrs)     Types: Cigarettes     Start date: 1969     Quit date: 2019     Years since quittin.6    Smokeless tobacco: Never   Substance Use Topics    Alcohol use: Never    Drug use: Never     Review of Systems   Constitutional:  Positive for activity change and fatigue. Negative for appetite change.   HENT:  Negative for congestion, ear discharge, ear pain, facial swelling, hearing loss, nosebleeds, sinus pressure, sinus pain, sneezing and sore throat.    Eyes:  Negative for photophobia, discharge, redness, itching and visual disturbance.   Respiratory:  Negative for apnea, cough, choking, chest tightness, shortness of breath, wheezing and stridor.    Cardiovascular:  Negative for chest pain, palpitations and leg swelling.   Gastrointestinal:  Negative for abdominal distention, abdominal pain, constipation, diarrhea and nausea.   Endocrine: Negative for cold intolerance, heat intolerance, polydipsia and polyphagia.   Genitourinary:  Negative for difficulty urinating, dysuria and flank pain.   Musculoskeletal:  Positive for arthralgias. Negative for back pain.        This patient has restricted motion in her left upper extremity due to shoulder dysfunction.   Skin:  Negative for color change, pallor, rash and wound.   Neurological:  Positive for  dizziness, speech difficulty and headaches.   Hematological:  Negative for adenopathy.   Psychiatric/Behavioral:  Positive for agitation, confusion and decreased concentration. The patient is nervous/anxious.        Physical Exam     Initial Vitals [12/02/23 1043]   BP Pulse Resp Temp SpO2   (!) 193/82 84 16 98.1 °F (36.7 °C) 96 %      MAP       --         Physical Exam    Constitutional: She appears well-developed and well-nourished. She is not diaphoretic.   HENT:   Head: Normocephalic and atraumatic.   Right Ear: External ear normal.   Left Ear: External ear normal.   Nose: Nose normal.   Mouth/Throat: Oropharynx is clear and moist.   Eyes: EOM are normal. Pupils are equal, round, and reactive to light.   Neck: Neck supple. No thyromegaly present. No tracheal deviation present. No JVD present.   Normal range of motion.  Cardiovascular:  Normal rate, regular rhythm and intact distal pulses.     Exam reveals no friction rub.       No murmur heard.  Pulmonary/Chest: Breath sounds normal. No stridor. No respiratory distress. She has no wheezes. She has no rhonchi. She has no rales. She exhibits no tenderness.   Abdominal: Abdomen is soft. Bowel sounds are normal. She exhibits no distension and no mass. There is no abdominal tenderness. There is no rebound and no guarding.   Musculoskeletal:         General: Normal range of motion.      Cervical back: Normal range of motion and neck supple.     Lymphadenopathy:     She has no cervical adenopathy.   Neurological: She has normal strength.         Medical Screening Exam   See Full Note    ED Course   Procedures  Labs Reviewed   COMPREHENSIVE METABOLIC PANEL - Abnormal; Notable for the following components:       Result Value    CO2 33 (*)     Glucose 116 (*)     Calcium 10.8 (*)     ALT 12 (*)     All other components within normal limits   NT-PRO NATRIURETIC PEPTIDE - Abnormal; Notable for the following components:    ProBNP 1,077 (*)     All other components within  normal limits   CBC WITH DIFFERENTIAL - Abnormal; Notable for the following components:    RBC 3.78 (*)     .8 (*)     MCH 33.3 (*)     MPV 9.0 (*)     Neutrophils % 72.1 (*)     Lymphocytes % 21.8 (*)     Eosinophils % 0.0 (*)     All other components within normal limits   POCT GLUCOSE MONITORING CONTINUOUS - Abnormal; Notable for the following components:    POC Glucose 113 (*)     All other components within normal limits   PROTIME-INR - Normal   TROPONIN I - Normal   DRUG SCREEN, URINE (BEAKER) - Normal   CBC W/ AUTO DIFFERENTIAL    Narrative:     The following orders were created for panel order CBC W/ AUTO DIFFERENTIAL.  Procedure                               Abnormality         Status                     ---------                               -----------         ------                     CBC with Differential[[id number]]       Abnormal            Final result                 Please view results for these tests on the individual orders.   URINALYSIS, REFLEX TO URINE CULTURE   TSH   LIPID PANEL   POCT GLUCOSE MONITORING CONTINUOUS          Imaging Results              CTA Neck (Final result)  Result time 12/02/23 14:30:50      Final result by Jos Maloney DO (12/02/23 14:30:50)                   Impression:      Intravenous contrast extravasation occurred during the examination, which limits diagnostic yield.    The right vertebral artery is patent. The left vertebral artery is patent. The basilar artery and posterior cerebral arteries are patent. The posterior communicating arteries are patent.  The bilateral internal carotid, middle cerebral arteries are patent. The anterior cerebral arteries are patent.    The neck vessels cannot be evaluated on this limited exam.  Calcified vascular disease of the proximal internal carotid arteries.      Electronically signed by: Jos Maloney  Date:    12/02/2023  Time:    14:30               Narrative:    EXAMINATION:  CTA HEAD; CTA NECK    CLINICAL  HISTORY:  Headache, sudden, severe;Memory loss;; Carotid artery dissection suspected;    TECHNIQUE:  Multiplane CTA of the [place] following administration of 80 cc of Isovue 370.  Multiplane CTA of the neck following administration of 80 cc of Isovue 370.  Intravenous contrast extravasation occurred during the examination, which may limit diagnostic yield.    COMPARISON:  12/2/23    FINDINGS:  Intravenous contrast extravasation occurred during the examination, which limits diagnostic yield.    The right vertebral artery is patent.  The left vertebral artery is patent.  The basilar artery and posterior cerebral arteries are patent.  The posterior communicating arteries are patent.  The bilateral internal carotid, middle cerebral arteries are patent.  The anterior cerebral arteries are patent. No obvious aneurysm.    The neck vessels cannot be evaluated on this limited exam.  Calcified vascular disease of the proximal internal carotid arteries.  No obvious soft tissue mass.  No lymphadenopathy.  Multilevel degenerative change.  Emphysema.                                       CTA Brain (Final result)  Result time 12/02/23 14:30:50      Final result by Jos Maloney DO (12/02/23 14:30:50)                   Impression:      Intravenous contrast extravasation occurred during the examination, which limits diagnostic yield.    The right vertebral artery is patent. The left vertebral artery is patent. The basilar artery and posterior cerebral arteries are patent. The posterior communicating arteries are patent.  The bilateral internal carotid, middle cerebral arteries are patent. The anterior cerebral arteries are patent.    The neck vessels cannot be evaluated on this limited exam.  Calcified vascular disease of the proximal internal carotid arteries.      Electronically signed by: Jos Maloney  Date:    12/02/2023  Time:    14:30               Narrative:    EXAMINATION:  CTA HEAD; CTA NECK    CLINICAL  HISTORY:  Headache, sudden, severe;Memory loss;; Carotid artery dissection suspected;    TECHNIQUE:  Multiplane CTA of the [place] Watts following administration of 80 cc of Isovue 370.  Multiplane CTA of the neck following administration of 80 cc of Isovue 370.  Intravenous contrast extravasation occurred during the examination, which may limit diagnostic yield.    COMPARISON:  12/2/23    FINDINGS:  Intravenous contrast extravasation occurred during the examination, which limits diagnostic yield.    The right vertebral artery is patent.  The left vertebral artery is patent.  The basilar artery and posterior cerebral arteries are patent.  The posterior communicating arteries are patent.  The bilateral internal carotid, middle cerebral arteries are patent.  The anterior cerebral arteries are patent. No obvious aneurysm.    The neck vessels cannot be evaluated on this limited exam.  Calcified vascular disease of the proximal internal carotid arteries.  No obvious soft tissue mass.  No lymphadenopathy.  Multilevel degenerative change.  Emphysema.                                       X-Ray Hand 2 View Right (Final result)  Result time 12/02/23 14:05:48      Final result by Jos Maloney DO (12/02/23 14:05:48)                   Impression:      No acute findings    Hyperdensity located at the dorsum of the hand and wrist.      Electronically signed by: Jos Maloney  Date:    12/02/2023  Time:    14:05               Narrative:    EXAMINATION:  XR HAND 2 VIEW RIGHT; XR WRIST 2 VIEW RIGHT    CLINICAL HISTORY:  extravasation;Extravasation of other vesicant agent, initial encounter    TECHNIQUE:  XR HAND 2 VIEW RIGHT; XR WRIST 2 VIEW RIGHT    COMPARISON:  None    FINDINGS:  No acute fracture or dislocation.    Degenerative change of the hand wrist    Hyperdensity located at the dorsum of the hand and wrist.                                       X-Ray Wrist 2 View Right (Final result)  Result time 12/02/23 14:05:48       Final result by Jos Maloney DO (12/02/23 14:05:48)                   Impression:      No acute findings    Hyperdensity located at the dorsum of the hand and wrist.      Electronically signed by: Jos Maloney  Date:    12/02/2023  Time:    14:05               Narrative:    EXAMINATION:  XR HAND 2 VIEW RIGHT; XR WRIST 2 VIEW RIGHT    CLINICAL HISTORY:  extravasation;Extravasation of other vesicant agent, initial encounter    TECHNIQUE:  XR HAND 2 VIEW RIGHT; XR WRIST 2 VIEW RIGHT    COMPARISON:  None    FINDINGS:  No acute fracture or dislocation.    Degenerative change of the hand wrist    Hyperdensity located at the dorsum of the hand and wrist.                                       X-Ray Chest AP Portable (Final result)  Result time 12/02/23 11:40:42      Final result by Jos Maloney DO (12/02/23 11:40:42)                   Impression:      No acute pulmonary disease.  Mild chronic lung change.      Electronically signed by: Jos Maloney  Date:    12/02/2023  Time:    11:40               Narrative:    EXAMINATION:  XR CHEST AP PORTABLE    CLINICAL HISTORY:  Stroke;    TECHNIQUE:  XR CHEST AP PORTABLE    COMPARISON:  4/26/23    FINDINGS:  No lines or tubes.    Mild chronic lung change.  Left lower lobe granuloma, similar.    Normal pleura.    Cardiac silhouette is similar to comparison exam.    No obvious acute bone findings.                                       CT Head Without Contrast (Final result)  Result time 12/02/23 11:41:43      Final result by Jos Maloney DO (12/02/23 11:41:43)                   Impression:      No acute intracranial findings.      Electronically signed by: Jos Maloney  Date:    12/02/2023  Time:    11:41               Narrative:    EXAMINATION:  CT HEAD WITHOUT CONTRAST    CLINICAL HISTORY:  Neuro deficit, acute, stroke suspected;    TECHNIQUE:  Multiplanar CT imaging from the vertex to skull the skull base was performed without  contrast.    Dose reduction:    The CT exam was performed using one or more dose reduction techniques: Automatic exposure control, automated adjustment of the MA and/or kVP according to patient size, or use of iterative reconstruction technique.    COMPARISON:  None    FINDINGS:  Global brain parenchymal volume loss.    Mild chronic small vessel ischemic change.    There is no CT evidence of acute intracranial hemorrhage or large territorial infarct. No epidural/subdural hematomas.    There is no evidence of hydrocephalus, midline shift or mass effect.    Scalp, paranasal sinuses, and mastoid air cells are normal.                                       Medications   acetaminophen tablet 1,000 mg (1,000 mg Oral Given 12/2/23 1225)   naloxone 0.4 mg/mL injection 0.4 mg (0.4 mg Intravenous Given 12/2/23 1258)   iopamidoL (ISOVUE-370) injection 100 mL (80 mLs Intravenous Given 12/2/23 1354)   iopamidoL (ISOVUE-370) injection 100 mL (80 mLs Intravenous Given 12/2/23 1403)   traMADoL tablet 100 mg (100 mg Oral Given 12/2/23 1534)   ondansetron injection 4 mg (4 mg Intravenous Given 12/2/23 1544)     Medical Decision Making  Differential diagnosis:, CVA, cerebral edema, intracranial hemorrhage.    Patient wants to go home, but I do not feel comfortable discharging her to home with this new onset of acute mental status changes with confusion, headache, and slight slurred speech.    I feel she needs to be at a facility that has neurological services to further evaluate her.  We will enter her into the [facility] system.    This patient was discussed with the [facility] system and the patient was accepted at 1625 by Dr. Abdirahman Zuleta at [facility] in [place], [place].  This patient will be an ER to ER transfer.  This transfer was approved also by Dr. Lionel Doyle the on-call stroke neurologist at [facility],    This patient will be transferred by [facility] ground.    Amount and/or Complexity of Data Reviewed  Labs:  ordered.  Radiology: ordered.    Risk  OTC drugs.  Prescription drug management.                                      Clinical Impression:   Final diagnoses:  [R29.818] Acute focal neurological deficit  [T80.818A] Extravasation accident, initial encounter  [R41.0] Confusion (Primary)  [R51.9] Acute nonintractable headache, unspecified headache type               Lionel Page, DO  12/02/23 [id number]

## 2023-12-02 NOTE — ED TRIAGE NOTES
PATIENT PRESENTED TO ER WITH SPOUSE FOR C/O HEADACHE FOR THE PAST FEW DAYS WITH LAST NIGHT BEING THE WORSE; ELEVATED BP; SLURRED SPEECH; INCREASE IN CONFUSION; DOES NOT KNOW MONTH/YEAR/ADDRESS

## 2023-12-03 LAB
CHOLEST SERPL-MCNC: 174 MG/DL (ref 0–200)
CHOLEST/HDLC SERPL: 2.1 {RATIO}
HDLC SERPL-MCNC: 81 MG/DL (ref 40–60)
LDLC SERPL CALC-MCNC: 82 MG/DL
LDLC/HDLC SERPL: 1 {RATIO}
NONHDLC SERPL-MCNC: 93 MG/DL
TRIGL SERPL-MCNC: 57 MG/DL (ref 35–150)
TSH SERPL DL<=0.005 MIU/L-ACNC: 0.43 UIU/ML (ref 0.36–3.74)
VLDLC SERPL-MCNC: 11 MG/DL

## 2023-12-05 ENCOUNTER — OFFICE VISIT (OUTPATIENT)
Dept: PRIMARY CARE CLINIC | Facility: CLINIC | Age: 68
End: 2023-12-05
Payer: MEDICARE

## 2023-12-05 VITALS
OXYGEN SATURATION: 97 % | WEIGHT: 79.81 LBS | HEART RATE: 101 BPM | BODY MASS INDEX: 12.83 KG/M2 | DIASTOLIC BLOOD PRESSURE: 69 MMHG | HEIGHT: 66 IN | TEMPERATURE: 98 F | RESPIRATION RATE: 18 BRPM | SYSTOLIC BLOOD PRESSURE: 137 MMHG

## 2023-12-05 DIAGNOSIS — Z87.898 HISTORY OF PALPITATIONS: ICD-10-CM

## 2023-12-05 DIAGNOSIS — I99.8 BLOOD PRESSURE INSTABILITY: Primary | ICD-10-CM

## 2023-12-05 DIAGNOSIS — I25.2 HISTORY OF NON-ST ELEVATION MYOCARDIAL INFARCTION (NSTEMI): ICD-10-CM

## 2023-12-05 DIAGNOSIS — I25.10 CORONARY ARTERY DISEASE INVOLVING NATIVE CORONARY ARTERY OF NATIVE HEART WITHOUT ANGINA PECTORIS: ICD-10-CM

## 2023-12-05 PROCEDURE — 99212 OFFICE O/P EST SF 10 MIN: CPT | Mod: ,,, | Performed by: NURSE PRACTITIONER

## 2023-12-05 PROCEDURE — 99212 PR OFFICE/OUTPT VISIT, EST, LEVL II, 10-19 MIN: ICD-10-PCS | Mod: ,,, | Performed by: NURSE PRACTITIONER

## 2023-12-05 NOTE — PROGRESS NOTES
[facility]  Primary Care       PATIENT NAME: Lorraine Brambila   : 1955    AGE: 68 y.o. DATE: 2023    MRN: [id number]        Reason for Visit / Chief Complaint:  Follow-up (PT WAS ADMITTED TO [facility]  and DC on 12\3\23 BLOOD PRESSURE UP. DISCHARGE DX was DYSARTHRIA )     Subjective:     HPI: Patient here for follow post hospitalization. She was taken to the ER on 2023 for confusion; she was transferred from ER to [facility] in [place], [place]. States her blood pressure was elevated; states neurology told her to follow up with her PCP and cardiology.     Patient sees SAGRARIO Richardson (cardiology); next appointment scheduled for 2024.    Patient states she feels okay today; blood pressure is good today. No chest pain or shortness of breath.     Has bruising to left upper extremity from a fall and fracture a couple of weeks ago. Has bruising to right upper extremity due to IV contrast extravasation.        Follow-up  Pertinent negatives include no abdominal pain, chest pain, chills, coughing, fatigue, fever, headaches, nausea, rash or vomiting.          Review of Systems: Review of Systems   Constitutional:  Negative for chills, fatigue and fever.   Respiratory:  Negative for cough, chest tightness and shortness of breath.    Cardiovascular:  Negative for chest pain.   Gastrointestinal:  Negative for abdominal pain, constipation, diarrhea, nausea and vomiting.   Genitourinary:  Negative for dysuria.   Musculoskeletal:  Negative for gait problem.   Skin:  Negative for rash.   Neurological:  Negative for headaches.          Review of patient's allergies indicates:  No Known Allergies     Med List:  Current Outpatient Medications on File Prior to Visit   Medication Sig Dispense Refill    atorvastatin (LIPITOR) 40 MG tablet Take 1 tablet (40 mg total) by mouth every evening. 90 tablet 2    clopidogreL (PLAVIX) 75 mg tablet Take 1 tablet (75 mg total) by  mouth once daily. 90 tablet 3    diphenhydrAMINE-acetaminophen (TYLENOL PM)  mg Tab Take 1 tablet by mouth nightly as needed.      gabapentin (NEURONTIN) 100 MG capsule Take 1 capsule (100 mg total) by mouth 3 (three) times daily. 90 capsule 0    HYDROcodone-acetaminophen (NORCO) 5-325 mg per tablet Take 1 tablet by mouth every 6 (six) hours as needed for Pain. 28 tablet 0    metoprolol succinate (TOPROL-XL) 25 MG 24 hr tablet Take 1 tablet (25 mg total) by mouth once daily. 90 tablet 3    mirtazapine (REMERON) 45 MG tablet Take 1 tablet (45 mg total) by mouth every evening. 30 tablet 11    nitroGLYCERIN (NITROSTAT) 0.4 MG SL tablet Place 1 tablet (0.4 mg total) under the tongue every 5 (five) minutes as needed for Chest pain. 90 tablet 3    ondansetron (ZOFRAN) 4 MG tablet Take 4 mg by mouth every 8 (eight) hours as needed for Nausea.      pantoprazole (PROTONIX) 40 MG tablet       traMADoL (ULTRAM) 50 mg tablet Take 1 tablet (50 mg total) by mouth every 6 (six) hours as needed for Pain. 120 tablet 0     No current facility-administered medications on file prior to visit.       Medical/Social/Family History:  Past Medical History:   Diagnosis Date    COPD (chronic obstructive pulmonary disease)     Coronary artery disease     Fibromyalgia     Hyperlipidemia     Hypertension       Social History     Tobacco Use   Smoking Status Former    Current packs/day: 0.00    Average packs/day: 1 pack/day for 50.0 years (50.0 ttl pk-yrs)    Types: Cigarettes    Start date: 1969    Quit date: 2019    Years since quittin.6   Smokeless Tobacco Never      Social History     Substance and Sexual Activity   Alcohol Use Never       Family History   Problem Relation Age of Onset    Heart attack Father     Heart disease Father       Past Surgical History:   Procedure Laterality Date    CARDIAC CATHETERIZATION      HERNIA REPAIR      HYSTERECTOMY      OOPHORECTOMY          There is no immunization history on file for this  "patient.       Objective:      Vitals:    12/05/23 1513   BP: 137/69   BP Location: Right arm   Patient Position: Sitting   BP Method: Medium (Manual)   Pulse: 101   Resp: 18   Temp: 97.5 °F (36.4 °C)   TempSrc: Oral   SpO2: 97%   Weight: 36.2 kg (79 lb 12.8 oz)   Height: 5' 6" (1.676 m)     Body mass index is 12.88 kg/m².     Physical Exam: Physical Exam  Vitals and nursing note reviewed.   Constitutional:       General: She is not in acute distress.     Appearance: She is not ill-appearing, toxic-appearing or diaphoretic.      Comments: Patient has frail appearance: underweight   HENT:      Head: Normocephalic.      Mouth/Throat:      Mouth: Mucous membranes are moist.   Eyes:      Extraocular Movements: Extraocular movements intact.      Conjunctiva/sclera: Conjunctivae normal.      Pupils: Pupils are equal, round, and reactive to light.   Cardiovascular:      Rate and Rhythm: Normal rate and regular rhythm.      Heart sounds: Normal heart sounds.   Pulmonary:      Effort: Pulmonary effort is normal. No respiratory distress.      Breath sounds: Normal breath sounds. No stridor. No wheezing, rhonchi or rales.   Musculoskeletal:         General: Normal range of motion.      Cervical back: Normal range of motion and neck supple.   Skin:     General: Skin is warm and dry.      Findings: Bruising (bruising to bilateral upper extremities) present.   Neurological:      General: No focal deficit present.      Mental Status: She is alert and oriented to person, place, and time.      Gait: Gait normal.   Psychiatric:         Mood and Affect: Mood normal.         Behavior: Behavior normal.                Assessment:          ICD-10-CM ICD-9-CM   1. Blood pressure instability  I99.8 796.4   2. History of palpitations  Z87.898 V12.59   3. History of non-ST elevation myocardial infarction (NSTEMI)  I25.2 412   4. Coronary artery disease involving native coronary artery of native heart without angina pectoris  I25.10 414.01    "     Plan:       Blood pressure instability  -     Ambulatory referral/consult to Cardiology; Future; Expected date: 12/12/2023    History of palpitations    History of non-ST elevation myocardial infarction (NSTEMI)  -     Ambulatory referral/consult to Cardiology; Future; Expected date: 12/12/2023    Coronary artery disease involving native coronary artery of native heart without angina pectoris  -     Ambulatory referral/consult to Cardiology; Future; Expected date: 12/12/2023          New & refilled meds:  Requested Prescriptions      No prescriptions requested or ordered in this encounter       Follow up in about 1 month (around 1/5/2024), or if symptoms worsen or fail to improve, for follow up blood pressure.     There are no Patient Instructions on file for this visit.         Signature: Alexsander Johnson DNP, FNP-C

## 2023-12-07 NOTE — ADDENDUM NOTE
Encounter addended by: Rufina Gallardo on: 12/7/2023 7:58 AM   Actions taken: SmartForm saved, Flowsheet accepted, Charge Capture section accepted

## 2023-12-12 ENCOUNTER — OFFICE VISIT (OUTPATIENT)
Dept: CARDIOLOGY | Facility: CLINIC | Age: 68
End: 2023-12-12
Payer: MEDICARE

## 2023-12-12 VITALS
SYSTOLIC BLOOD PRESSURE: 128 MMHG | HEART RATE: 88 BPM | HEIGHT: 66 IN | OXYGEN SATURATION: 90 % | DIASTOLIC BLOOD PRESSURE: 82 MMHG | WEIGHT: 81.19 LBS | BODY MASS INDEX: 13.05 KG/M2

## 2023-12-12 DIAGNOSIS — R00.2 PALPITATIONS: ICD-10-CM

## 2023-12-12 DIAGNOSIS — I25.2 HISTORY OF NON-ST ELEVATION MYOCARDIAL INFARCTION (NSTEMI): ICD-10-CM

## 2023-12-12 DIAGNOSIS — I25.10 CORONARY ARTERY DISEASE, UNSPECIFIED VESSEL OR LESION TYPE, UNSPECIFIED WHETHER ANGINA PRESENT, UNSPECIFIED WHETHER NATIVE OR TRANSPLANTED HEART: Primary | ICD-10-CM

## 2023-12-12 DIAGNOSIS — I25.10 CORONARY ARTERY DISEASE INVOLVING NATIVE CORONARY ARTERY OF NATIVE HEART WITHOUT ANGINA PECTORIS: ICD-10-CM

## 2023-12-12 DIAGNOSIS — I10 HYPERTENSION, UNSPECIFIED TYPE: ICD-10-CM

## 2023-12-12 DIAGNOSIS — I99.8 BLOOD PRESSURE INSTABILITY: ICD-10-CM

## 2023-12-12 PROCEDURE — 93010 EKG 12-LEAD: ICD-10-PCS | Mod: S$PBB,,, | Performed by: INTERNAL MEDICINE

## 2023-12-12 PROCEDURE — 93010 ELECTROCARDIOGRAM REPORT: CPT | Mod: S$PBB,,, | Performed by: INTERNAL MEDICINE

## 2023-12-12 PROCEDURE — 99214 OFFICE O/P EST MOD 30 MIN: CPT | Mod: PBBFAC | Performed by: NURSE PRACTITIONER

## 2023-12-12 PROCEDURE — 99214 OFFICE O/P EST MOD 30 MIN: CPT | Mod: S$PBB,,, | Performed by: NURSE PRACTITIONER

## 2023-12-12 PROCEDURE — 99214 PR OFFICE/OUTPT VISIT, EST, LEVL IV, 30-39 MIN: ICD-10-PCS | Mod: S$PBB,,, | Performed by: NURSE PRACTITIONER

## 2023-12-12 PROCEDURE — 93005 ELECTROCARDIOGRAM TRACING: CPT | Mod: PBBFAC | Performed by: INTERNAL MEDICINE

## 2023-12-12 RX ORDER — METOPROLOL SUCCINATE 50 MG/1
50 TABLET, EXTENDED RELEASE ORAL DAILY
Qty: 90 TABLET | Refills: 2 | Status: SHIPPED | OUTPATIENT
Start: 2023-12-12 | End: 2024-12-11

## 2023-12-13 NOTE — ASSESSMENT & PLAN NOTE
Lipid panel reviewed from 12/2/2023    Trig 57  HDL 81  LDL 82  Lipitor 40 mg po daily  Lipids followed by PCP

## 2023-12-13 NOTE — PROGRESS NOTES
PCP: Zoya Stanley FNP    Referring Provider:     Subjective:   Lorraine Brambila is a 68 y.o. female with hx of NSTEMI in 2019 with DEX prox LCx and residual disease of the RCA which did not appear to be r/t ischemia by Lexiscan 2019, who presents for follow up requested by her PCP for BP issues. Patient's bp since 2023 has been well controlled.   2023  123/68 and 125/83  2023   137/69  2023  128/82  The patient's  states it has been as low was 80s systolic and as high as 200 systolic on home bp readings.   She ws admitted 2023 for confusion and headache with no source found. She states that she has chronic pain and trouble sleeping. It is uncertain why she is having these issues. I see no cardiac source. I suspect that her lows are caused by dehydration and her highs by pain/anxiety. I reviewed her meds and I suspect her issue with confusion was an interaction between her Remeron and the benadryl in the Tylenol PM she was taking for sleep but again this can not be proven.    I believe given her recent bp she can tolerate an increase in her metoprolol from 25 mg po daily to 50 mg po daily. She should stay hydrated and PCP should treat any underlying issues with pain/anxiety. She would be a good candidate for the digital medicine program but unfortunately I don't think she is a candidate due to insurance.     2023 presents for routine follow up. She denies issues other than occasional, transient palpitations that has been ongoing for years.         Fhx:  Family History   Problem Relation Age of Onset    Heart attack Father     Heart disease Father       Shx:   Social History     Socioeconomic History    Marital status:    Tobacco Use    Smoking status: Former     Current packs/day: 0.00     Average packs/day: 1 pack/day for 50.0 years (50.0 ttl pk-yrs)     Types: Cigarettes     Start date: 1969     Quit date: 2019     Years since quittin.7    Smokeless  tobacco: Never   Substance and Sexual Activity    Alcohol use: Never    Drug use: Never    Sexual activity: Not Currently     Social Determinants of Health     Physical Activity: Sufficiently Active (5/16/2023)    Exercise Vital Sign     Days of Exercise per Week: 7 days     Minutes of Exercise per Session: 60 min        EKG 12/12/2023 RSR with sinus arrhythmia; HR 86 bpm; normal EKG  1/30/2023 RSR with HR 63 bpm; normal EKG  1/25/2022 RSR with HR 72 bpm; normal EKG    Lexiscan 5/21/2019  No definite findings of significant reversible ischemia can be detected by the study  Mild and fixed inferior wall defect wth no associated WMA suggestive of possible attenuation artifact appeared to be more likely than ischemia  Normal LV size and systolic function  LVEF 88%    ECHO 4/13/2019  Normal chamber size with possible normal LVSF with est LVEF 50%; poor quality no WMA could be assessed  Mild TR with est RVSP 36 mmHg  Pseudo-normalization of mitral inflow suggestive of LVDD  Increased LV filling pressure    [facility] 4/14/2019- Dr. Henderson  LM-short  LAD  with luminal irregularities; mLAD has 30% calcified plaque  LCx- mid LCx had 780% lesion with unstable plaque appearance   RCA is non-dominant and has a 95% prox lesion with remaining vessel diffusely diseased.  S/e KARYN mid Lcx with SURENDRA?I 3 flow post intervention      Lab Results   Component Value Date     12/02/2023    K 3.7 12/02/2023     12/02/2023    CO2 33 (H) 12/02/2023    BUN 14 12/02/2023    CREATININE 0.74 12/02/2023    CALCIUM 10.8 (H) 12/02/2023    ANIONGAP 11 12/02/2023       Lab Results   Component Value Date    CHOL 174 12/02/2023    CHOL 179 09/21/2023    CHOL 214 (H) 03/14/2023     Lab Results   Component Value Date    HDL 81 (H) 12/02/2023    HDL 87 (H) 09/21/2023    HDL 89 (H) 03/14/2023     Lab Results   Component Value Date    LDLCALC 82 12/02/2023    LDLCALC 76 09/21/2023    LDLCALC 105 03/14/2023     Lab Results   Component Value Date    TRIG 57  12/02/2023    TRIG 80 09/21/2023    TRIG 98 03/14/2023     Lab Results   Component Value Date    CHOLHDL 2.1 12/02/2023    CHOLHDL 2.1 09/21/2023    CHOLHDL 2.4 03/14/2023       Lab Results   Component Value Date    WBC 7.07 12/02/2023    HGB 12.6 12/02/2023    HCT 38.1 12/02/2023    .8 (H) 12/02/2023     12/02/2023           Current Outpatient Medications:     atorvastatin (LIPITOR) 40 MG tablet, Take 1 tablet (40 mg total) by mouth every evening., Disp: 90 tablet, Rfl: 2    clopidogreL (PLAVIX) 75 mg tablet, Take 1 tablet (75 mg total) by mouth once daily., Disp: 90 tablet, Rfl: 3    gabapentin (NEURONTIN) 100 MG capsule, Take 1 capsule (100 mg total) by mouth 3 (three) times daily., Disp: 90 capsule, Rfl: 0    HYDROcodone-acetaminophen (NORCO) 5-325 mg per tablet, Take 1 tablet by mouth every 6 (six) hours as needed for Pain., Disp: 28 tablet, Rfl: 0    metoprolol succinate (TOPROL-XL) 50 MG 24 hr tablet, Take 1 tablet (50 mg total) by mouth once daily., Disp: 90 tablet, Rfl: 2    mirtazapine (REMERON) 45 MG tablet, Take 1 tablet (45 mg total) by mouth every evening., Disp: 30 tablet, Rfl: 11    nitroGLYCERIN (NITROSTAT) 0.4 MG SL tablet, Place 1 tablet (0.4 mg total) under the tongue every 5 (five) minutes as needed for Chest pain., Disp: 90 tablet, Rfl: 3    ondansetron (ZOFRAN) 4 MG tablet, Take 4 mg by mouth every 8 (eight) hours as needed for Nausea., Disp: , Rfl:     pantoprazole (PROTONIX) 40 MG tablet, , Disp: , Rfl:     traMADoL (ULTRAM) 50 mg tablet, Take 1 tablet (50 mg total) by mouth every 6 (six) hours as needed for Pain., Disp: 120 tablet, Rfl: 0  Meds reviewed but not reconciled. She did not bring her meds.     Review of Systems   Respiratory:  Negative for shortness of breath.    Cardiovascular:  Positive for palpitations. Negative for chest pain, orthopnea, claudication, leg swelling and PND.   Neurological:  Negative for dizziness, loss of consciousness and weakness.  "        Objective:   /82 (BP Location: Right arm, Patient Position: Sitting)   Pulse 88   Ht 5' 6" (1.676 m)   Wt 36.8 kg (81 lb 3.2 oz)   SpO2 (!) 90%   BMI 13.11 kg/m²     Physical Exam  Vitals and nursing note reviewed.   Constitutional:       Appearance: Normal appearance. She is normal weight.   HENT:      Head: Normocephalic and atraumatic.   Neck:      Vascular: No carotid bruit.   Cardiovascular:      Rate and Rhythm: Normal rate and regular rhythm.      Pulses: Normal pulses.      Heart sounds: Normal heart sounds.   Pulmonary:      Effort: Pulmonary effort is normal.      Breath sounds: Normal breath sounds.   Abdominal:      Palpations: Abdomen is soft.   Musculoskeletal:      Cervical back: Neck supple.      Right lower leg: No edema.      Left lower leg: No edema.   Skin:     General: Skin is warm and dry.      Capillary Refill: Capillary refill takes less than 2 seconds.   Neurological:      Mental Status: She is alert.           Assessment:     1. Coronary artery disease, unspecified vessel or lesion type, unspecified whether angina present, unspecified whether native or transplanted heart  EKG 12-lead    metoprolol succinate (TOPROL-XL) 50 MG 24 hr tablet    EKG 12-lead      2. History of non-ST elevation myocardial infarction (NSTEMI)  Ambulatory referral/consult to Cardiology      3. Coronary artery disease involving native coronary artery of native heart without angina pectoris  Ambulatory referral/consult to Cardiology      4. Blood pressure instability  Ambulatory referral/consult to Cardiology    metoprolol succinate (TOPROL-XL) 50 MG 24 hr tablet      5. Palpitations  metoprolol succinate (TOPROL-XL) 50 MG 24 hr tablet      6. Hypertension, unspecified type              Plan:   Coronary artery disease  Asymptomatic  Continue Plavix/Lipitor    Hyperlipidemia  Lipid panel reviewed from 12/2/2023    Trig 57  HDL 81  LDL 82  Lipitor 40 mg po daily  Lipids followed by " PCP    Hypertension  PCP requested follow up for labile BP. Patient's bp since 12/2/2023 has been well controlled.   12/2/2023  123/68 and 125/83  12/5/2023   137/69  12/12/2023  128/82  The patient's  states it has been as low was 80s systolic and as high as 200 systolic on home bp readings.   She ws admitted 12/2/2023 for confusion and headache with no source found. She states that she has chronic pain and trouble sleeping. It is uncertain why she is having these issues. I see no cardiac source. I suspect that her lows are caused by dehydration and her highs by pain/anxiety. I reviewed her meds and I suspect her issue with confusion was an interaction between her Remeron and the benadryl in the Tylenol PM she was taking for sleep but again this can not be proven.    I believe given her recent bp she can tolerate an increase in her metoprolol from 25 mg po daily to 50 mg po daily. She should stay hydrated and PCP should treat any underlying issues with pain/anxiety. She would be a good candidate for the digital medicine program but unfortunately I don't think she is a candidate due to insurance.     History of non-ST elevation myocardial infarction (NSTEMI)  4/2019    RTC: 6 months

## 2023-12-13 NOTE — ASSESSMENT & PLAN NOTE
PCP requested follow up for labile BP. Patient's bp since 12/2/2023 has been well controlled.   12/2/2023  123/68 and 125/83  12/5/2023   137/69  12/12/2023  128/82  The patient's  states it has been as low was 80s systolic and as high as 200 systolic on home bp readings.   She ws admitted 12/2/2023 for confusion and headache with no source found. She states that she has chronic pain and trouble sleeping. It is uncertain why she is having these issues. I see no cardiac source. I suspect that her lows are caused by dehydration and her highs by pain/anxiety. I reviewed her meds and I suspect her issue with confusion was an interaction between her Remeron and the benadryl in the Tylenol PM she was taking for sleep but again this can not be proven.    I believe given her recent bp she can tolerate an increase in her metoprolol from 25 mg po daily to 50 mg po daily. She should stay hydrated and PCP should treat any underlying issues with pain/anxiety. She would be a good candidate for the digital medicine program but unfortunately I don't think she is a candidate due to insurance.

## 2023-12-28 ENCOUNTER — CLINICAL SUPPORT (OUTPATIENT)
Dept: REHABILITATION | Facility: HOSPITAL | Age: 68
End: 2023-12-28
Payer: MEDICARE

## 2023-12-28 DIAGNOSIS — M25.512 ACUTE PAIN OF LEFT SHOULDER: Primary | ICD-10-CM

## 2023-12-28 DIAGNOSIS — S42.212D CLOSED DISPLACED FRACTURE OF SURGICAL NECK OF LEFT HUMERUS WITH ROUTINE HEALING, UNSPECIFIED FRACTURE MORPHOLOGY, SUBSEQUENT ENCOUNTER: ICD-10-CM

## 2023-12-28 PROBLEM — M25.511 ACUTE PAIN OF RIGHT SHOULDER: Status: ACTIVE | Noted: 2023-12-28

## 2023-12-28 PROCEDURE — 97110 THERAPEUTIC EXERCISES: CPT

## 2023-12-28 PROCEDURE — 97014 ELECTRIC STIMULATION THERAPY: CPT

## 2023-12-28 PROCEDURE — 97161 PT EVAL LOW COMPLEX 20 MIN: CPT

## 2023-12-28 NOTE — PLAN OF CARE
"[facility]   Physical Therapy Initial Evaluation      Name: Lorraine Brambila  Clinic Number: [id number]    Therapy Diagnosis:   Encounter Diagnoses   Name Primary?    Closed displaced fracture of surgical neck of left humerus with routine healing, unspecified fracture morphology, subsequent encounter     Acute pain of right shoulder Yes        Physician: Abdirahman Gill MD    Physician Orders: PT Eval and Treat   Medical Diagnosis from Referral: displaced fracture of L humerus  Evaluation Date: 12/28/2023  Authorization Period Expiration: 11/26/2024  Plan of Care Expiration: 1/26/2024  Progress Note Due: 1/26/2024  Date of Surgery: NA   Visit # / Visits authorized: 1/ 12   FOTO: to be completed on visit 6     Precautions: Standard and Fall     Time In: 13:56   Time Out: 14:38  Total Billable Time: 42 minutes    Subjective   Date of onset: 10/24/2023    History of current condition - LORRAINE reports: that she tripped over her cat and fell landing on her L shoulder in October of this year. She did not have surgery, but she wore a sling for 4 weeks following injury. Patient presents to PT today for conservative treatment.     Falls: Patient fell in October resulting in this injury     Imaging: X-rays at last MD visit show routine healing    Prior Therapy: None   Social History:  lives with their spouse  Occupation: Retired   Prior Level of Function: Independent   Current Level of Function: requires assist for higher level ADLs such as sweeping and vacuuming     Pain:  Current 3/10, worst 5/10, best 3/10   Location: left shoulder    Description: Aching, Throbbing, and spasms  Aggravating Factors: Night Time  Easing Factors: pain medication and heating pad    Patients goals: "I need to be able to use it more."     Medical History:   Past Medical History:   Diagnosis Date    COPD (chronic obstructive pulmonary disease)     Coronary artery disease     Fibromyalgia     Hyperlipidemia     " Hypertension        Surgical History:   Lorraine Brambila  has a past surgical history that includes Cardiac catheterization; Hernia repair; Hysterectomy; and Oophorectomy.    Medications:   Lorraine has a current medication list which includes the following prescription(s): atorvastatin, clopidogrel, gabapentin, hydrocodone-acetaminophen, metoprolol succinate, mirtazapine, nitroglycerin, ondansetron, pantoprazole, and tramadol.    Allergies:   Review of patient's allergies indicates:  No Known Allergies     Objective    Posture: rounded shoulders Yes, forward head Yes, increased kyphotic curve Yes, decreased lordotic curve Yes  Clear cervical spine: Spurling's test negative    Active Range of motion  Motion Right  Left    Shoulder flexion WITHIN FUNCTIONAL LIMITS 83 degrees    Shoulder extension WITHIN FUNCTIONAL LIMITS N/A   Shoulder abduction WITHIN FUNCTIONAL LIMITS 42 degrees    Shoulder internal rotation WITHIN FUNCTIONAL LIMITS Greater Trochanter functional reach    Shoulder external rotation WITHIN FUNCTIONAL LIMITS C1 functional reach    Elbow flexion WITHIN FUNCTIONAL LIMITS WITHIN FUNCTIONAL LIMITS   Elbow extension WITHIN FUNCTIONAL LIMITS WITHIN FUNCTIONAL LIMITS   Wrist flexion WITHIN FUNCTIONAL LIMITS WITHIN FUNCTIONAL LIMITS   Wrist extension WITHIN FUNCTIONAL LIMITS WITHIN FUNCTIONAL LIMITS   Ulnar deviation WITHIN FUNCTIONAL LIMITS WITHIN FUNCTIONAL LIMITS   Radial deviation WITHIN FUNCTIONAL LIMITS WITHIN FUNCTIONAL LIMITS     Passive range of motion: L shoulder Passive range of motion: Flexion 91 degrees, 41 degrees IR and 23 degrees ER     MANUAL MUSCLE TEST  Muscle Right  Left    Shoulder flexion MMT strength: 4/5 MMT strength: 2-/5   Shoulder extension MMT strength: 4/5 MMT strength: 2-/5   Shoulder abduction MMT strength: 4/5 MMT strength: 2-/5   Shoulder internal rotation MMT strength: 4/5 MMT strength: 2-/5   Shoulder external rotation MMT strength: 4/5 MMT strength: 2-/5   Elbow flexion MMT  "strength: 4/5 MMT strength: 3/5   Elbow extension MMT strength: 4/5 MMT strength: 3/5   Wrist flexion MMT strength: 4/5 MMT strength: 4/5   Wrist extension MMT strength: 4/5 MMT strength: 4/5   Ulnar deviation MMT strength: 4/5 MMT strength: 4/5   Radial deviation MMT strength: 4/5 MMT strength: 4/5     Functional ability:  Dressing: AMB PT LEVEL OF ASSISTANCE: independent  Driving: AMB PT LEVEL OF ASSISTANCE: independent  Overhead activity: AMB PT LEVEL OF ASSISTANCE: unable with L UE   Work/hobbies: AMB PT LEVEL OF ASSISTANCE: unable with L UE       Clinical test:  N/A     Intake Outcome Measure for FOTO Survey    Therapist reviewed FOTO scores for Lorraine Brambila on 12/28/2023.   FOTO report - see Media section or FOTO account episode details.    Intake Score: 41%         Treatment     Total Treatment time (time-based codes) separate from Evaluation: 30 minutes     LORRAINE received the treatments listed below:      therapeutic exercises to develop strength, endurance, ROM, flexibility, and posture for 20 minutes including: see flowsheet below     Therapeutic-Exercise  Reps        Pulleys (flexion and ABD) 3 mins each    Scapular retractions  20 x 3"    Ball Rolls flexion  5 x 10"    Table Slides (flexion and scaption)  5 x 10" Each    Wand flexion stretch  5 x 10"    Wand ER stretch  5 x 10"                      supervised modalities after being cleared for contradictions: Biphasic ESTIM:  Lorraine received electrical stimulation for pain to the L upper trapezius and L tricep to decrease tissue irritation and spasms. Pt received modulated mode at a rate of 2 pps for 10 minutes. Lorraine tolerated treatment well without any adverse effects.     Patient Education and Home Exercises     Education provided:   - eval findings, plan of care, and home exercise program     Written Home Exercises Provided: yes. Exercises were reviewed and LORRAINE was able to demonstrate them prior to the end of the session.  LORRAINE demonstrated " good  understanding of the education provided. See EMR under Patient Instructions for exercises provided during therapy sessions.    Assessment     Lorraine is a 68 y.o. female referred to outpatient Physical Therapy with a medical diagnosis of displaced L humerus fracture. Patient presents with L shoulder pain, decreased L shoulder ROM, and decreased L UE muscle strength and motor control. Patient's impairments are currently limiting her  functional use of L UE to complete ADLs.     Patient requires maximal cueing throughout therapy tasks for decreased guarding, decreased compensations, and improved posture and form. Patient also required cueing to decrease speed and increase hold times to increase effectiveness of therapy tasks. Patient demonstrated maximal guarding during attempted Passive range of motion of L shoulder. No adverse effects noted to PT.     Patient prognosis is Good.   Patient will benefit from skilled outpatient Physical Therapy to address the deficits stated above and in the chart below, provide patient /family education, and to maximize patientt's level of independence.     Plan of care discussed with patient: Yes  Patient's spiritual, cultural and educational needs considered and patient is agreeable to the plan of care and goals as stated below:     Anticipated Barriers for therapy: compliance with Home exercise program, guarding    Medical Necessity is demonstrated by the following  History  Co-morbidities and personal factors that may impact the plan of care [x] LOW: no personal factors / co-morbidities  [] MODERATE: 1-2 personal factors / co-morbidities  [] HIGH: 3+ personal factors / co-morbidities    Moderate / High Support Documentation:   Co-morbidities affecting plan of care: NA    Personal Factors:   no deficits     Examination  Body Structures and Functions, activity limitations and participation restrictions that may impact the plan of care [x] LOW: addressing 1-2 elements  []  MODERATE: 3+ elements  [] HIGH: 4+ elements (please support below)    Moderate / High Support Documentation: NA     Clinical Presentation [x] LOW: stable  [] MODERATE: Evolving  [] HIGH: Unstable     Decision Making/ Complexity Score: low       Goals:  Short Term Goals: 2 weeks   Patient will independently complete Home exercise program with correct form.   Patient will report decreased L shoulder pain at rest to less than or equal to 2/10 for improved quality of life.     Long Term Goals: 4 weeks   Pt will increase L shoulder flexion to 120 degrees, external rotation to C4, and internal rotation to L2 for independent dressing  Pt will increase L upper extremity to 4/5 to allow patient to perform activities of daily living independently  Pt will report decrease in L shoulder pain to 3/10 at worst to improve quality of life  Patient will have increased FOTO score to greater than or equal to 55% indicating increased function.     Plan   Plan of care Certification: 12/28/2023 to 1/26/2024.    Outpatient Physical Therapy 3 times weekly for 4 weeks to include the following interventions: Electrical Stimulation unattended, Manual Therapy, Moist Heat/ Ice, Patient Education, Therapeutic Activities, Therapeutic Exercise, and Ultrasound.     Mia Wheatley, PT, DPT     Physician's Signature: _________________________________________ Date: ________________

## 2023-12-29 NOTE — ADDENDUM NOTE
Encounter addended by: Saadia Royal on: 12/29/2023 4:34 PM   Actions taken: Charge Capture section accepted

## 2024-01-03 ENCOUNTER — CLINICAL SUPPORT (OUTPATIENT)
Dept: REHABILITATION | Facility: HOSPITAL | Age: 69
End: 2024-01-03
Payer: MEDICARE

## 2024-01-03 DIAGNOSIS — M25.512 ACUTE PAIN OF LEFT SHOULDER: Primary | ICD-10-CM

## 2024-01-03 PROCEDURE — 97014 ELECTRIC STIMULATION THERAPY: CPT | Mod: CQ

## 2024-01-03 PROCEDURE — 97110 THERAPEUTIC EXERCISES: CPT | Mod: CQ

## 2024-01-03 NOTE — PROGRESS NOTES
"[facility]   Physical Therapy Treatment Note     Name: Lorraine Brambila  Clinic Number: [id number]    Therapy Diagnosis: No diagnosis found.  Physician: Abdirahman Gill MD    Visit Date: 1/3/2024    Physician Orders: PT Eval and Treat   Medical Diagnosis from Referral: displaced fracture of L humerus  Evaluation Date: 12/28/2023  Authorization Period Expiration: 11/26/2024  Plan of Care Expiration: 1/26/2024  Progress Note Due: 1/26/2024  Date of Surgery: NA   Visit # / Visits authorized: 2/12   FOTO: to be completed on visit 6      Precautions: Standard and Fall      Time In: 11:00   Time Out: 11:41  Total Billable Time: 41 minutes    PTA Visit #: 1/5       SUBJECTIVE   Pt reports: "This cold weather makes my arm hurt more".    She was compliant with home exercise program.  Response to previous treatment: N/A  Functional change: Too early in Plan of Care     Pain: 5/10  Location: left shoulder      OBJECTIVE     Objective Measures updated at progress report unless specified.     Treatment     LORRAINE received the treatments listed below:      therapeutic exercises to develop strength, endurance, ROM, flexibility, and posture for 31 minutes including: see flowsheet below      Therapeutic-Exercise  Reps          Pulleys (flexion and ABD) 3 mins each    Scapular retractions  20 x 3"    Ball Rolls flexion  5 x 10"    Table Slides (flexion and scaption)  5 x 10" Each    Wand flexion stretch  5 x 10"    Wand ER stretch  5 x 10"                               supervised modalities after being cleared for contradictions: Biphasic ESTIM:  Lorraine received electrical stimulation for pain to the L upper trapezius and L tricep to decrease tissue irritation and spasms. Pt received modulated mode at a rate of 2 pps for 10 minutes. Lorraine tolerated treatment well without any adverse effects.      MHP applied for 10 minutes with estim    Patient Education and Home Exercises     Home Exercises Provided " and Patient Education Provided     Education provided:   - Home exercise program, Plan of Care, Delayed onset muscle soreness     Written Home Exercises Provided: Patient instructed to cont prior HEP. Exercises were reviewed and LORRAINE was able to demonstrate them prior to the end of the session.  LORRAINE demonstrated good  understanding of the education provided. See EMR under Patient Instructions for exercises provided during therapy sessions    ASSESSMENT   Lorraine is a 68 y.o. female referred to outpatient Physical Therapy with a medical diagnosis of displaced L humerus fracture. Patient presents with L shoulder pain, decreased L shoulder ROM, and decreased L UE muscle strength and motor control. Patient's impairments are currently limiting her  functional use of L UE to complete ADLs. Pt requires maximal cueing throughout therapy tasks for decreased guarding, decreased compensations, and improved posture and form. Pt requires increased tactile cueing to maintain proper form with stretches. No adverse effects noted.      Patient prognosis is Good.   Patient will benefit from skilled outpatient Physical Therapy to address the deficits stated above and in the chart below, provide patient /family education, and to maximize patientt's level of independence.      Plan of care discussed with patient: Yes  Patient's spiritual, cultural and educational needs considered and patient is agreeable to the plan of care and goals as stated below:      Anticipated Barriers for therapy: compliance with Home exercise program, guarding  Goals:  Short Term Goals: 2 weeks   Patient will independently complete Home exercise program with correct form.   Patient will report decreased L shoulder pain at rest to less than or equal to 2/10 for improved quality of life.      Long Term Goals: 4 weeks   Pt will increase L shoulder flexion to 120 degrees, external rotation to C4, and internal rotation to L2 for independent dressing  Pt will  increase L upper extremity to 4/5 to allow patient to perform activities of daily living independently  Pt will report decrease in L shoulder pain to 3/10 at worst to improve quality of life  Patient will have increased FOTO score to greater than or equal to 55% indicating increased function.    PLAN   Continue POC per PT orders to progress patient toward rehab goals.   YOVANNY Sanchez  1/3/2024

## 2024-01-05 ENCOUNTER — CLINICAL SUPPORT (OUTPATIENT)
Dept: REHABILITATION | Facility: HOSPITAL | Age: 69
End: 2024-01-05
Payer: MEDICARE

## 2024-01-05 DIAGNOSIS — M25.512 ACUTE PAIN OF LEFT SHOULDER: Primary | ICD-10-CM

## 2024-01-05 PROCEDURE — 97014 ELECTRIC STIMULATION THERAPY: CPT | Mod: CQ

## 2024-01-05 PROCEDURE — 97110 THERAPEUTIC EXERCISES: CPT | Mod: CQ

## 2024-01-05 NOTE — PROGRESS NOTES
"[facility]   Physical Therapy Treatment Note     Name: Lorraine Brambila  Clinic Number: [id number]    Therapy Diagnosis: No diagnosis found.  Physician: Abdirahman Glil MD    Visit Date: 1/5/2024    Physician Orders: PT Eval and Treat   Medical Diagnosis from Referral: displaced fracture of L humerus  Evaluation Date: 12/28/2023  Authorization Period Expiration: 11/26/2024  Plan of Care Expiration: 1/26/2024  Progress Note Due: 1/26/2024  Date of Surgery: NA   Visit # / Visits authorized: 3/12   FOTO: to be completed on visit 6      Precautions: Standard and Fall      Time In: 11:07   Time Out: 11:49  Total Billable Time: 42 minutes    PTA Visit #: 2/5       SUBJECTIVE   Pt reports: "My arm always hurts".    She was compliant with home exercise program.  Response to previous treatment: N/A  Functional change: Too early in Plan of Care     Pain: 5/10  Location: left shoulder      OBJECTIVE     Objective Measures updated at progress report unless specified.     Treatment     LORRAINE received the treatments listed below:      therapeutic exercises to develop strength, endurance, ROM, flexibility, and posture for 32 minutes including: see flowsheet below      Therapeutic-Exercise  Reps          Pulleys (flexion and ABD) 3 mins each    Finger Ladder    Scapular retractions  20 x 3"    Ball Rolls flexion  5 x 10"    Table Slides (flexion and scaption)  5 x 10" Each    Wand flexion stretch  5 x 10"    Wand ER stretch  5 x 10"                               supervised modalities after being cleared for contradictions: Biphasic ESTIM:  Lorraine received electrical stimulation for pain to the L upper trapezius and L tricep to decrease tissue irritation and spasms. Pt received modulated mode at a rate of 2 pps for 10 minutes. Lorraine tolerated treatment well without any adverse effects.      MHP applied for 10 minutes with estim    Patient Education and Home Exercises     Home Exercises Provided and " Patient Education Provided     Education provided:   - Home exercise program, Plan of Care, Delayed onset muscle soreness     Written Home Exercises Provided: Patient instructed to cont prior HEP. Exercises were reviewed and LORRAINE was able to demonstrate them prior to the end of the session.  LORRAINE demonstrated good  understanding of the education provided. See EMR under Patient Instructions for exercises provided during therapy sessions    ASSESSMENT   Lorraine is a 68 y.o. female referred to outpatient Physical Therapy with a medical diagnosis of displaced L humerus fracture. Patient presents with L shoulder pain, decreased L shoulder ROM, and decreased L UE muscle strength and motor control. Patient's impairments are currently limiting her  functional use of L UE to complete ADLs. Pt requires maximal cueing throughout therapy tasks for decreased guarding, decreased compensations, and improved posture and form. Pt continues to require increased tactile cueing to maintain proper form with stretches.Modalities applied to decrease pain and soreness. No adverse effects noted.      Patient prognosis is Good.   Patient will benefit from skilled outpatient Physical Therapy to address the deficits stated above and in the chart below, provide patient /family education, and to maximize patientt's level of independence.      Plan of care discussed with patient: Yes  Patient's spiritual, cultural and educational needs considered and patient is agreeable to the plan of care and goals as stated below:      Anticipated Barriers for therapy: compliance with Home exercise program, guarding  Goals:  Short Term Goals: 2 weeks   Patient will independently complete Home exercise program with correct form.   Patient will report decreased L shoulder pain at rest to less than or equal to 2/10 for improved quality of life.      Long Term Goals: 4 weeks   Pt will increase L shoulder flexion to 120 degrees, external rotation to C4, and  internal rotation to L2 for independent dressing  Pt will increase L upper extremity to 4/5 to allow patient to perform activities of daily living independently  Pt will report decrease in L shoulder pain to 3/10 at worst to improve quality of life  Patient will have increased FOTO score to greater than or equal to 55% indicating increased function.    PLAN   Continue POC per PT orders to progress patient toward rehab goals.   YOVANNY Sanchez  1/5/2024

## 2024-01-08 ENCOUNTER — CLINICAL SUPPORT (OUTPATIENT)
Dept: REHABILITATION | Facility: HOSPITAL | Age: 69
End: 2024-01-08
Payer: MEDICARE

## 2024-01-08 ENCOUNTER — TELEPHONE (OUTPATIENT)
Dept: PAIN MEDICINE | Facility: CLINIC | Age: 69
End: 2024-01-08
Payer: MEDICARE

## 2024-01-08 DIAGNOSIS — M25.512 ACUTE PAIN OF LEFT SHOULDER: Primary | ICD-10-CM

## 2024-01-08 PROCEDURE — 97110 THERAPEUTIC EXERCISES: CPT | Mod: CQ

## 2024-01-08 NOTE — PROGRESS NOTES
"[facility]   Physical Therapy Treatment Note     Name: Lorraine Brambila  Clinic Number: [id number]    Therapy Diagnosis: No diagnosis found.  Physician: Abdirahman Gill MD    Visit Date: 1/8/2024    Physician Orders: PT Eval and Treat   Medical Diagnosis from Referral: displaced fracture of L humerus  Evaluation Date: 12/28/2023  Authorization Period Expiration: 11/26/2024  Plan of Care Expiration: 1/26/2024  Progress Note Due: 1/26/2024  Date of Surgery: NA   Visit # / Visits authorized: 4/12   FOTO: to be completed on visit 6      Precautions: Standard and Fall      Time In: 11:05  Time Out: 11:40  Total Billable Time: 35 minutes     PTA Visit #: 2/5       SUBJECTIVE   Pt reports: "My arm is sore".     She was compliant with home exercise program.  Response to previous treatment: N/A  Functional change: Too early in Plan of Care     Pain: 5/10  Location: left shoulder      OBJECTIVE     Objective Measures updated at progress report unless specified.     Treatment     LORRAINE received the treatments listed below:      therapeutic exercises to develop strength, endurance, ROM, flexibility, and posture for 35 minutes including: see flowsheet below      Therapeutic-Exercise  Reps          Pulleys (flexion and ABD) 4 mins each *   Finger Ladder 5 x 5" flexion *    Scapular retractions  30 x 3" *   Ball Rolls flexion  10 x 10" *   Table Slides (flexion and scaption)  10 x 10" Each *   Wand flexion stretch  10 x 10" *   Wand ER stretch  5 x 10"                               Not completed: supervised modalities after being cleared for contradictions: Biphasic ESTIM:  Lorraine received electrical stimulation for pain to the L upper trapezius and L tricep to decrease tissue irritation and spasms. Pt received modulated mode at a rate of 2 pps for 10 minutes. Lorraine tolerated treatment well without any adverse effects.      MHP applied for 10 minutes with estim    Patient Education and Home " "Exercises     Home Exercises Provided and Patient Education Provided     Education provided:   - Home exercise program, Plan of Care, Delayed onset muscle soreness     Written Home Exercises Provided: Patient instructed to cont prior HEP. Exercises were reviewed and LORRAINE was able to demonstrate them prior to the end of the session.  LORRAINE demonstrated good  understanding of the education provided. See EMR under Patient Instructions for exercises provided during therapy sessions    ASSESSMENT   Lorraine is a 68 y.o. female referred to outpatient Physical Therapy with a medical diagnosis of displaced L humerus fracture. Patient presents with L shoulder pain, decreased L shoulder ROM, and decreased L UE muscle strength and motor control. Patient's impairments are currently limiting her  functional use of L UE to complete ADLs. Pt requires moderate  cueing throughout therapy tasks for decreased guarding, decreased compensations, and improved posture and form. Pt continues to require increased tactile cueing to maintain proper form with stretches. Patient declined modalities at end of physical therapy treatment session, and states "It doesn't seem to hurt as bad as before".  No adverse effects noted or reported.           Patient prognosis is Good.   Patient will benefit from skilled outpatient Physical Therapy to address the deficits stated above and in the chart below, provide patient /family education, and to maximize patientt's level of independence.      Plan of care discussed with patient: Yes  Patient's spiritual, cultural and educational needs considered and patient is agreeable to the plan of care and goals as stated below:      Anticipated Barriers for therapy: compliance with Home exercise program, guarding  Goals:  Short Term Goals: 2 weeks   Patient will independently complete Home exercise program with correct form.   Patient will report decreased L shoulder pain at rest to less than or equal to 2/10 for " improved quality of life.      Long Term Goals: 4 weeks   Pt will increase L shoulder flexion to 120 degrees, external rotation to C4, and internal rotation to L2 for independent dressing  Pt will increase L upper extremity to 4/5 to allow patient to perform activities of daily living independently  Pt will report decrease in L shoulder pain to 3/10 at worst to improve quality of life  Patient will have increased FOTO score to greater than or equal to 55% indicating increased function.    PLAN   Continue POC per PT orders to progress patient toward rehab goals.   YOVANNY Rahman   1/8/2024

## 2024-01-08 NOTE — TELEPHONE ENCOUNTER
----- Message from Alana Lopez sent at 1/8/2024  2:56 PM CST -----  Regarding: rx  Pt is requesting a refill on Tramadol please call pt back at [phone]    I have attempted to return patients call to given number with no answer. Voicemail is left. Valdez LANZA 01/08/24 @ 15:21

## 2024-01-09 RX ORDER — CLOPIDOGREL BISULFATE 75 MG/1
75 TABLET ORAL DAILY
Qty: 30 TABLET | Refills: 0 | Status: SHIPPED | OUTPATIENT
Start: 2024-01-09

## 2024-01-09 NOTE — PROGRESS NOTES
"She Disclaimer: This note has been generated using voice-recognition software. There may be typographical errors that have been missed during proof-reading        Patient ID: Lorraine Brambila is a 68 y.o. female.      Chief Complaint: Shoulder Pain (Left shoulder pain, recent left shoulder fracture. Right hip pain)      68-year-old female returns for re-evaluation bilateral hip pain.  She describes a "popping" sensation and the pain is exacerbation with walking most activities.  She tripped over her cat, October 23, 2023 and sustained fracture of the left shoulder.  She is currently involved in physical therapy.  She will consider bilateral hip injections at the next office visit.  X-rays revealed mild-to-moderate degenerative changes of the hips and the lumbar spine.  She remains on Plavix for coronary artery disease in his unable to tolerate NSAIDs.  She does not follow with rheumatology and  has a history of osteoporosis.  She returns today for medication refill.                    Pain Assessment  Pain Assessment: 0-10  Pain Score:   5  Pain Location: Shoulder  Pain Orientation: Left  Pain Descriptors: Dull, Aching, Constant  Pain Frequency: Continuous  Pain Onset: Gradual  Clinical Progression: Gradually improving  Aggravating Factors: Standing, Walking  Pain Intervention(s): Other (Comment), Heat applied, Medication (See eMAR) (Current physical therapy)      A's of Opioid Risk Assessment  Activity:Patient has difficulty performing ADL.   Analgesia:Patients pain is controlled by current medication.   Adverse Effects: Patient denies constipation or sedation.  Aberrant Behavior:  reviewed with no aberrant drug seeking/taking behavior.      Patient denies any suicidal or homicidal ideations    Physical Therapy/Home Exercise: no          Review of Systems   Constitutional: Negative.    HENT: Negative.     Eyes: Negative.    Respiratory: Negative.     Cardiovascular: Negative.    Gastrointestinal: Negative.  "   Endocrine: Negative.    Genitourinary: Negative.    Musculoskeletal:  Positive for arthralgias (Bilateral hips and left shoulder), back pain and gait problem.   Integumentary:  Negative.   Hematological: Negative.    Psychiatric/Behavioral: Negative.               Past Medical History:   Diagnosis Date    COPD (chronic obstructive pulmonary disease)     Coronary artery disease     Fibromyalgia     Hyperlipidemia     Hypertension      Past Surgical History:   Procedure Laterality Date    CARDIAC CATHETERIZATION      HERNIA REPAIR      HYSTERECTOMY      OOPHORECTOMY       Social History     Socioeconomic History    Marital status:    Tobacco Use    Smoking status: Former     Current packs/day: 0.00     Average packs/day: 1 pack/day for 50.0 years (50.0 ttl pk-yrs)     Types: Cigarettes     Start date: 1969     Quit date: 2019     Years since quittin.7    Smokeless tobacco: Never   Substance and Sexual Activity    Alcohol use: Never    Drug use: Never    Sexual activity: Not Currently     Social Determinants of Health     Physical Activity: Sufficiently Active (2023)    Exercise Vital Sign     Days of Exercise per Week: 7 days     Minutes of Exercise per Session: 60 min     Family History   Problem Relation Age of Onset    Heart attack Father     Heart disease Father      Review of patient's allergies indicates:  No Known Allergies  has a current medication list which includes the following prescription(s): atorvastatin, clopidogrel, gabapentin, metoprolol succinate, mirtazapine, nitroglycerin, ondansetron, pantoprazole, hydrocodone-acetaminophen, and tramadol.      Objective:  Vitals:    01/10/24 0809   BP: (!) 169/83   Pulse: 76   Resp: 18        Physical Exam  Vitals and nursing note reviewed.   Constitutional:       General: She is not in acute distress.     Appearance: Normal appearance. She is not ill-appearing, toxic-appearing or diaphoretic.   HENT:      Head: Normocephalic and  atraumatic.      Nose: Nose normal.      Mouth/Throat:      Mouth: Mucous membranes are moist.   Eyes:      Extraocular Movements: Extraocular movements intact.      Pupils: Pupils are equal, round, and reactive to light.   Cardiovascular:      Rate and Rhythm: Normal rate and regular rhythm.      Heart sounds: Normal heart sounds.   Pulmonary:      Effort: Pulmonary effort is normal. No respiratory distress.      Breath sounds: Normal breath sounds. No stridor. No wheezing or rhonchi.   Abdominal:      General: Bowel sounds are normal.      Palpations: Abdomen is soft.   Musculoskeletal:         General: No swelling or deformity.      Left shoulder: Tenderness and bony tenderness present. Decreased range of motion.      Cervical back: Normal and normal range of motion. No spasms or tenderness. No pain with movement. Normal range of motion.      Thoracic back: Normal.      Lumbar back: Tenderness present. No spasms or bony tenderness. Normal range of motion. Negative right straight leg raise test and negative left straight leg raise test. No scoliosis.      Right hip: Tenderness and bony tenderness present. Decreased range of motion.      Left hip: Tenderness and bony tenderness present. Decreased range of motion.      Right lower leg: Tenderness and bony tenderness present. No edema.      Left lower leg: Tenderness and bony tenderness present. No edema.   Skin:     General: Skin is warm.   Neurological:      General: No focal deficit present.      Mental Status: She is alert and oriented to person, place, and time. Mental status is at baseline.      Cranial Nerves: No cranial nerve deficit.      Sensory: Sensation is intact. No sensory deficit.      Motor: No weakness.      Coordination: Coordination normal.      Gait: Gait normal.      Deep Tendon Reflexes: Reflexes are normal and symmetric.   Psychiatric:         Mood and Affect: Mood normal.         Behavior: Behavior normal.           Assessment:      1.  Encounter for long-term (current) use of other medications    2. Neuropathy    3. Closed displaced fracture of surgical neck of left humerus with routine healing, unspecified fracture morphology, subsequent encounter          Plan:  1. reviewed  2.Addiction, Dependency, Tolerance, Opioid abuse-misuse, Death, Diversion Discussed. Overdose reversal drug Naloxone discussed.Patient is prescribed opiates for chronic nonmalignant pain pathology.  Patient is receiving opiates which require greater than a 72 hour supply of therapy.  Patient was educated on potential dependency associated with long-term opioid use as well as decreasing efficacy with prolonged use.  Patient was advised of risks, benefits and side effects and how to utilize each medication.  Patient was also informed that any deviation from therapy protocol will  lead to discontinuation of opiates.  It is reasonable to prescribe opioid analgesics for patient based on positive response to opioid medications, lack of side effects and  limited aberrant behavior.    3.Refill/Continue medications for pain control and function       Requested Prescriptions     Signed Prescriptions Disp Refills    traMADoL (ULTRAM) 50 mg tablet 120 tablet 1     Sig: Take 1 tablet (50 mg total) by mouth every 6 (six) hours as needed for Pain.     4.Urine drug screen point of care obtained and consistent with prescribed medications and medication refill date.  Drug screen is to monitor compliance with prescribed opiates and to ensure that there was no misuse/abuse of other non-prescribed opioids or illicit drugs.  From this information I will determine if patient is suitable for opioid therapy    Orders Placed This Encounter   Procedures    POCT Urine Drug Screen Presump     Interpretive Information:     Negative:  No drug detected at the cut off level.   Positive:  This result represents presumptive positive for the   tested drug, other substances may yield a positive response  other   than the analyte of interest. This result should be utilized for   diagnostic purpose only. Confirmation testing will be performed upon physician request only.         5.Patient defers nerve block injections, physical therapy or surgical consultation  6.Follow with SAHIL Aquino in 3 months for re-evaluation and medication refill         report:  Reviewed and consistent with medication use as prescribed.      The total time spent for evaluation and management on 01/10/2024 including reviewing separately obtained history, performing a medically appropriate exam and evaluation, documenting clinical information in the health record, independently interpreting results and communicating them to the patient/family/caregiver, and ordering medications/tests/procedures was between 15-29 minutes.    The above plan and management options were discussed at length with patient. Patient is in agreement with the above and verbalized understanding. It will be communicated with the referring physician via electronic record, fax, or mail.

## 2024-01-10 ENCOUNTER — OFFICE VISIT (OUTPATIENT)
Dept: PAIN MEDICINE | Facility: CLINIC | Age: 69
End: 2024-01-10
Payer: MEDICARE

## 2024-01-10 VITALS
BODY MASS INDEX: 13.02 KG/M2 | RESPIRATION RATE: 18 BRPM | DIASTOLIC BLOOD PRESSURE: 83 MMHG | HEIGHT: 66 IN | SYSTOLIC BLOOD PRESSURE: 169 MMHG | WEIGHT: 81 LBS | HEART RATE: 76 BPM

## 2024-01-10 DIAGNOSIS — Z79.899 ENCOUNTER FOR LONG-TERM (CURRENT) USE OF OTHER MEDICATIONS: Primary | ICD-10-CM

## 2024-01-10 DIAGNOSIS — S42.212D CLOSED DISPLACED FRACTURE OF SURGICAL NECK OF LEFT HUMERUS WITH ROUTINE HEALING, UNSPECIFIED FRACTURE MORPHOLOGY, SUBSEQUENT ENCOUNTER: ICD-10-CM

## 2024-01-10 DIAGNOSIS — G62.9 NEUROPATHY: Chronic | ICD-10-CM

## 2024-01-10 LAB

## 2024-01-10 PROCEDURE — 99213 OFFICE O/P EST LOW 20 MIN: CPT | Mod: S$PBB,,, | Performed by: PAIN MEDICINE

## 2024-01-10 PROCEDURE — 99214 OFFICE O/P EST MOD 30 MIN: CPT | Mod: PBBFAC | Performed by: PAIN MEDICINE

## 2024-01-10 PROCEDURE — 99999PBSHW POCT URINE DRUG SCREEN PRESUMP: Mod: PBBFAC,,,

## 2024-01-10 PROCEDURE — 80305 DRUG TEST PRSMV DIR OPT OBS: CPT | Mod: PBBFAC | Performed by: PAIN MEDICINE

## 2024-01-10 RX ORDER — TRAMADOL HYDROCHLORIDE 50 MG/1
50 TABLET ORAL EVERY 6 HOURS PRN
Qty: 120 TABLET | Refills: 1 | Status: SHIPPED | OUTPATIENT
Start: 2024-01-10

## 2024-01-12 ENCOUNTER — CLINICAL SUPPORT (OUTPATIENT)
Dept: REHABILITATION | Facility: HOSPITAL | Age: 69
End: 2024-01-12
Payer: MEDICARE

## 2024-01-12 DIAGNOSIS — M25.512 ACUTE PAIN OF LEFT SHOULDER: Primary | ICD-10-CM

## 2024-01-12 PROCEDURE — 97110 THERAPEUTIC EXERCISES: CPT

## 2024-01-12 NOTE — PROGRESS NOTES
"[facility]   Physical Therapy Treatment Note     Name: Lorraine Brambila  Clinic Number: [id number]    Therapy Diagnosis:   Encounter Diagnosis   Name Primary?    Acute pain of left shoulder Yes     Physician: Abdirahman Gill MD    Visit Date: 1/12/2024    Physician Orders: PT Eval and Treat   Medical Diagnosis from Referral: displaced fracture of L humerus  Evaluation Date: 12/28/2023  Authorization Period Expiration: 11/26/2024  Plan of Care Expiration: 1/26/2024  Progress Note Due: 1/26/2024  Date of Surgery: NA   Visit # / Visits authorized: 5/12   FOTO: to be completed on visit 6      Precautions: Standard and Fall      Time In: 11:01  Time Out: 11:33  Total Billable Time: 32 minutes     PTA Visit #: 0/5   SUBJECTIVE   Pt reports: "This one is not hurting too bad but now my right one is sore from getting a COVID shot."    She was compliant with home exercise program.  Response to previous treatment: N/A  Functional change: Too early in Plan of Care     Pain: 3/10  Location: left shoulder      OBJECTIVE   Objective Measures updated at progress report unless specified.   Treatment   LORRAINE received the treatments listed below:      therapeutic exercises to develop strength, endurance, ROM, flexibility, and posture for 35 minutes including: see flowsheet below      Therapeutic-Exercise  Reps          Pulleys (flexion and ABD) 4 mins each    Finger Ladder 5 x 5" flexion *    Scapular retractions  30 x 3" *   Ball Rolls flexion and ABD  10 x 10" each *   Table Slides (flexion and scaption)  10 x 10" Each *   Wand flexion stretch  10 x 10" *   Wand ER stretch  5 x 10"                                Patient Education and Home Exercises     Home Exercises Provided and Patient Education Provided     Education provided:   - Home exercise program, Plan of Care, Delayed onset muscle soreness     Written Home Exercises Provided: Patient instructed to cont prior HEP. Exercises were reviewed " and LORRAINE was able to demonstrate them prior to the end of the session.  LORRAINE demonstrated good  understanding of the education provided. See EMR under Patient Instructions for exercises provided during therapy sessions    ASSESSMENT   Lorraine is a 68 y.o. female referred to outpatient Physical Therapy with a medical diagnosis of displaced L humerus fracture. Patient presents with L shoulder pain, decreased L shoulder ROM, and decreased L UE muscle strength and motor control. Patient's impairments are currently limiting her  functional use of L UE to complete ADLs. PT provided patient with proper setup on pulleys to decrease L shoulder stiffness and increase ROM prior to exercises. Patient required moderate verbal and visual cueing for decreased speed and increased control with exercises to increase the effectiveness. Patient had no reports of increased pain or adverse effects to treatment.     Patient prognosis is Good.   Patient will benefit from skilled outpatient Physical Therapy to address the deficits stated above and in the chart below, provide patient /family education, and to maximize patientt's level of independence.      Plan of care discussed with patient: Yes  Patient's spiritual, cultural and educational needs considered and patient is agreeable to the plan of care and goals as stated below:      Anticipated Barriers for therapy: compliance with Home exercise program, guarding  Goals:  Short Term Goals: 2 weeks   Patient will independently complete Home exercise program with correct form.   Patient will report decreased L shoulder pain at rest to less than or equal to 2/10 for improved quality of life.      Long Term Goals: 4 weeks   Pt will increase L shoulder flexion to 120 degrees, external rotation to C4, and internal rotation to L2 for independent dressing  Pt will increase L upper extremity to 4/5 to allow patient to perform activities of daily living independently  Pt will report decrease in L  shoulder pain to 3/10 at worst to improve quality of life  Patient will have increased FOTO score to greater than or equal to 55% indicating increased function.    PLAN   Continue POC per PT orders to progress patient toward rehab goals.     Mia Wheatley, PT, DPT   1/12/2024

## 2024-01-17 ENCOUNTER — CLINICAL SUPPORT (OUTPATIENT)
Dept: REHABILITATION | Facility: HOSPITAL | Age: 69
End: 2024-01-17
Payer: MEDICARE

## 2024-01-17 DIAGNOSIS — M25.512 ACUTE PAIN OF LEFT SHOULDER: Primary | ICD-10-CM

## 2024-01-17 PROCEDURE — 97110 THERAPEUTIC EXERCISES: CPT | Mod: CQ

## 2024-01-17 NOTE — PROGRESS NOTES
"[facility]   Physical Therapy Treatment Note     Name: Lorraine Brambila  Clinic Number: [id number]    Therapy Diagnosis:   No diagnosis found.    Physician: Abdirahman Gill MD    Visit Date: 1/17/2024    Physician Orders: PT Eval and Treat   Medical Diagnosis from Referral: displaced fracture of L humerus  Evaluation Date: 12/28/2023  Authorization Period Expiration: 11/26/2024  Plan of Care Expiration: 1/26/2024  Progress Note Due: 1/26/2024  Date of Surgery: NA   Visit # / Visits authorized: 5/12   FOTO: to be completed on visit 6      Precautions: Standard and Fall      Time In: 11:05  Time Out: 11:33  Total Billable Time: 28 minutes     PTA Visit #: 1/5   SUBJECTIVE   Pt reports: "I'm stiff today."    She was compliant with home exercise program.  Response to previous treatment: N/A  Functional change: Too early in Plan of Care     Pain: 3/10  Location: left shoulder      OBJECTIVE   Objective Measures updated at progress report unless specified.   Treatment   LORRAINE received the treatments listed below:      therapeutic exercises to develop strength, endurance, ROM, flexibility, and posture for 28 minutes including: see flowsheet below      Therapeutic-Exercise  Reps          Pulleys (flexion and ABD) 4 mins each    Finger Ladder 5 x 5" flexion    Scapular retractions  30 x 3"    Ball Rolls flexion and ABD  10 x 10" each    Table Slides (flexion and scaption)  10 x 10" Each    Wand flexion stretch  10 x 10"    Wand ER stretch  5 x 10"                                Patient Education and Home Exercises     Home Exercises Provided and Patient Education Provided     Education provided:   - Home exercise program, Plan of Care, Delayed onset muscle soreness     Written Home Exercises Provided: Patient instructed to cont prior HEP. Exercises were reviewed and LORRAINE was able to demonstrate them prior to the end of the session.  LORRAINE demonstrated good  understanding of the education " provided. See EMR under Patient Instructions for exercises provided during therapy sessions    ASSESSMENT   Lorraine is a 68 y.o. female referred to outpatient Physical Therapy with a medical diagnosis of displaced L humerus fracture. Patient presents with L shoulder pain, decreased L shoulder ROM, and decreased L UE muscle strength and motor control. Patient's impairments are currently limiting her  functional use of L UE to complete ADLs. LPTA provided pt with proper setup on pulleys to decrease L shoulder stiffness and increase ROM prior to exercises. Pt continues to require moderate verbal and visual cueing for decreased speed and increased control with exercises to increase the effectiveness. Pt reports no pain or adverse effects from tx.    Patient prognosis is Good.   Patient will benefit from skilled outpatient Physical Therapy to address the deficits stated above and in the chart below, provide patient /family education, and to maximize patientt's level of independence.      Plan of care discussed with patient: Yes  Patient's spiritual, cultural and educational needs considered and patient is agreeable to the plan of care and goals as stated below:      Anticipated Barriers for therapy: compliance with Home exercise program, guarding  Goals:  Short Term Goals: 2 weeks   Patient will independently complete Home exercise program with correct form.   Patient will report decreased L shoulder pain at rest to less than or equal to 2/10 for improved quality of life.      Long Term Goals: 4 weeks   Pt will increase L shoulder flexion to 120 degrees, external rotation to C4, and internal rotation to L2 for independent dressing  Pt will increase L upper extremity to 4/5 to allow patient to perform activities of daily living independently  Pt will report decrease in L shoulder pain to 3/10 at worst to improve quality of life  Patient will have increased FOTO score to greater than or equal to 55% indicating increased  function.    PLAN   Continue POC per PT orders to progress patient toward rehab goals.   YOVANNY Sanchez   1/17/2024

## 2024-01-19 ENCOUNTER — CLINICAL SUPPORT (OUTPATIENT)
Dept: REHABILITATION | Facility: HOSPITAL | Age: 69
End: 2024-01-19
Payer: MEDICARE

## 2024-01-19 DIAGNOSIS — M25.512 ACUTE PAIN OF LEFT SHOULDER: Primary | ICD-10-CM

## 2024-01-19 PROCEDURE — 97110 THERAPEUTIC EXERCISES: CPT | Mod: CQ

## 2024-01-19 NOTE — PROGRESS NOTES
"[facility]   Physical Therapy Treatment Note     Name: Lorraine Brambila  Clinic Number: [id number]    Therapy Diagnosis:   No diagnosis found.    Physician: Abdirahman Gill MD    Visit Date: 1/19/2024    Physician Orders: PT Eval and Treat   Medical Diagnosis from Referral: displaced fracture of L humerus  Evaluation Date: 12/28/2023  Authorization Period Expiration: 11/26/2024  Plan of Care Expiration: 1/26/2024  Progress Note Due: 1/26/2024  Date of Surgery: NA   Visit # / Visits authorized: 6/12   FOTO: to be completed on visit 12     Precautions: Standard and Fall      Time In: 11:10  Time Out: 11:45  Total Billable Time: 35 minutes     PTA Visit #: 2/5  SUBJECTIVE   Pt reports: "I have some pain in my shoulder off and on, but it's not hurting me too bad today".     She was compliant with home exercise program.  Response to previous treatment: N/A  Functional change: Too early in Plan of Care     Pain: 1/10  Location: left shoulder      OBJECTIVE   Objective Measures updated at progress report unless specified.   Treatment   LORRAINE received the treatments listed below:      therapeutic exercises to develop strength, endurance, ROM, flexibility, and posture for 35 minutes including: see flowsheet below      Therapeutic-Exercise  Reps          Pulleys (flexion and ABD) 4 mins each    Finger Ladder 5 x 5" flexion    Scapular retractions  30 x 3"    Ball Rolls flexion and ABD  10 x 10" each    Table Slides (flexion and scaption)  10 x 10" Each    Wand flexion stretch  10 x 10"    Wand ER stretch  5 x 10"          Isometric flexion   10 x 3" *    Isometric ER  10 x 3" *    Isometric IR  10 x 3" *    Isometric extension   10 x 3" *     Hand Gripper   30 x 3" Green *        Patient Education and Home Exercises     Home Exercises Provided and Patient Education Provided     Education provided:   - Home exercise program, Plan of Care, Delayed onset muscle soreness     Written Home " Exercises Provided: Patient instructed to cont prior HEP. Exercises were reviewed and LORRAINE was able to demonstrate them prior to the end of the session.  LORRAINE demonstrated good  understanding of the education provided. See EMR under Patient Instructions for exercises provided during therapy sessions    ASSESSMENT   Lorraine is a 68 y.o. female referred to outpatient Physical Therapy with a medical diagnosis of displaced L humerus fracture. Patient presents with L shoulder pain, decreased L shoulder ROM, and decreased L UE muscle strength and motor control. Patient's impairments are currently limiting her  functional use of L UE to complete ADLs. LPTA provided pt with proper setup on pulleys to decrease L shoulder stiffness and increase ROM prior to exercises.  Added isometrics and Hand gripper to PT treatment session. Patient requires mod verbal and visual cues and min tactile cues to progress through Therapeutic exercises with proper alignment, speed of movement, count, and hold times.  Patient reports muscle fatigue without adverse effects noted.         Patient prognosis is Good.   Patient will benefit from skilled outpatient Physical Therapy to address the deficits stated above and in the chart below, provide patient /family education, and to maximize patientt's level of independence.      Plan of care discussed with patient: Yes  Patient's spiritual, cultural and educational needs considered and patient is agreeable to the plan of care and goals as stated below:      Anticipated Barriers for therapy: compliance with Home exercise program, guarding  Goals:  Short Term Goals: 2 weeks   Patient will independently complete Home exercise program with correct form.   Patient will report decreased L shoulder pain at rest to less than or equal to 2/10 for improved quality of life.      Long Term Goals: 4 weeks   Pt will increase L shoulder flexion to 120 degrees, external rotation to C4, and internal rotation to L2 for  independent dressing  Pt will increase L upper extremity to 4/5 to allow patient to perform activities of daily living independently  Pt will report decrease in L shoulder pain to 3/10 at worst to improve quality of life  Patient will have increased FOTO score to greater than or equal to 55% indicating increased function.    PLAN   Continue POC per PT orders to progress patient toward rehab goals.   Yvonne HAIRSTON   1/19/2024

## 2024-01-19 NOTE — ADDENDUM NOTE
Encounter addended by: Kenya Montaño on: 1/19/2024 9:51 AM   Actions taken: Charge Capture section accepted

## 2024-01-22 ENCOUNTER — CLINICAL SUPPORT (OUTPATIENT)
Dept: REHABILITATION | Facility: HOSPITAL | Age: 69
End: 2024-01-22
Payer: MEDICARE

## 2024-01-22 DIAGNOSIS — S42.212D CLOSED DISPLACED FRACTURE OF SURGICAL NECK OF LEFT HUMERUS WITH ROUTINE HEALING, UNSPECIFIED FRACTURE MORPHOLOGY, SUBSEQUENT ENCOUNTER: ICD-10-CM

## 2024-01-22 DIAGNOSIS — M25.512 ACUTE PAIN OF LEFT SHOULDER: Primary | ICD-10-CM

## 2024-01-22 PROCEDURE — 97110 THERAPEUTIC EXERCISES: CPT | Mod: CQ

## 2024-01-22 NOTE — PROGRESS NOTES
"[facility]   Physical Therapy Treatment Note     Name: Lorraine Brambila  Clinic Number: [id number]    Therapy Diagnosis:   No diagnosis found.    Physician: Abdirahman Gill MD    Visit Date: 1/22/2024    Physician Orders: PT Eval and Treat   Medical Diagnosis from Referral: displaced fracture of L humerus  Evaluation Date: 12/28/2023  Authorization Period Expiration: 11/26/2024  Plan of Care Expiration: 1/26/2024  Progress Note Due: 1/26/2024  Date of Surgery: NA   Visit # / Visits authorized: 7/12   FOTO: to be completed on visit 12     Precautions: Standard and Fall      Time In: 11:08  Time Out: 11:40  Total Billable Time: 32 minutes     PTA Visit #: 3/5  SUBJECTIVE   Pt reports:  "I'm aching all over today, and my shoulder hurt all weekend, but I think it's from using it to do housework".     She was compliant with home exercise program.  Response to previous treatment: N/A  Functional change: Too early in Plan of Care     Pain: 1/10  Location: left shoulder      OBJECTIVE   Objective Measures updated at progress report unless specified.   Treatment   LORRAINE received the treatments listed below:      therapeutic exercises to develop strength, endurance, ROM, flexibility, and posture for 32 minutes including: see flowsheet below      Therapeutic-Exercise  Reps          Pulleys (flexion and ABD) 4 mins each    Finger Ladder 5 x 5" flexion    Scapular retractions  30 x 3"    Ball Rolls flexion and ABD  10 x 10" each    Table Slides (flexion and scaption)  10 x 10" Each    Wand flexion stretch  10 x 10"    Wand ER stretch  5 x 10"          Isometric flexion   10 x 3" *    Isometric ER  10 x 3" *    Isometric IR  10 x 3" *    Isometric extension   10 x 3" *     Hand Gripper   30 x 3" Green *        Patient Education and Home Exercises     Home Exercises Provided and Patient Education Provided     Education provided:   - Home exercise program, Plan of Care, Delayed onset muscle " soreness     Written Home Exercises Provided: Patient instructed to cont prior HEP. Exercises were reviewed and LORRAINE was able to demonstrate them prior to the end of the session.  LORRAINE demonstrated good  understanding of the education provided. See EMR under Patient Instructions for exercises provided during therapy sessions    ASSESSMENT   Lorraine is a 68 y.o. female referred to outpatient Physical Therapy with a medical diagnosis of displaced L humerus fracture. Patient presents with L shoulder pain, decreased L shoulder ROM, and decreased L UE muscle strength and motor control. Patient's impairments are currently limiting her  functional use of L UE to complete ADLs. LPTA provided pt with proper setup on pulleys to decrease L shoulder stiffness and increase ROM prior to exercises.   Patient reports complaints of generally not feeling well throughout treatment session.  Patient requires mod and frequent cues x 3 to complete Therapeutic exercises with proper alignment, speed of movement, count, and hold times.  No adverse effects to treatment session noted.       Patient prognosis is Good.   Patient will benefit from skilled outpatient Physical Therapy to address the deficits stated above and in the chart below, provide patient /family education, and to maximize patientt's level of independence.      Plan of care discussed with patient: Yes  Patient's spiritual, cultural and educational needs considered and patient is agreeable to the plan of care and goals as stated below:      Anticipated Barriers for therapy: compliance with Home exercise program, guarding  Goals:  Short Term Goals: 2 weeks   Patient will independently complete Home exercise program with correct form.   Patient will report decreased L shoulder pain at rest to less than or equal to 2/10 for improved quality of life.      Long Term Goals: 4 weeks   Pt will increase L shoulder flexion to 120 degrees, external rotation to C4, and internal rotation  to L2 for independent dressing  Pt will increase L upper extremity to 4/5 to allow patient to perform activities of daily living independently  Pt will report decrease in L shoulder pain to 3/10 at worst to improve quality of life  Patient will have increased FOTO score to greater than or equal to 55% indicating increased function.    PLAN   Continue POC per PT orders to progress patient toward rehab goals.   Yvonne HAIRSTON   1/22/2024

## 2024-01-23 ENCOUNTER — CLINICAL SUPPORT (OUTPATIENT)
Dept: REHABILITATION | Facility: HOSPITAL | Age: 69
End: 2024-01-23
Payer: MEDICARE

## 2024-01-23 DIAGNOSIS — M25.512 ACUTE PAIN OF LEFT SHOULDER: Primary | ICD-10-CM

## 2024-01-23 PROCEDURE — 97110 THERAPEUTIC EXERCISES: CPT | Mod: CQ

## 2024-01-23 NOTE — PROGRESS NOTES
"[facility]   Physical Therapy Treatment Note     Name: Lorraine Brambila  Clinic Number: [id number]    Therapy Diagnosis:   Encounter Diagnosis   Name Primary?    Acute pain of left shoulder Yes       Physician: Abdirahman Gill MD    Visit Date: 1/23/2024    Physician Orders: PT Eval and Treat   Medical Diagnosis from Referral: displaced fracture of L humerus  Evaluation Date: 12/28/2023  Authorization Period Expiration: 11/26/2024  Plan of Care Expiration: 1/26/2024  Progress Note Due: 1/26/2024  Date of Surgery: NA   Visit # / Visits authorized: 9/12   FOTO: to be completed on visit 12     Precautions: Standard and Fall      Time In: 11:06  Time Out: 11:31  Total Billable Time: 25 minutes     PTA Visit #: 4/5  SUBJECTIVE   Pt reports:  "I'm hurting a little bit but it's getting better".     She was compliant with home exercise program.  Response to previous treatment: N/A  Functional change: Too early in Plan of Care     Pain: 3/10  Location: left shoulder      OBJECTIVE   Objective Measures updated at progress report unless specified.   Treatment   LORRAINE received the treatments listed below:      therapeutic exercises to develop strength, endurance, ROM, flexibility, and posture for 35 minutes including: see flowsheet below      Therapeutic-Exercise  Reps          Pulleys (flexion and ABD) 4 mins each    Finger Ladder 5 x 5" flexion    Scapular retractions  30 x 3"    Ball Rolls flexion and ABD  10 x 10" each    Table Slides (flexion and scaption)  10 x 10" Each    Wand flexion stretch  10 x 10"    Wand ER stretch  5 x 10"          Isometric flexion   10 x 3"    Isometric ER  10 x 3"    Isometric IR  10 x 3"    Isometric extension   10 x 3"     Hand Gripper   30 x 3" Green        Patient Education and Home Exercises     Home Exercises Provided and Patient Education Provided     Education provided:   - Home exercise program, Plan of Care, Delayed onset muscle soreness     Written " Home Exercises Provided: Patient instructed to cont prior HEP. Exercises were reviewed and LORRAINE was able to demonstrate them prior to the end of the session.  LORRAINE demonstrated good  understanding of the education provided. See EMR under Patient Instructions for exercises provided during therapy sessions    ASSESSMENT   Lorraine is a 68 y.o. female referred to outpatient Physical Therapy with a medical diagnosis of displaced L humerus fracture. Patient presents with L shoulder pain, decreased L shoulder ROM, and decreased L UE muscle strength and motor control. Patient's impairments are currently limiting her  functional use of L UE to complete ADLs. LPTA provided pt with proper setup on pulleys to decrease L shoulder stiffness and increase ROM prior to exercises.  Pt tolerated recent tx intensity increase with no c/o pain. Pt continues to require mod verbal and visual cues and min tactile cues to progress through Therapeutic exercises with proper alignment, speed of movement, count, and hold times.  Pt reports some fatigue with isometrics but recovers quickly with short rest break. No adverse effects noted.      Patient prognosis is Good.   Patient will benefit from skilled outpatient Physical Therapy to address the deficits stated above and in the chart below, provide patient /family education, and to maximize patientt's level of independence.      Plan of care discussed with patient: Yes  Patient's spiritual, cultural and educational needs considered and patient is agreeable to the plan of care and goals as stated below:      Anticipated Barriers for therapy: compliance with Home exercise program, guarding  Goals:  Short Term Goals: 2 weeks   Patient will independently complete Home exercise program with correct form.   Patient will report decreased L shoulder pain at rest to less than or equal to 2/10 for improved quality of life.      Long Term Goals: 4 weeks   Pt will increase L shoulder flexion to 120 degrees,  external rotation to C4, and internal rotation to L2 for independent dressing  Pt will increase L upper extremity to 4/5 to allow patient to perform activities of daily living independently  Pt will report decrease in L shoulder pain to 3/10 at worst to improve quality of life  Patient will have increased FOTO score to greater than or equal to 55% indicating increased function.    PLAN   Continue POC per PT orders to progress patient toward rehab goals.   YOVANNY Sanchez   1/23/2024

## 2024-01-24 DIAGNOSIS — S42.212D CLOSED DISPLACED FRACTURE OF SURGICAL NECK OF LEFT HUMERUS WITH ROUTINE HEALING, UNSPECIFIED FRACTURE MORPHOLOGY, SUBSEQUENT ENCOUNTER: Primary | ICD-10-CM

## 2024-01-25 ENCOUNTER — CLINICAL SUPPORT (OUTPATIENT)
Dept: REHABILITATION | Facility: HOSPITAL | Age: 69
End: 2024-01-25
Payer: MEDICARE

## 2024-01-25 DIAGNOSIS — M25.512 ACUTE PAIN OF LEFT SHOULDER: Primary | ICD-10-CM

## 2024-01-25 PROCEDURE — 97110 THERAPEUTIC EXERCISES: CPT

## 2024-01-25 NOTE — PLAN OF CARE
"[facility]  Physical Therapy Plan of Care Note     Name: Lorraine Brambila  Clinic Number: [id number]    Therapy Diagnosis:   Encounter Diagnosis   Name Primary?    Acute pain of left shoulder Yes     Physician: Abdirahman Gill MD    Visit Date: 1/25/2024    Physician Orders: PT Eval and Treat   Medical Diagnosis from Referral: displaced fracture of L humerus  Evaluation Date: 12/28/2023  Authorization Period Expiration: 11/26/2024  Plan of Care Expiration: 2/23/2024  Progress Note Due:2/23/2024  Date of Surgery: NA   Visit # / Visits authorized: 10/24  FOTO: to be completed on visit 12    Precautions: Standard  Functional Level Prior to Evaluation:  Independent     Subjective     Update: "I feel like my motion is getting better. I have been using it at home to do housework."     Objective      Update: Patient demonstrates increased L shoulder active range of motion and increasing functional use of L UE.     Assessment     Update: Patient can benefit from continued skilled PT services to continue progressing toward rehab goals.     Previous Short Term Goals Status:     Patient will independently complete Home exercise program with correct form. -MET   Patient will report decreased L shoulder pain at rest to less than or equal to 2/10 for improved quality of life. -MET   Long Term Goal Status: continue per initial plan of care.  Reasons for Recertification of Therapy:   continue progressing toward rehab goals.    GOALS  Pt will increase L shoulder flexion to 120 degrees, external rotation to C4, and internal rotation to L2 for independent dressing-Goal in progress; flexion:80 degrees, PSIS IR functional reach and C1 ER functional reach.   Pt will increase L upper extremity to 4/5 to allow patient to perform activities of daily living independently-Goal in progress  Pt will report decrease in L shoulder pain to 3/10 at worst to improve quality of life-Goal in progress  Patient will " have increased FOTO score to greater than or equal to 55% indicating increased function.-Goal in progress    Plan     Updated Certification Period: 1/25/2024  to 2/23/2024   Recommended Treatment Plan: 3 times per week for 4 weeks:  Electrical Stimulation unattended, Manual Therapy, Moist Heat/ Ice, Patient Education, Therapeutic Activities, and Therapeutic Exercise      Mia Wheatley, PT,DPT

## 2024-01-29 ENCOUNTER — OFFICE VISIT (OUTPATIENT)
Dept: ORTHOPEDICS | Facility: CLINIC | Age: 69
End: 2024-01-29
Payer: MEDICARE

## 2024-01-29 ENCOUNTER — HOSPITAL ENCOUNTER (OUTPATIENT)
Dept: RADIOLOGY | Facility: HOSPITAL | Age: 69
Discharge: HOME | End: 2024-01-29
Attending: ORTHOPAEDIC SURGERY
Payer: MEDICARE

## 2024-01-29 DIAGNOSIS — S42.212D CLOSED DISPLACED FRACTURE OF SURGICAL NECK OF LEFT HUMERUS WITH ROUTINE HEALING, UNSPECIFIED FRACTURE MORPHOLOGY, SUBSEQUENT ENCOUNTER: ICD-10-CM

## 2024-01-29 DIAGNOSIS — S42.92XD CLOSED FRACTURE OF LEFT SHOULDER WITH ROUTINE HEALING, SUBSEQUENT ENCOUNTER: Primary | ICD-10-CM

## 2024-01-29 PROCEDURE — 73030 X-RAY EXAM OF SHOULDER: CPT | Mod: 26,LT,, | Performed by: ORTHOPAEDIC SURGERY

## 2024-01-29 PROCEDURE — 99213 OFFICE O/P EST LOW 20 MIN: CPT | Mod: PBBFAC | Performed by: ORTHOPAEDIC SURGERY

## 2024-01-29 PROCEDURE — 99214 OFFICE O/P EST MOD 30 MIN: CPT | Mod: S$PBB,,, | Performed by: ORTHOPAEDIC SURGERY

## 2024-01-29 PROCEDURE — 73030 X-RAY EXAM OF SHOULDER: CPT | Mod: TC,LT
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
## 2024-01-31 ENCOUNTER — CLINICAL SUPPORT (OUTPATIENT)
Dept: REHABILITATION | Facility: HOSPITAL | Age: 69
End: 2024-01-31
Payer: MEDICARE

## 2024-01-31 DIAGNOSIS — S42.212D CLOSED DISPLACED FRACTURE OF SURGICAL NECK OF LEFT HUMERUS WITH ROUTINE HEALING, UNSPECIFIED FRACTURE MORPHOLOGY, SUBSEQUENT ENCOUNTER: Primary | ICD-10-CM

## 2024-01-31 PROCEDURE — 97110 THERAPEUTIC EXERCISES: CPT | Mod: CQ

## 2024-01-31 NOTE — PROGRESS NOTES
"[facility]   Physical Therapy Treatment Note     Name: Lorraine Brambila  Clinic Number: [id number]    Therapy Diagnosis:   No diagnosis found.      Physician: Abdirahman Gill MD    Visit Date: 1/31/2024    Physician Orders: PT Eval and Treat   Medical Diagnosis from Referral: displaced fracture of L humerus  Evaluation Date: 12/28/2023  Authorization Period Expiration: 11/26/2024  Plan of Care Expiration: 2/23/2024  Progress Note Due:2/23/2024  Date of Surgery: NA   Visit # / Visits authorized: 10/24  FOTO: to be completed on visit 12     Precautions: Standard and Fall      Time In: 11:05  Time Out: 11:38  Total Billable Time: 33 minutes     PTA Visit #: 1/5  SUBJECTIVE   Pt reports:  "I saw my doctor yesterday, and he said my shoulder is doing okay".     She was compliant with home exercise program.  Response to previous treatment: N/A  Functional change: increased use of L UE for ADLs    Pain: 3/10  Location: left shoulder      OBJECTIVE   Objective Measures updated at progress report unless specified.   Treatment   LORRAINE received the treatments listed below:      therapeutic exercises to develop strength, endurance, ROM, flexibility, and posture for 33 minutes including: see flowsheet below      Therapeutic-Exercise  Reps          Pulleys (flexion and ABD) 4 mins each    Finger Ladder 5 x 5" flexion    Scapular retractions  30 x 3"    Ball Rolls flexion and ABD  10 x 10" each    Table Slides (flexion and scaption)  10 x 10" Each    Wand flexion stretch  10 x 10"    Wand ER stretch  5 x 10"          Isometric flexion   10 x 3"    Isometric ER  10 x 3"    Isometric IR  10 x 3"    Isometric extension   10 x 3"     Hand Gripper   30 x 3" Green        Patient Education and Home Exercises     Home Exercises Provided and Patient Education Provided     Education provided:   - Home exercise program, Plan of Care, Delayed onset muscle soreness     Written Home Exercises Provided: Patient " instructed to cont prior HEP. Exercises were reviewed and LORRAINE was able to demonstrate them prior to the end of the session.  LORRAINE demonstrated good  understanding of the education provided. See EMR under Patient Instructions for exercises provided during therapy sessions    ASSESSMENT   Lorraine is a 68 y.o. female referred to outpatient Physical Therapy with a medical diagnosis of displaced L humerus fracture. Patient presents with L shoulder pain, decreased L shoulder ROM, and decreased L UE muscle strength and motor control. Patient's impairments are currently limiting her  functional use of L UE to complete ADLs. LPTA  provided visual and tactile cueing throughout warm-up and treatment for proper form, posture, decreased compensations, and movement through increased ROM.   Patient requires mod cues to decrease speed of movement and for correct hold times during completion of Therapeutic exercises.  Patient continues to demonstrate moderate guarding during manual stretching and Passive range of motion.     Patient prognosis is Good.   Patient will benefit from skilled outpatient Physical Therapy to address the deficits stated above and in the chart below, provide patient /family education, and to maximize patientt's level of independence.      Plan of care discussed with patient: Yes  Patient's spiritual, cultural and educational needs considered and patient is agreeable to the plan of care and goals as stated below:      Anticipated Barriers for therapy: compliance with Home exercise program, guarding  Goals:  Short Term Goals: 2 weeks   Patient will independently complete Home exercise program with correct form.   Patient will report decreased L shoulder pain at rest to less than or equal to 2/10 for improved quality of life.      Long Term Goals: 4 weeks   Pt will increase L shoulder flexion to 120 degrees, external rotation to C4, and internal rotation to L2 for independent dressing  Pt will increase L upper  extremity to 4/5 to allow patient to perform activities of daily living independently  Pt will report decrease in L shoulder pain to 3/10 at worst to improve quality of life  Patient will have increased FOTO score to greater than or equal to 55% indicating increased function.    PLAN   Continue POC per PT orders to progress patient toward rehab goals.   Recert 3 x 4 weeks to begin week of 1/29/2024.   YOVANNY Rahman     1/31/2024

## 2024-02-02 ENCOUNTER — CLINICAL SUPPORT (OUTPATIENT)
Dept: REHABILITATION | Facility: HOSPITAL | Age: 69
End: 2024-02-02
Payer: MEDICARE

## 2024-02-02 DIAGNOSIS — M25.512 ACUTE PAIN OF LEFT SHOULDER: Primary | ICD-10-CM

## 2024-02-02 PROCEDURE — 97110 THERAPEUTIC EXERCISES: CPT | Mod: CQ

## 2024-02-02 NOTE — PROGRESS NOTES
"[facility]   Physical Therapy Treatment Note     Name: Lorraine Brambila  Clinic Number: [id number]    Therapy Diagnosis:   Encounter Diagnosis   Name Primary?    Acute pain of left shoulder Yes         Physician: Abdirahman Gill MD    Visit Date: 2/2/2024    Physician Orders: PT Eval and Treat   Medical Diagnosis from Referral: displaced fracture of L humerus  Evaluation Date: 12/28/2023  Authorization Period Expiration: 11/26/2024  Plan of Care Expiration: 2/23/2024  Progress Note Due:2/23/2024  Date of Surgery: NA   Visit # / Visits authorized: 12/24  FOTO: to be completed on visit 13     Precautions: Standard and Fall      Time In: 11:08  Time Out: 11:38  Total Billable Time: 30 minutes     PTA Visit #: 2/5  SUBJECTIVE   Pt reports:  "I feel okay, I'm just lazy today".     She was compliant with home exercise program.  Response to previous treatment: N/A  Functional change: increased use of L UE for ADLs    Pain: 3/10  Location: left shoulder      OBJECTIVE   Objective Measures updated at progress report unless specified.   Treatment   LORRAINE received the treatments listed below:      therapeutic exercises to develop strength, endurance, ROM, flexibility, and posture for 33 minutes including: see flowsheet below      Therapeutic-Exercise  Reps          Pulleys (flexion and ABD) 4 mins each    Finger Ladder 5 x 5" flexion    Scapular retractions  30 x 3"    Ball Rolls flexion and ABD  10 x 10" each    Table Slides (flexion and scaption)  10 x 10" Each    Wand flexion stretch  10 x 10"    Wand ER stretch  5 x 10"          Isometric flexion   10 x 3"    Isometric ER  10 x 3"    Isometric IR  10 x 3"    Isometric extension   10 x 3"     Hand Gripper   30 x 3" Green        Patient Education and Home Exercises     Home Exercises Provided and Patient Education Provided     Education provided:   - Home exercise program, Plan of Care, Delayed onset muscle soreness     Written Home " Exercises Provided: Patient instructed to cont prior HEP. Exercises were reviewed and LORRAINE was able to demonstrate them prior to the end of the session.  LORRAINE demonstrated good  understanding of the education provided. See EMR under Patient Instructions for exercises provided during therapy sessions    ASSESSMENT   Lorraine is a 68 y.o. female referred to outpatient Physical Therapy with a medical diagnosis of displaced L humerus fracture. Patient presents with L shoulder pain, decreased L shoulder ROM, and decreased L UE muscle strength and motor control. Patient's impairments are currently limiting her  functional use of L UE to complete ADLs. LPTA  provided visual and tactile cueing throughout warm-up and treatment for proper form, posture, decreased compensations, and movement through increased ROM. LPTA provided increased cueing for pt to decrease compensating with body movement during isometric exercises.   Pt requires occasional rest breaks secondary to muscle fatigue.  No adverse effects noted or reported.     Patient prognosis is Good.   Patient will benefit from skilled outpatient Physical Therapy to address the deficits stated above and in the chart below, provide patient /family education, and to maximize patientt's level of independence.      Plan of care discussed with patient: Yes  Patient's spiritual, cultural and educational needs considered and patient is agreeable to the plan of care and goals as stated below:      Anticipated Barriers for therapy: compliance with Home exercise program, guarding  Goals:  Short Term Goals: 2 weeks   Patient will independently complete Home exercise program with correct form.   Patient will report decreased L shoulder pain at rest to less than or equal to 2/10 for improved quality of life.      Long Term Goals: 4 weeks   Pt will increase L shoulder flexion to 120 degrees, external rotation to C4, and internal rotation to L2 for independent dressing  Pt will increase  L upper extremity to 4/5 to allow patient to perform activities of daily living independently  Pt will report decrease in L shoulder pain to 3/10 at worst to improve quality of life  Patient will have increased FOTO score to greater than or equal to 55% indicating increased function.    PLAN   Continue POC per PT orders to progress patient toward rehab goals.   Recert 3 x 4 weeks to begin week of 1/29/2024.   YOVANNY Sanchez   2/2/2024

## 2024-02-05 ENCOUNTER — CLINICAL SUPPORT (OUTPATIENT)
Dept: REHABILITATION | Facility: HOSPITAL | Age: 69
End: 2024-02-05
Payer: MEDICARE

## 2024-02-05 DIAGNOSIS — M25.512 ACUTE PAIN OF LEFT SHOULDER: Primary | ICD-10-CM

## 2024-02-05 PROCEDURE — 97110 THERAPEUTIC EXERCISES: CPT | Mod: CQ

## 2024-02-05 NOTE — PROGRESS NOTES
"[facility]   Physical Therapy Treatment Note     Name: Lorraine Balbuena [place]  Clinic Number: [id number]    Therapy Diagnosis:   Encounter Diagnosis   Name Primary?    Acute pain of left shoulder Yes         Physician: Abdirahman Gill MD    Visit Date: 2/5/2024    Physician Orders: PT Eval and Treat   Medical Diagnosis from Referral: displaced fracture of L humerus  Evaluation Date: 12/28/2023  Authorization Period Expiration: 11/26/2024  Plan of Care Expiration: 2/23/2024  Progress Note Due:2/23/2024  Date of Surgery: NA   Visit # / Visits authorized: 13/24  FOTO: to be completed on visit 13     Precautions: Standard and Fall      Time In: 11:06  Time Out: 11:45  Total Billable Time: 39 minutes     PTA Visit #: 3/5  SUBJECTIVE   Pt reports:  "I'm able to use it a little more, but I'm still having trouble picking my arm up like to put on deodorant and fixing my hair".     She was compliant with home exercise program.  Response to previous treatment: N/A  Functional change: increased use of L UE for ADLs    Pain: 3/10 with activity   Location: left shoulder      OBJECTIVE   Objective Measures updated at progress report unless specified.   Treatment   LORRAINE received the treatments listed below:      therapeutic exercises to develop strength, endurance, ROM, flexibility, and posture for 39 minutes including: see flowsheet below      Therapeutic-Exercise  Reps          Pulleys (flexion and ABD) 4 mins each    Finger Ladder 5 x 5" flexion    Scapular retractions  30 x 3"    Ball Rolls flexion and ABD  10 x 10" each    Table Slides (flexion and scaption)  10 x 10" Each    Active range of motion flexion  10 x *    Active range of motion scaption  10 x *    Active range of motion abduction  10 x *    Bent over rows  2 x 10 *    Bent over shoulder extension  2 x 10 *    Wand flexion stretch  10 x 10"    Wand ER stretch  5 x 10"                   Hand Gripper   30 x 3" Green        Patient " Education and Home Exercises     Home Exercises Provided and Patient Education Provided     Education provided:   - Home exercise program, Plan of Care, Delayed onset muscle soreness     Written Home Exercises Provided: Patient instructed to cont prior HEP. Exercises were reviewed and LORRAINE was able to demonstrate them prior to the end of the session.  LORRAINE demonstrated good  understanding of the education provided. See EMR under Patient Instructions for exercises provided during therapy sessions    ASSESSMENT   Lorraine is a 68 y.o. female referred to outpatient Physical Therapy with a medical diagnosis of displaced L humerus fracture. Patient presents with L shoulder pain, decreased L shoulder ROM, and decreased L UE muscle strength and motor control. Patient's impairments are currently limiting her  functional use of L UE to complete ADLs.  LPTA added Therapeutic exercises and increased reps as tolerated by patient with mod cues to complete with proper alignment, posture, count, hold times, and speed of movement.  No adverse effects noted or reported.     Patient prognosis is Good.   Patient will benefit from skilled outpatient Physical Therapy to address the deficits stated above and in the chart below, provide patient /family education, and to maximize patientt's level of independence.      Plan of care discussed with patient: Yes  Patient's spiritual, cultural and educational needs considered and patient is agreeable to the plan of care and goals as stated below:      Anticipated Barriers for therapy: compliance with Home exercise program, guarding  Goals:  Short Term Goals: 2 weeks   Patient will independently complete Home exercise program with correct form.   Patient will report decreased L shoulder pain at rest to less than or equal to 2/10 for improved quality of life.      Long Term Goals: 4 weeks   Pt will increase L shoulder flexion to 120 degrees, external rotation to C4, and internal rotation to L2 for  independent dressing  Pt will increase L upper extremity to 4/5 to allow patient to perform activities of daily living independently  Pt will report decrease in L shoulder pain to 3/10 at worst to improve quality of life  Patient will have increased FOTO score to greater than or equal to 55% indicating increased function.    PLAN   Continue POC per PT orders to progress patient toward rehab goals.   Recert 3 x 4 weeks to begin week of 1/29/2024.   YOVANNY Rahman   2/5/2024

## 2024-02-07 ENCOUNTER — CLINICAL SUPPORT (OUTPATIENT)
Dept: REHABILITATION | Facility: HOSPITAL | Age: 69
End: 2024-02-07
Payer: MEDICARE

## 2024-02-07 DIAGNOSIS — M25.512 ACUTE PAIN OF LEFT SHOULDER: Primary | ICD-10-CM

## 2024-02-07 PROCEDURE — 97110 THERAPEUTIC EXERCISES: CPT

## 2024-02-07 PROCEDURE — 97530 THERAPEUTIC ACTIVITIES: CPT

## 2024-02-07 NOTE — PROGRESS NOTES
"[facility]   Physical Therapy Treatment Note     Name: Lorraine Balbuena [place]  Clinic Number: [id number]    Therapy Diagnosis:   Encounter Diagnosis   Name Primary?    Acute pain of left shoulder Yes       Physician: Abdirahman Gill MD    Visit Date: 2/7/2024    Physician Orders: PT Eval and Treat   Medical Diagnosis from Referral: displaced fracture of L humerus  Evaluation Date: 12/28/2023  Authorization Period Expiration: 11/26/2024  Plan of Care Expiration: 2/23/2024  Progress Note Due:2/23/2024  Date of Surgery: NA   Visit # / Visits authorized: 14/24  FOTO: to be completed on visit 13     Precautions: Standard and Fall      Time In: 11:01  Time Out: 11:36  Total Billable Time: 35 minutes     PTA Visit #: 0/5  SUBJECTIVE   Pt reports:  "I'm able to use it a little more, but I'm still having trouble picking my arm up like to put on deodorant and fixing my hair".     She was compliant with home exercise program.  Response to previous treatment: N/A  Functional change: increased use of L UE for ADLs    Pain: 3/10 with activity   Location: left shoulder    OBJECTIVE   Objective Measures updated at progress report unless specified.   Treatment   LORRAINE received the treatments listed below:      therapeutic exercises to develop strength, endurance, ROM, flexibility, and posture for 23 minutes including: see flowsheet below   Therapeutic activities to increase functional performance for 12 minutes including: See flowsheet below       Therapeutic-Exercise  Reps          Pulleys (flexion and ABD) 3 mins each    Finger Ladder (flexion and ABD) 5 x 5" each    Ball Rolls flexion and ABD  10 x 10" each on wall and table    Bicep Curls  15 x 2#   Bent over rows  2 x 10 * (not today)   Bent over shoulder extension  2 x 10 * (not today)   Wand flexion stretch  10 x 10"    Wand ER stretch  10 x 10"    Sidelying ER/IR  2 x 10 each *        Therapeutic Activities Reps    T-band Rows  2 x 10 red TB * "   T-band Extension  2 x 10 red TB *   I,Y,Ts  15 x each *   Sidelying PNF Patterns  10 x each *      Patient Education and Home Exercises     Home Exercises Provided and Patient Education Provided     Education provided:   - Home exercise program, Plan of Care, Delayed onset muscle soreness     Written Home Exercises Provided: Patient instructed to cont prior HEP. Exercises were reviewed and LORRAINE was able to demonstrate them prior to the end of the session.  LORRAINE demonstrated good  understanding of the education provided. See EMR under Patient Instructions for exercises provided during therapy sessions    ASSESSMENT   Lorraine is a 68 y.o. female referred to outpatient Physical Therapy with a medical diagnosis of displaced L humerus fracture. Patient presents with L shoulder pain, decreased L shoulder ROM, and decreased L UE muscle strength and motor control. Patient's impairments are currently limiting her functional use of L UE to complete ADLs. Pt initiated increased L shoulder and periscapular strengthening exercise to continue progressing toward patient's goals. PT provided visual, verbal, and tactile cueing for increased scapular stability and decreased compensations during exercises. Patient required occasional rests due to fatigue. No adverse effects noted to treatment tasks.     Patient prognosis is Good.   Patient will benefit from skilled outpatient Physical Therapy to address the deficits stated above and in the chart below, provide patient /family education, and to maximize patientt's level of independence.      Plan of care discussed with patient: Yes  Patient's spiritual, cultural and educational needs considered and patient is agreeable to the plan of care and goals as stated below:      Anticipated Barriers for therapy: compliance with Home exercise program, guarding  Goals:  Short Term Goals: 2 weeks   Patient will independently complete Home exercise program with correct form.   Patient will report  decreased L shoulder pain at rest to less than or equal to 2/10 for improved quality of life.      Long Term Goals: 4 weeks   Pt will increase L shoulder flexion to 120 degrees, external rotation to C4, and internal rotation to L2 for independent dressing  Pt will increase L upper extremity to 4/5 to allow patient to perform activities of daily living independently  Pt will report decrease in L shoulder pain to 3/10 at worst to improve quality of life  Patient will have increased FOTO score to greater than or equal to 55% indicating increased function.    PLAN   Continue POC per PT orders to progress patient toward rehab goals.   Mia Wheatley, PT, DPT   2/7/2024

## 2024-02-09 ENCOUNTER — CLINICAL SUPPORT (OUTPATIENT)
Dept: REHABILITATION | Facility: HOSPITAL | Age: 69
End: 2024-02-09
Payer: MEDICARE

## 2024-02-09 DIAGNOSIS — M25.512 ACUTE PAIN OF LEFT SHOULDER: Primary | ICD-10-CM

## 2024-02-09 PROCEDURE — 97530 THERAPEUTIC ACTIVITIES: CPT | Mod: CQ

## 2024-02-09 PROCEDURE — 97110 THERAPEUTIC EXERCISES: CPT | Mod: CQ

## 2024-02-09 NOTE — PROGRESS NOTES
"[facility]   Physical Therapy Treatment Note     Name: Lorraine Brambila  Clinic Number: [id number]    Therapy Diagnosis:   No diagnosis found.      Physician: Abdirahman Gill MD    Visit Date: 2/9/2024    Physician Orders: PT Eval and Treat   Medical Diagnosis from Referral: displaced fracture of L humerus  Evaluation Date: 12/28/2023  Authorization Period Expiration: 11/26/2024  Plan of Care Expiration: 2/23/2024  Progress Note Due:2/23/2024  Date of Surgery: NA   Visit # / Visits authorized: 15/24  FOTO: to be completed on visit 13     Precautions: Standard and Fall      Time In: 11:04  Time Out: 11:38  Total Billable Time:  34 minutes     PTA Visit #: 1/5  SUBJECTIVE   Pt reports:  "I think my shoulder is doing better.  Patient reports no new complaints.     She was compliant with home exercise program.  Response to previous treatment: N/A  Functional change: increased use of L UE for ADLs    Pain: 3/10 with activity   Location: left shoulder    OBJECTIVE   Objective Measures updated at progress report unless specified.   Treatment   LORRAINE received the treatments listed below:      therapeutic exercises to develop strength, endurance, ROM, flexibility, and posture for 23 minutes including: see flowsheet below   Therapeutic activities to increase functional performance for 12 minutes including: See flowsheet below       Therapeutic-Exercise  Reps          Pulleys (flexion and ABD) 3 mins each    Finger Ladder (flexion and ABD) 5 x 5" each    Ball Rolls flexion and ABD  10 x 10" each on wall and table    Bicep Curls  20  x 2# *   Bent over rows  2 x 10 *    Bent over shoulder extension  2 x 10 *    Wand flexion stretch  10 x 5" , 2# *    Wand ER stretch  10 x 10"    Sidelying ER/IR  2 x 10 each , 1# *        Therapeutic Activities Reps    T-band Rows  2 x 10 red TB    T-band Extension  2 x 10 red TB    I,Y,Ts  15 x each    Sidelying PNF Patterns  10 x each      Patient Education " and Home Exercises     Home Exercises Provided and Patient Education Provided     Education provided:   - Home exercise program, Plan of Care, Delayed onset muscle soreness     Written Home Exercises Provided: Patient instructed to cont prior HEP. Exercises were reviewed and LORRAINE was able to demonstrate them prior to the end of the session.  LORRAINE demonstrated good  understanding of the education provided. See EMR under Patient Instructions for exercises provided during therapy sessions    ASSESSMENT   Lorraine is a 68 y.o. female referred to outpatient Physical Therapy with a medical diagnosis of displaced L humerus fracture. Patient presents with L shoulder pain, decreased L shoulder ROM, and decreased L UE muscle strength and motor control. Patient's impairments are currently limiting her functional use of L UE to complete ADLs. Pt initiated increased L shoulder and periscapular strengthening exercise to continue progressing toward patient's goals. LPTA provided visual, verbal, and tactile cueing for increased scapular stability and decreased compensations during exercises. Patient required occasional rests due to muscle fatigue, but does not report complaints of pain. Patient demonstrate minimal shoulder hiking with active range of motion flexion, scaption, and abduction. No adverse effects noted to treatment tasks.     Patient prognosis is Good.   Patient will benefit from skilled outpatient Physical Therapy to address the deficits stated above and in the chart below, provide patient /family education, and to maximize patientt's level of independence.      Plan of care discussed with patient: Yes  Patient's spiritual, cultural and educational needs considered and patient is agreeable to the plan of care and goals as stated below:      Anticipated Barriers for therapy: compliance with Home exercise program, guarding  Goals:  Short Term Goals: 2 weeks   Patient will independently complete Home exercise program with  correct form.   Patient will report decreased L shoulder pain at rest to less than or equal to 2/10 for improved quality of life.      Long Term Goals: 4 weeks   Pt will increase L shoulder flexion to 120 degrees, external rotation to C4, and internal rotation to L2 for independent dressing  Pt will increase L upper extremity to 4/5 to allow patient to perform activities of daily living independently  Pt will report decrease in L shoulder pain to 3/10 at worst to improve quality of life  Patient will have increased FOTO score to greater than or equal to 55% indicating increased function.    PLAN   Continue POC per PT orders to progress patient toward rehab goals.   YOVANNY Rahman   2/9/2024

## 2024-02-12 ENCOUNTER — CLINICAL SUPPORT (OUTPATIENT)
Dept: REHABILITATION | Facility: HOSPITAL | Age: 69
End: 2024-02-12
Payer: MEDICARE

## 2024-02-12 DIAGNOSIS — M25.512 ACUTE PAIN OF LEFT SHOULDER: Primary | ICD-10-CM

## 2024-02-12 PROCEDURE — 97530 THERAPEUTIC ACTIVITIES: CPT

## 2024-02-12 PROCEDURE — 97110 THERAPEUTIC EXERCISES: CPT

## 2024-02-12 NOTE — PROGRESS NOTES
"[facility]   Physical Therapy Treatment Note     Name: Lorraine Brambila  Clinic Number: [id number]    Therapy Diagnosis:   Encounter Diagnosis   Name Primary?    Acute pain of left shoulder Yes         Physician: Abdirahman Gill MD    Visit Date: 2/12/2024    Physician Orders: PT Eval and Treat   Medical Diagnosis from Referral: displaced fracture of L humerus  Evaluation Date: 12/28/2023  Authorization Period Expiration: 11/26/2024  Plan of Care Expiration: 2/23/2024  Progress Note Due:2/23/2024  Date of Surgery: NA   Visit # / Visits authorized: 16/24  FOTO: to be completed on visit 13     Precautions: Standard and Fall      Time In: 11:04 (later check in time)   Time Out: 11:42  Total Billable Time: 38  minutes     PTA Visit #: 0/5  SUBJECTIVE   Pt reports:  "I think I may have slept on it a little weird."      She was compliant with home exercise program.  Response to previous treatment: N/A  Functional change: increased use of L UE for ADLs    Pain: 1/10 with activity   Location: left shoulder    OBJECTIVE   Objective Measures updated at progress report unless specified.   Treatment   LORRAINE received the treatments listed below:      therapeutic exercises to develop strength, endurance, ROM, flexibility, and posture for 23 minutes including: see flowsheet below   Therapeutic activities to increase functional performance for 12 minutes including: See flowsheet below    Therapeutic-Exercise  Reps          Pulleys (flexion and ABD) 3 mins each    Finger Ladder (flexion and ABD) 5 x 5" each    Ball Rolls flexion and ABD  10 x 10" each on wall and table    Bicep Curls  20  x 2#   Bent over rows  2 x 10    Bent over shoulder extension  2 x 10    Wand flexion stretch  10 x 5" , 2#    Wand ER stretch  10 x 10"    Sidelying ER/IR  2 x 10 each , 1#        Therapeutic Activities Reps    T-band Rows  2 x 10 red TB    T-band Extension  2 x 10 red TB    I,Y,Ts  2 x 10  each *   Standing PNF " Patterns  2 x 10 each (min A D2) *     Patient Education and Home Exercises     Home Exercises Provided and Patient Education Provided     Education provided:   - Home exercise program, Plan of Care, Delayed onset muscle soreness     Written Home Exercises Provided: Patient instructed to cont prior HEP. Exercises were reviewed and LORRAINE was able to demonstrate them prior to the end of the session.  LORRAINE demonstrated good  understanding of the education provided. See EMR under Patient Instructions for exercises provided during therapy sessions    ASSESSMENT   Lorraine is a 68 y.o. female referred to outpatient Physical Therapy with a medical diagnosis of displaced L humerus fracture. Patient presents with L shoulder pain, decreased L shoulder ROM, and decreased L UE muscle strength and motor control. Patient's impairments are currently limiting her functional use of L UE to complete ADLs. Pt initiated increased L shoulder and periscapular strengthening exercise to continue progressing toward patient's goals. Patient continues to require cueing from therapist for decreased compensations and decreased speed with exercises. PT also cued patient to increase scapular stability during exercises. Patient required min assist complete D2 PNF pattern in standing due to shoulder flexion weakness. Patient had no reports of adverse effects to treatment only muscle fatigue.     Patient prognosis is Good.   Patient will benefit from skilled outpatient Physical Therapy to address the deficits stated above and in the chart below, provide patient /family education, and to maximize patientt's level of independence.      Plan of care discussed with patient: Yes  Patient's spiritual, cultural and educational needs considered and patient is agreeable to the plan of care and goals as stated below:      Anticipated Barriers for therapy: compliance with Home exercise program, guarding  Goals:  Short Term Goals: 2 weeks   Patient will  independently complete Home exercise program with correct form.   Patient will report decreased L shoulder pain at rest to less than or equal to 2/10 for improved quality of life.      Long Term Goals: 4 weeks   Pt will increase L shoulder flexion to 120 degrees, external rotation to C4, and internal rotation to L2 for independent dressing  Pt will increase L upper extremity to 4/5 to allow patient to perform activities of daily living independently  Pt will report decrease in L shoulder pain to 3/10 at worst to improve quality of life  Patient will have increased FOTO score to greater than or equal to 55% indicating increased function.    PLAN   Continue POC per PT orders to progress patient toward rehab goals.   Mia Wheatley, PT, DPT     2/12/2024

## 2024-02-14 ENCOUNTER — CLINICAL SUPPORT (OUTPATIENT)
Dept: REHABILITATION | Facility: HOSPITAL | Age: 69
End: 2024-02-14
Payer: MEDICARE

## 2024-02-14 DIAGNOSIS — M25.512 ACUTE PAIN OF LEFT SHOULDER: Primary | ICD-10-CM

## 2024-02-14 PROCEDURE — 97110 THERAPEUTIC EXERCISES: CPT | Mod: CQ

## 2024-02-14 PROCEDURE — 97530 THERAPEUTIC ACTIVITIES: CPT | Mod: CQ

## 2024-02-14 NOTE — PROGRESS NOTES
"[facility]   Physical Therapy Treatment Note     Name: Lorraine Brambila  Clinic Number: [id number]    Therapy Diagnosis:   Encounter Diagnosis   Name Primary?    Acute pain of left shoulder Yes         Physician: Abdirahman Gill MD    Visit Date: 2/14/2024    Physician Orders: PT Eval and Treat   Medical Diagnosis from Referral: displaced fracture of L humerus  Evaluation Date: 12/28/2023  Authorization Period Expiration: 11/26/2024  Plan of Care Expiration: 2/23/2024  Progress Note Due:2/23/2024  Date of Surgery: NA   Visit # / Visits authorized: 17/24  FOTO: to be completed on visit 13     Precautions: Standard and Fall      Time In: 10:15   Time Out: 10:54  Total Billable Time: 39  minutes     PTA Visit #: 1/5  SUBJECTIVE   Pt reports:  "It's a little sore this morning."      She was compliant with home exercise program.  Response to previous treatment: N/A  Functional change: increased use of L UE for ADLs    Pain: 1/10 with activity   Location: left shoulder    OBJECTIVE   Objective Measures updated at progress report unless specified.   Treatment   LORRAINE received the treatments listed below:      therapeutic exercises to develop strength, endurance, ROM, flexibility, and posture for 23 minutes including: see flowsheet below   Therapeutic activities to increase functional performance for 16 minutes including: See flowsheet below    Therapeutic-Exercise  Reps          Pulleys (flexion and ABD) 3 mins each    Finger Ladder (flexion and ABD) 5 x 5" each    Ball Rolls flexion and ABD  10 x 10" each on wall and table    Bicep Curls  20  x 2#   Bent over rows  2 x 10    Bent over shoulder extension  2 x 10    Wand flexion stretch  10 x 5" , 2#    Wand ER stretch  10 x 10"    Sidelying ER/IR  2 x 10 each , 1#        Therapeutic Activities Reps    T-band Rows  2 x 10 red TB    T-band Extension  2 x 10 red TB    I,Y,Ts  2 x 10  each    Standing PNF Patterns  2 x 10 each (min A D2)  "     Patient Education and Home Exercises     Home Exercises Provided and Patient Education Provided     Education provided:   - Home exercise program, Plan of Care, Delayed onset muscle soreness     Written Home Exercises Provided: Patient instructed to cont prior HEP. Exercises were reviewed and LORRAINE was able to demonstrate them prior to the end of the session.  LORRAINE demonstrated good  understanding of the education provided. See EMR under Patient Instructions for exercises provided during therapy sessions    ASSESSMENT   Lorraine is a 68 y.o. female referred to outpatient Physical Therapy with a medical diagnosis of displaced L humerus fracture. Patient presents with L shoulder pain, decreased L shoulder ROM, and decreased L UE muscle strength and motor control. Patient's impairments are currently limiting her functional use of L UE to complete ADLs. Pt initiated increased L shoulder and periscapular strengthening exercise to continue progressing toward patient's goals. Pt continues to require verbal cueing for decreased compensations and decreased speed to improve muscle activation with exercises. Pt unable to complete side-lying ER with resistance. Pt continues to display weakness in periscapular musculature with minimal activation noted. No adverse effects noted.     Patient prognosis is Good.   Patient will benefit from skilled outpatient Physical Therapy to address the deficits stated above and in the chart below, provide patient /family education, and to maximize patientt's level of independence.      Plan of care discussed with patient: Yes  Patient's spiritual, cultural and educational needs considered and patient is agreeable to the plan of care and goals as stated below:      Anticipated Barriers for therapy: compliance with Home exercise program, guarding  Goals:  Short Term Goals: 2 weeks   Patient will independently complete Home exercise program with correct form.   Patient will report decreased L  shoulder pain at rest to less than or equal to 2/10 for improved quality of life.      Long Term Goals: 4 weeks   Pt will increase L shoulder flexion to 120 degrees, external rotation to C4, and internal rotation to L2 for independent dressing  Pt will increase L upper extremity to 4/5 to allow patient to perform activities of daily living independently  Pt will report decrease in L shoulder pain to 3/10 at worst to improve quality of life  Patient will have increased FOTO score to greater than or equal to 55% indicating increased function.    PLAN   Continue POC per PT orders to progress patient toward rehab goals.   YOVANNY Sanchez  2/14/2024

## 2024-02-15 DIAGNOSIS — G47.09 OTHER INSOMNIA: ICD-10-CM

## 2024-02-15 RX ORDER — MIRTAZAPINE 45 MG/1
45 TABLET, FILM COATED ORAL NIGHTLY
Qty: 90 TABLET | Refills: 0 | Status: SHIPPED | OUTPATIENT
Start: 2024-02-15 | End: 2024-04-29

## 2024-02-16 ENCOUNTER — CLINICAL SUPPORT (OUTPATIENT)
Dept: REHABILITATION | Facility: HOSPITAL | Age: 69
End: 2024-02-16
Payer: MEDICARE

## 2024-02-16 DIAGNOSIS — M25.512 ACUTE PAIN OF LEFT SHOULDER: Primary | ICD-10-CM

## 2024-02-16 PROCEDURE — 97110 THERAPEUTIC EXERCISES: CPT | Mod: CQ

## 2024-02-16 PROCEDURE — 97530 THERAPEUTIC ACTIVITIES: CPT | Mod: CQ

## 2024-02-16 RX ORDER — MIRTAZAPINE 45 MG/1
45 TABLET, ORALLY DISINTEGRATING ORAL NIGHTLY
Qty: 90 TABLET | Refills: 0 | Status: SHIPPED | OUTPATIENT
Start: 2024-02-16

## 2024-02-16 NOTE — PROGRESS NOTES
"[facility]   Physical Therapy Treatment Note     Name: Lorraine Brambila  Clinic Number: [id number]    Therapy Diagnosis:   Encounter Diagnosis   Name Primary?    Acute pain of left shoulder Yes         Physician: Abdirahman Gill MD    Visit Date: 2/16/2024    Physician Orders: PT Eval and Treat   Medical Diagnosis from Referral: displaced fracture of L humerus  Evaluation Date: 12/28/2023  Authorization Period Expiration: 11/26/2024  Plan of Care Expiration: 2/23/2024  Progress Note Due:2/23/2024  Date of Surgery: NA   Visit # / Visits authorized: 18/24  FOTO: to be completed on visit 13     Precautions: Standard and Fall      Time In: 11:04  Time Out:  11:42  Total Billable Time: 38 minutes     PTA Visit #: 2/5  SUBJECTIVE   Pt reports:  No new complaints, and states "I achieved something today.  I can reach my hand (L) behind my back".     She was compliant with home exercise program.  Response to previous treatment: N/A  Functional change: increased use of L UE for ADLs    Pain: 1/10 with activity   Location: left shoulder    OBJECTIVE   Objective Measures updated at progress report unless specified.   Treatment   LORRAINE received the treatments listed below:      therapeutic exercises to develop strength, endurance, ROM, flexibility, and posture for 22 minutes including: see flowsheet below     Therapeutic-Exercise  Reps          Pulleys (flexion and ABD) 3 mins each    Finger Ladder (flexion and ABD) 5 x 5" each    Ball Rolls flexion and ABD  10 x 10" each on wall and table    Bicep Curls  20  x 2#   Bent over rows  2 x 10    Bent over shoulder extension  2 x 10    Wand flexion stretch  10 x 5" , 2#    Wand ER stretch  10 x 10"    Sidelying ER/IR  2 x 10 each , 1#              Therapeutic activities to increase functional performance for 16 minutes including: See flowsheet below    Therapeutic Activities Reps    T-band Rows  2 x 10 red TB    T-band Extension  2 x 10 red TB  "   I,Y,Ts  2 x 10  each    Standing PNF Patterns  2 x 10 each (min A D2)      Patient Education and Home Exercises     Home Exercises Provided and Patient Education Provided     Education provided:   - Home exercise program, Plan of Care, Delayed onset muscle soreness     Written Home Exercises Provided: Patient instructed to cont prior HEP. Exercises were reviewed and LORRAINE was able to demonstrate them prior to the end of the session.  LORRAINE demonstrated good  understanding of the education provided. See EMR under Patient Instructions for exercises provided during therapy sessions    ASSESSMENT   Lorraine is a 68 y.o. female referred to outpatient Physical Therapy with a medical diagnosis of displaced L humerus fracture. Patient presents with L shoulder pain, decreased L shoulder ROM, and decreased L UE muscle strength and motor control. Patient's impairments are currently limiting her functional use of L UE to complete ADLs. Pt initiated increased L shoulder and periscapular strengthening exercise to continue progressing toward patient's goals. Pt continues to require verbal cueing for decreased compensations and decreased speed to improve muscle activation with exercises.  Noted improved functional reach IR at L2 without moderate difficulty noted. No adverse effects noted during physical therapy treatment session.     Patient prognosis is Good.   Patient will benefit from skilled outpatient Physical Therapy to address the deficits stated above and in the chart below, provide patient /family education, and to maximize patientt's level of independence.      Plan of care discussed with patient: Yes  Patient's spiritual, cultural and educational needs considered and patient is agreeable to the plan of care and goals as stated below:      Anticipated Barriers for therapy: compliance with Home exercise program, guarding  Goals:  Short Term Goals: 2 weeks   Patient will independently complete Home exercise program with  correct form.   Patient will report decreased L shoulder pain at rest to less than or equal to 2/10 for improved quality of life.      Long Term Goals: 4 weeks   Pt will increase L shoulder flexion to 120 degrees, external rotation to C4, and internal rotation to L2 for independent dressing  Pt will increase L upper extremity to 4/5 to allow patient to perform activities of daily living independently  Pt will report decrease in L shoulder pain to 3/10 at worst to improve quality of life  Patient will have increased FOTO score to greater than or equal to 55% indicating increased function.    PLAN   Continue POC per PT orders to progress patient toward rehab goals.   YOVANNY Rahman   2/16/2024

## 2024-02-19 ENCOUNTER — CLINICAL SUPPORT (OUTPATIENT)
Dept: REHABILITATION | Facility: HOSPITAL | Age: 69
End: 2024-02-19
Payer: MEDICARE

## 2024-02-19 DIAGNOSIS — M25.512 ACUTE PAIN OF LEFT SHOULDER: Primary | ICD-10-CM

## 2024-02-19 PROCEDURE — 97110 THERAPEUTIC EXERCISES: CPT | Mod: CQ

## 2024-02-19 PROCEDURE — 97530 THERAPEUTIC ACTIVITIES: CPT | Mod: CQ

## 2024-02-19 NOTE — PROGRESS NOTES
"[facility]   Physical Therapy Treatment Note     Name: Lorraine Brambila  Clinic Number: [id number]    Therapy Diagnosis:   Encounter Diagnosis   Name Primary?    Acute pain of left shoulder Yes         Physician: Abdirahman Gill MD    Visit Date: 2/19/2024    Physician Orders: PT Eval and Treat   Medical Diagnosis from Referral: displaced fracture of L humerus  Evaluation Date: 12/28/2023  Authorization Period Expiration: 11/26/2024  Plan of Care Expiration: 2/23/2024  Progress Note Due:2/23/2024  Date of Surgery: NA   Visit # / Visits authorized: 19/24  FOTO: to be completed on visit 13     Precautions: Standard and Fall      Time In: 11:06  Time Out:  11:35  Total Billable Time: 29 minutes     PTA Visit #: 2/5  SUBJECTIVE   Pt reports:  No new complaints.     She was compliant with home exercise program.  Response to previous treatment: N/A  Functional change: increased use of L UE for ADLs    Pain: 1/10 with activity   Location: left shoulder    OBJECTIVE   Objective Measures updated at progress report unless specified.   Treatment   LORRAINE received the treatments listed below:      therapeutic exercises to develop strength, endurance, ROM, flexibility, and posture for 22 minutes including: see flowsheet below     Therapeutic-Exercise  Reps          Pulleys (flexion and ABD) 3 mins each    Finger Ladder (flexion and ABD) 5 x 5" each    Ball Rolls flexion and ABD  10 x 10" each on wall and table    Bicep Curls  20  x 2#   Bent over rows  2 x 10    Bent over shoulder extension  2 x 10    Wand flexion stretch  10 x 5" , 2#    Wand ER stretch  10 x 10"    Sidelying ER/IR  2 x 10 each , 1#              Therapeutic activities to increase functional performance for 16 minutes including: See flowsheet below    Therapeutic Activities Reps    T-band Rows  2 x 10 red TB    T-band Extension  2 x 10 red TB    I,Y,Ts  2 x 10  each    Standing PNF Patterns  2 x 10 each (min A D2)      Patient " Education and Home Exercises     Home Exercises Provided and Patient Education Provided     Education provided:   - Home exercise program, Plan of Care, Delayed onset muscle soreness     Written Home Exercises Provided: Patient instructed to cont prior HEP. Exercises were reviewed and LORRAINE was able to demonstrate them prior to the end of the session.  LORRAINE demonstrated good  understanding of the education provided. See EMR under Patient Instructions for exercises provided during therapy sessions    ASSESSMENT   Lorraine is a 68 y.o. female referred to outpatient Physical Therapy with a medical diagnosis of displaced L humerus fracture. Patient presents with L shoulder pain, decreased L shoulder ROM, and decreased L UE muscle strength and motor control. Patient's impairments are currently limiting her functional use of L UE to complete ADLs. Pt with improved functional movement with all exercises. LPTA prompted pt with cueing for proper form, decreased compensation and decreased speed of movement.  Pt expresses continued improvement with ADLs. No adverse effects noted.    Patient prognosis is Good.   Patient will benefit from skilled outpatient Physical Therapy to address the deficits stated above and in the chart below, provide patient /family education, and to maximize patientt's level of independence.      Plan of care discussed with patient: Yes  Patient's spiritual, cultural and educational needs considered and patient is agreeable to the plan of care and goals as stated below:      Anticipated Barriers for therapy: compliance with Home exercise program, guarding  Goals:  Short Term Goals: 2 weeks   Patient will independently complete Home exercise program with correct form.   Patient will report decreased L shoulder pain at rest to less than or equal to 2/10 for improved quality of life.      Long Term Goals: 4 weeks   Pt will increase L shoulder flexion to 120 degrees, external rotation to C4, and internal  rotation to L2 for independent dressing  Pt will increase L upper extremity to 4/5 to allow patient to perform activities of daily living independently  Pt will report decrease in L shoulder pain to 3/10 at worst to improve quality of life  Patient will have increased FOTO score to greater than or equal to 55% indicating increased function.    PLAN   Continue POC per PT orders to progress patient toward rehab goals.   YOVANNY Sanchez   2/19/2024

## 2024-02-21 ENCOUNTER — CLINICAL SUPPORT (OUTPATIENT)
Dept: REHABILITATION | Facility: HOSPITAL | Age: 69
End: 2024-02-21
Payer: MEDICARE

## 2024-02-21 DIAGNOSIS — S42.212D CLOSED DISPLACED FRACTURE OF SURGICAL NECK OF LEFT HUMERUS WITH ROUTINE HEALING, UNSPECIFIED FRACTURE MORPHOLOGY, SUBSEQUENT ENCOUNTER: Primary | ICD-10-CM

## 2024-02-21 PROCEDURE — 97110 THERAPEUTIC EXERCISES: CPT | Mod: CQ

## 2024-02-21 PROCEDURE — 97530 THERAPEUTIC ACTIVITIES: CPT | Mod: CQ

## 2024-02-21 NOTE — PROGRESS NOTES
"[facility]   Physical Therapy Treatment Note     Name: Lorraine Brambila  Clinic Number: [id number]    Therapy Diagnosis:   No diagnosis found.        Physician: Abdirahman Gill MD    Visit Date: 2/21/2024    Physician Orders: PT Eval and Treat   Medical Diagnosis from Referral: displaced fracture of L humerus  Evaluation Date: 12/28/2023  Authorization Period Expiration: 11/26/2024  Plan of Care Expiration: 2/23/2024  Progress Note Due:2/23/2024  Date of Surgery: NA   Visit # / Visits authorized: 20/24  FOTO: to be completed on visit 13     Precautions: Standard and Fall      Time In: 11:10  Time Out:  11:48  Total Billable Time: 38 minutes     PTA Visit #: 3/5  SUBJECTIVE   Pt reports:  "My shoulder is getting better.  I can use it more than I was have been.  I have the most trouble reaching up now".     She was compliant with home exercise program.  Response to previous treatment: N/A  Functional change: increased use of L UE for ADLs    Pain: 1/10 with activity   Location: left shoulder    OBJECTIVE   Objective Measures updated at progress report unless specified.   Treatment   LORRAINE received the treatments listed below:      therapeutic exercises to develop strength, endurance, ROM, flexibility, and posture.  See flow-sheet below:     Therapeutic-Exercise  Reps          Pulleys (flexion and ABD) 3 mins each    Finger Ladder (flexion and ABD) 5 x 5" each    Ball Rolls flexion and ABD  10 x 10" each on wall and table    Bicep Curls  20  x 2#   Bent over rows  2 x 10    Bent over shoulder extension  2 x 10    Wand flexion stretch  10 x 5" , 2#    Wand ER stretch  10 x 10"    Sidelying ER/IR  2 x 10 each , 1#              Therapeutic activities to increase functional performance for 16 minutes including: See flowsheet below    Therapeutic Activities Reps    T-band Rows  2 x 10 red TB    T-band Extension  2 x 10 red TB    I,Y,Ts  2 x 10  each    Standing PNF Patterns  2 x 10 each " (min A D2)      Patient Education and Home Exercises     Home Exercises Provided and Patient Education Provided     Education provided:   - Home exercise program, Plan of Care, Delayed onset muscle soreness     Written Home Exercises Provided: Patient instructed to cont prior HEP. Exercises were reviewed and LORRAINE was able to demonstrate them prior to the end of the session.  LORRAINE demonstrated good  understanding of the education provided. See EMR under Patient Instructions for exercises provided during therapy sessions    ASSESSMENT   Lorraine is a 68 y.o. female referred to outpatient Physical Therapy with a medical diagnosis of displaced L humerus fracture. Patient presents with L shoulder pain, decreased L shoulder ROM, and decreased L UE muscle strength and motor control. Patient's impairments are currently limiting her functional use of L UE to complete ADLs. Patient requires mod cues to decrease compensation during completion of active range of motion and PNF patterns of L UE.  Patient does not report pain during treatment session but does verbalizes complaints of feeling weakness in Left shoulder and arm.     Patient prognosis is Good.   Patient will benefit from skilled outpatient Physical Therapy to address the deficits stated above and in the chart below, provide patient /family education, and to maximize patientt's level of independence.      Plan of care discussed with patient: Yes  Patient's spiritual, cultural and educational needs considered and patient is agreeable to the plan of care and goals as stated below:      Anticipated Barriers for therapy: compliance with Home exercise program, guarding  Goals:  Short Term Goals: 2 weeks   Patient will independently complete Home exercise program with correct form.   Patient will report decreased L shoulder pain at rest to less than or equal to 2/10 for improved quality of life.      Long Term Goals: 4 weeks   Pt will increase L shoulder flexion to 120  degrees, external rotation to C4, and internal rotation to L2 for independent dressing  Pt will increase L upper extremity to 4/5 to allow patient to perform activities of daily living independently  Pt will report decrease in L shoulder pain to 3/10 at worst to improve quality of life  Patient will have increased FOTO score to greater than or equal to 55% indicating increased function.    PLAN   Continue POC per PT orders to progress patient toward rehab goals.   YOVANNY Rahman   2/21/2024

## 2024-02-23 ENCOUNTER — CLINICAL SUPPORT (OUTPATIENT)
Dept: REHABILITATION | Facility: HOSPITAL | Age: 69
End: 2024-02-23
Payer: MEDICARE

## 2024-02-23 DIAGNOSIS — M25.512 ACUTE PAIN OF LEFT SHOULDER: Primary | ICD-10-CM

## 2024-02-23 PROCEDURE — 97110 THERAPEUTIC EXERCISES: CPT

## 2024-02-23 PROCEDURE — 97530 THERAPEUTIC ACTIVITIES: CPT | Mod: KX

## 2024-02-23 NOTE — PLAN OF CARE
[facility]  Physical Therapy Plan of Care Note     Name: Lorraine Brambila  Clinic Number: [id number]    Therapy Diagnosis:   Encounter Diagnosis   Name Primary?    Acute pain of left shoulder Yes     Physician: Abdirahman Gill MD    Visit Date: 2/23/2024      Physician Orders: PT Eval and Treat   Medical Diagnosis from Referral: displaced fracture of L humerus  Evaluation Date: 12/28/2023  Authorization Period Expiration: 11/26/2024  Plan of Care Expiration: 3/22/2024  Progress Note Due: 3/22/2024  Date of Surgery: NA   Visit # / Visits authorized: 21/32  FOTO: to be completed on visit 27    Precautions: Standard  Functional Level Prior to Evaluation:  independent     Subjective     Update: Independent    Objective      Update: Patient continues to report increased L shoulder pain to 5/10 when she is trying to complete household tasks resulting in the need for frequent rest breaks.     Assessment     Update: Patient demonstrates increased L shoulder ROM and improving L shoulder strength. She can benefit from continued skilled PT services to continue progressing toward rehab goals to promote return to PLOF with less pain.     Previous Short Term Goals Status:     Patient will independently complete Home exercise program with correct form. -MET  Patient will report decreased L shoulder pain at rest to less than or equal to 2/10 for improved quality of life. -MET   Long Term Goal Status: continue per initial plan of care.  Reasons for Recertification of Therapy:   continue progressing toward rehab goals to promote increased functional activity tolerance with less pain    GOALS  Pt will increase L shoulder flexion to 120 degrees, external rotation to C4, and internal rotation to L2 for independent dressing-MET   Pt will increase L upper extremity to 4/5 to allow patient to perform activities of daily living independently-Goal in progress   Pt will report decrease in L shoulder pain to  3/10 at worst to improve quality of life-Goal in progress 5/10  Patient will have increased FOTO score to greater than or equal to 55% indicating increased function. -MET 56%    Plan     Updated Certification Period: 2/23/2024 to 3/22/2024   Recommended Treatment Plan: 2 times per week for 4 weeks:  Electrical Stimulation unattended, Manual Therapy, Moist Heat/ Ice, Neuromuscular Re-ed, Patient Education, Therapeutic Activities, Therapeutic Exercise, and Ultrasound      Mia Wheatley, PT, DPT

## 2024-02-23 NOTE — PROGRESS NOTES
"[facility]   Physical Therapy Treatment Note     Name: Lorraine Brownty  Clinic Number: [id number]    Therapy Diagnosis:   Encounter Diagnosis   Name Primary?    Acute pain of left shoulder Yes       Physician: Abdirahman Gill MD    Visit Date: 2/23/2024    Physician Orders: PT Eval and Treat   Medical Diagnosis from Referral: displaced fracture of L humerus  Evaluation Date: 12/28/2023  Authorization Period Expiration: 11/26/2024  Plan of Care Expiration: 2/23/2024  Progress Note Due:2/23/2024  Date of Surgery: NA   Visit # / Visits authorized: 21/32  FOTO: to be completed on visit 13     Precautions: Standard and Fall      Time In: 11:05   Time Out:  11:40  Total Billable Time: 35 minutes     PTA Visit #: 0/5  SUBJECTIVE   Pt reports:  "I still get sore and hurt a little if I do too much. It's definitely better than when I started."     She was compliant with home exercise program.  Response to previous treatment: N/A  Functional change: increased use of L UE for ADLs    Pain: 1/10 with activity   Location: left shoulder    OBJECTIVE   Objective Measures updated at progress report unless specified.   Treatment   LORRAINE received the treatments listed below:      therapeutic exercises to develop strength, endurance, ROM, flexibility, and posture for 19 minutes.  See flow-sheet below:     Therapeutic-Exercise  Reps          Pulleys (flexion and ABD) 3 mins each    Finger Ladder (flexion and ABD) 5 x 5" each    Ball Rolls flexion and ABD  10 x 10" each on wall and table    Bicep Curls  20  x 2#   Bent over rows  2 x 10    Bent over shoulder extension  2 x 10    Wand flexion stretch  10 x 5" , 2#    Wand ER stretch  10 x 10"    Sidelying ER/IR  2 x 10 each , 1#    Table ER stretch         Therapeutic activities to increase functional performance for 16 minutes including: See flowsheet below    Therapeutic Activities Reps    T-band Rows  2 x 10 red TB    T-band Extension  2 x 10 red TB  "   I,Y,Ts  2 x 10  each    Standing PNF Patterns  2 x 10 each (min A D2)      Patient Education and Home Exercises     Home Exercises Provided and Patient Education Provided     Education provided:   - Home exercise program, Plan of Care, Delayed onset muscle soreness     Written Home Exercises Provided: Patient instructed to cont prior HEP. Exercises were reviewed and LORRAINE was able to demonstrate them prior to the end of the session.  LORRAINE demonstrated good  understanding of the education provided. See EMR under Patient Instructions for exercises provided during therapy sessions    ASSESSMENT   Lorraine is a 68 y.o. female referred to outpatient Physical Therapy with a medical diagnosis of displaced L humerus fracture. Patient presents with L shoulder pain, decreased L shoulder ROM, and decreased L UE muscle strength and motor control. Patient's impairments are currently limiting her functional use of L UE to complete ADLs. Patient continues to require moderate cueing for decreased speed, decreased compensations, and increased scapular stability to increase effectiveness of exercises. Patient attempts to move quickly through stretches requiring cueing to increase hold times. PT educated patient on importance of prolonged stretching to increase L shoulder ROM. PT completed patient goal assessment for last scheduled visit. Patient continued to have decreased L shoulder and scapular muscle strength. Patient could benefit from continued skilled PT services to continue addressing remaining deficits to promote increased activity tolerance to ADLs.     Patient prognosis is Good.   Patient will benefit from skilled outpatient Physical Therapy to address the deficits stated above and in the chart below, provide patient /family education, and to maximize patientt's level of independence.      Plan of care discussed with patient: Yes  Patient's spiritual, cultural and educational needs considered and patient is agreeable to the  plan of care and goals as stated below:      Anticipated Barriers for therapy: compliance with Home exercise program, guarding  Goals:  Short Term Goals: 2 weeks   Patient will independently complete Home exercise program with correct form.   Patient will report decreased L shoulder pain at rest to less than or equal to 2/10 for improved quality of life.      Long Term Goals: 4 weeks   Pt will increase L shoulder flexion to 120 degrees, external rotation to C4, and internal rotation to L2 for independent dressing  Pt will increase L upper extremity to 4/5 to allow patient to perform activities of daily living independently  Pt will report decrease in L shoulder pain to 3/10 at worst to improve quality of life  Patient will have increased FOTO score to greater than or equal to 55% indicating increased function.    PLAN   Plan of care Certification: 12/28/2023 to 3/22/2024.      Outpatient Physical Therapy 2 times weekly for 4 weeks to include the following interventions: Electrical Stimulation unattended, Manual Therapy, Moist Heat/ Ice, Patient Education, Therapeutic Activities, Therapeutic Exercise, and Ultrasound.     Continue POC per PT orders to progress patient toward rehab goals.   Recert 2 times per week for 4 weeks to continue progressing toward rehab goals.   Mia Wheatley, PT, DPT      2/23/2024

## 2024-02-27 ENCOUNTER — CLINICAL SUPPORT (OUTPATIENT)
Dept: REHABILITATION | Facility: HOSPITAL | Age: 69
End: 2024-02-27
Payer: MEDICARE

## 2024-02-27 DIAGNOSIS — M25.512 ACUTE PAIN OF LEFT SHOULDER: Primary | ICD-10-CM

## 2024-02-27 PROCEDURE — 97110 THERAPEUTIC EXERCISES: CPT | Mod: CQ

## 2024-02-27 PROCEDURE — 97530 THERAPEUTIC ACTIVITIES: CPT | Mod: CQ

## 2024-02-27 NOTE — PROGRESS NOTES
"[facility]   Physical Therapy Treatment Note     Name: Lorraine Brambila  Clinic Number: [id number]    Therapy Diagnosis:   Encounter Diagnosis   Name Primary?    Acute pain of left shoulder Yes       Physician: Abdirahman Gill MD    Visit Date: 2/27/2024    Physician Orders: PT Eval and Treat   Medical Diagnosis from Referral: displaced fracture of L humerus  Evaluation Date: 12/28/2023  Authorization Period Expiration: 11/26/2024  Plan of Care Expiration: 2/23/2024  Progress Note Due:2/23/2024  Date of Surgery: NA   Visit # / Visits authorized: 22/32  FOTO: to be completed on visit 26     Precautions: Standard and Fall      Time In: 14:40  Time Out:  15:20  Total Billable Time: 40 minutes     PTA Visit #: 1/5  SUBJECTIVE   Pt reports: "My shoulder is doing good.  It hurts sometimes and I still can't quite reach up with it like I need to".       She was compliant with home exercise program.  Response to previous treatment: N/A  Functional change: increased use of L UE for ADLs    Pain: 1/10 with activity   Location: left shoulder    OBJECTIVE   Objective Measures updated at progress report unless specified.   Treatment   LORRAINE received the treatments listed below:      therapeutic exercises to develop strength, endurance, ROM, flexibility, and posture. See flow-sheet below:     Therapeutic-Exercise  Reps          Pulleys (flexion and ABD) 3 mins each    Finger Ladder (flexion and ABD) 5 x 5" each    Ball Rolls flexion and ABD  10 x 10" each on wall and table    Bicep Curls  20  x 2#   Bent over rows  2 x 10, 2# *    Bent over shoulder extension  2 x 10, 2# *    Wand flexion stretch  10 x 5" , 2#    Wand ER stretch  10 x 10"    Sidelying ER/IR  2 x 10 each , 1#    Table ER stretch         Therapeutic activities to increase functional performance for 16 minutes including: See flowsheet below    Therapeutic Activities Reps    T-band Rows  2 x 10 red TB    T-band Extension  2 x 10 red " TB    I,Y,Ts  2 x 10  each    Standing PNF Patterns  2 x 10 each (min A D2)      Patient Education and Home Exercises     Home Exercises Provided and Patient Education Provided     Education provided:   - Home exercise program, Plan of Care, Delayed onset muscle soreness     Written Home Exercises Provided: Patient instructed to cont prior HEP. Exercises were reviewed and LORRAINE was able to demonstrate them prior to the end of the session.  LORRAINE demonstrated good  understanding of the education provided. See EMR under Patient Instructions for exercises provided during therapy sessions    ASSESSMENT   Lorraine is a 68 y.o. female referred to outpatient Physical Therapy with a medical diagnosis of displaced L humerus fracture. Patient presents with L shoulder pain, decreased L shoulder ROM, and decreased L UE muscle strength and motor control. Patient's impairments are currently limiting her functional use of L UE to complete ADLs. Patient continues to require moderate cueing for decreased speed, decreased compensations, and increased scapular stability to increase effectiveness of exercises. Patient attempts to move quickly through stretches requiring cueing to increase hold times.  LPTA able to complete added resistance with expected reports of muscle fatigue but without adverse effects noted.  Min shoulder hiking and compensation noted with PNF patterns. No adverse effects noted or reported to treatment.     Patient prognosis is Good.   Patient will benefit from skilled outpatient Physical Therapy to address the deficits stated above and in the chart below, provide patient /family education, and to maximize patientt's level of independence.      Plan of care discussed with patient: Yes  Patient's spiritual, cultural and educational needs considered and patient is agreeable to the plan of care and goals as stated below:      Anticipated Barriers for therapy: compliance with Home exercise program,  guarding  Goals:  Short Term Goals: 2 weeks   Patient will independently complete Home exercise program with correct form.   Patient will report decreased L shoulder pain at rest to less than or equal to 2/10 for improved quality of life.      Long Term Goals: 4 weeks   Pt will increase L shoulder flexion to 120 degrees, external rotation to C4, and internal rotation to L2 for independent dressing  Pt will increase L upper extremity to 4/5 to allow patient to perform activities of daily living independently  Pt will report decrease in L shoulder pain to 3/10 at worst to improve quality of life  Patient will have increased FOTO score to greater than or equal to 55% indicating increased function.    PLAN   Plan of care Certification: 12/28/2023 to 3/22/2024.      Outpatient Physical Therapy 2 times weekly for 4 weeks to include the following interventions: Electrical Stimulation unattended, Manual Therapy, Moist Heat/ Ice, Patient Education, Therapeutic Activities, Therapeutic Exercise, and Ultrasound.     Continue POC per PT orders to progress patient toward rehab goals.   Recert 2 times per week for 4 weeks to continue progressing toward rehab goals.   YOVANNY Rahman      2/27/2024

## 2024-02-29 PROBLEM — M79.602 LEFT ARM PAIN: Chronic | Status: ACTIVE | Noted: 2024-02-29

## 2024-02-29 NOTE — PROGRESS NOTES
Subjective:         Patient ID: Lorraine Brambila is a 68 y.o. female.    Chief Complaint: Shoulder Pain (left) and Hip Pain (right)      Pain  This is a chronic problem. The current episode started more than 1 year ago. The problem occurs daily. The problem has been unchanged. Associated symptoms include arthralgias and nausea. Pertinent negatives include no anorexia, change in bowel habit, chest pain, chills, coughing, diaphoresis, fever, neck pain, rash, sore throat, urinary symptoms, vertigo or vomiting.     Review of Systems   Constitutional:  Negative for activity change, appetite change, chills, diaphoresis, fever and unexpected weight change.   HENT:  Negative for drooling, ear discharge, ear pain, facial swelling, nosebleeds, sore throat, trouble swallowing, voice change and goiter.    Eyes:  Negative for photophobia, pain, discharge, redness and visual disturbance.   Respiratory:  Negative for apnea, cough, choking, chest tightness, shortness of breath, wheezing and stridor.    Cardiovascular:  Negative for chest pain, palpitations and leg swelling.   Gastrointestinal:  Positive for nausea. Negative for abdominal distention, anorexia, change in bowel habit, diarrhea, rectal pain, vomiting and fecal incontinence.   Endocrine: Negative for cold intolerance, heat intolerance, polydipsia, polyphagia and polyuria.   Genitourinary:  Negative for bladder incontinence, dysuria, flank pain, frequency and hot flashes.   Musculoskeletal:  Positive for arthralgias, back pain and leg pain. Negative for neck pain.   Integumentary:  Negative for color change, pallor and rash.   Allergic/Immunologic: Negative for immunocompromised state.   Neurological:  Negative for dizziness, vertigo, seizures, syncope, facial asymmetry, speech difficulty, light-headedness, memory loss and coordination difficulties.   Hematological:  Negative for adenopathy. Does not bruise/bleed easily.   Psychiatric/Behavioral:  Negative for  "agitation, behavioral problems, confusion, decreased concentration, dysphoric mood, hallucinations, self-injury and suicidal ideas. The patient is not nervous/anxious and is not hyperactive.            Past Medical History:   Diagnosis Date    COPD (chronic obstructive pulmonary disease)     Coronary artery disease     Fibromyalgia     Hyperlipidemia     Hypertension      Past Surgical History:   Procedure Laterality Date    CARDIAC CATHETERIZATION      HERNIA REPAIR      HYSTERECTOMY      OOPHORECTOMY       Social History     Socioeconomic History    Marital status:    Tobacco Use    Smoking status: Former     Current packs/day: 0.00     Average packs/day: 1 pack/day for 50.0 years (50.0 ttl pk-yrs)     Types: Cigarettes     Start date: 1969     Quit date: 2019     Years since quittin.9    Smokeless tobacco: Never   Substance and Sexual Activity    Alcohol use: Never    Drug use: Never    Sexual activity: Not Currently     Social Determinants of Health     Physical Activity: Sufficiently Active (2023)    Exercise Vital Sign     Days of Exercise per Week: 7 days     Minutes of Exercise per Session: 60 min     Family History   Problem Relation Age of Onset    Heart attack Father     Heart disease Father      Review of patient's allergies indicates:  No Known Allergies     Objective:  Vitals:    24 1330   BP: (!) 142/56   Pulse: 71   Resp: 18   Weight: 38.1 kg (84 lb)   Height: 5' 6" (1.676 m)   PainSc:   5         Physical Exam  Vitals and nursing note reviewed. Exam conducted with a chaperone present.   Constitutional:       General: She is awake. She is not in acute distress.     Appearance: Normal appearance. She is not ill-appearing, toxic-appearing or diaphoretic.   HENT:      Head: Normocephalic and atraumatic.      Nose: Nose normal.      Mouth/Throat:      Mouth: Mucous membranes are moist.      Pharynx: Oropharynx is clear.   Eyes:      Conjunctiva/sclera: Conjunctivae normal.     "  Pupils: Pupils are equal, round, and reactive to light.   Cardiovascular:      Rate and Rhythm: Normal rate.   Pulmonary:      Effort: Pulmonary effort is normal. No respiratory distress.   Abdominal:      Palpations: Abdomen is soft.      Tenderness: There is no guarding.   Musculoskeletal:         General: Normal range of motion.      Cervical back: Normal range of motion and neck supple. No rigidity.   Skin:     General: Skin is warm and dry.      Coloration: Skin is not jaundiced or pale.   Neurological:      General: No focal deficit present.      Mental Status: She is alert and oriented to person, place, and time. Mental status is at baseline.      Cranial Nerves: No cranial nerve deficit (II-XII).   Psychiatric:         Mood and Affect: Mood normal.         Behavior: Behavior normal. Behavior is cooperative.         Thought Content: Thought content normal.           X-Ray Shoulder 2 or More Views Left  See Procedure Notes for results.     IMPRESSION: Please see Ortho procedure notes for report.      This procedure was auto-finalized by: Virtual Radiologist       Office Visit on 01/10/2024   Component Date Value Ref Range Status    POC Amphetamines 01/10/2024 Negative  Negative, Inconclusive Final    POC Barbiturates 01/10/2024 Negative  Negative, Inconclusive Final    POC Benzodiazepines 01/10/2024 Negative  Negative, Inconclusive Final    POC Cocaine 01/10/2024 Negative  Negative, Inconclusive Final    POC THC 01/10/2024 Negative  Negative, Inconclusive Final    POC Methadone 01/10/2024 Negative  Negative, Inconclusive Final    POC Methamphetamine 01/10/2024 Negative  Negative, Inconclusive Final    POC Opiates 01/10/2024 Negative  Negative, Inconclusive Final    POC Oxycodone 01/10/2024 Negative  Negative, Inconclusive Final    POC Phencyclidine 01/10/2024 Negative  Negative, Inconclusive Final    POC Methylenedioxymethamphetamine * 01/10/2024 Negative  Negative, Inconclusive Final    POC Tricyclic  Antidepressants 01/10/2024 Negative  Negative, Inconclusive Final    POC Buprenorphine 01/10/2024 Negative   Final     Acceptable 01/10/2024 Yes   Final    POC Temperature (Urine) 01/10/2024 92   Final   Admission on 12/02/2023, Discharged on 12/02/2023   Component Date Value Ref Range Status    Sodium 12/02/2023 141  136 - 145 mmol/L Final    Potassium 12/02/2023 3.7  3.5 - 5.1 mmol/L Final    Chloride 12/02/2023 101  98 - 107 mmol/L Final    CO2 12/02/2023 33 (H)  21 - 32 mmol/L Final    Anion Gap 12/02/2023 11  7 - 16 mmol/L Final    Glucose 12/02/2023 116 (H)  74 - 106 mg/dL Final    BUN 12/02/2023 14  7 - 18 mg/dL Final    Creatinine 12/02/2023 0.74  0.55 - 1.02 mg/dL Final    BUN/Creatinine Ratio 12/02/2023 19  6 - 20 Final    Calcium 12/02/2023 10.8 (H)  8.5 - 10.1 mg/dL Final    Total Protein 12/02/2023 7.6  6.4 - 8.2 g/dL Final    Albumin 12/02/2023 4.0  3.5 - 5.0 g/dL Final    Globulin 12/02/2023 3.6  2.0 - 4.0 g/dL Final    A/G Ratio 12/02/2023 1.1   Final    Bilirubin, Total 12/02/2023 0.3  >0.0 - 1.2 mg/dL Final    Alk Phos 12/02/2023 90  55 - 142 U/L Final    ALT 12/02/2023 12 (L)  13 - 56 U/L Final    AST 12/02/2023 17  15 - 37 U/L Final    eGFR 12/02/2023 88  >=60 mL/min/1.73m2 Final    PT 12/02/2023 12.9  11.7 - 14.7 seconds Final    INR 12/02/2023 0.98  <=3.30 Final    TSH 12/02/2023 0.433  0.358 - 3.740 uIU/mL Final    Triglycerides 12/02/2023 57  35 - 150 mg/dL Final    Cholesterol 12/02/2023 174  0 - 200 mg/dL Final    HDL Cholesterol 12/02/2023 81 (H)  40 - 60 mg/dL Final    Cholesterol/HDL Ratio (Risk Factor) 12/02/2023 2.1   Final    Non-HDL 12/02/2023 93  mg/dL Final    LDL Calculated 12/02/2023 82  mg/dL Final    LDL/HDL 12/02/2023 1.0   Final    VLDL 12/02/2023 11  mg/dL Final    Troponin I High Sensitivity 12/02/2023 31.9  <=60.4 pg/mL Final    ProBNP 12/02/2023 1,077 (H)  1 - 125 pg/mL Final    WBC 12/02/2023 7.07  4.50 - 11.00 K/uL Final    RBC 12/02/2023 3.78 (L)  4.20 -  5.40 M/uL Final    Hemoglobin 12/02/2023 12.6  12.0 - 16.0 g/dL Final    Hematocrit 12/02/2023 38.1  38.0 - 47.0 % Final    MCV 12/02/2023 100.8 (H)  80.0 - 96.0 fL Final    MCH 12/02/2023 33.3 (H)  27.0 - 31.0 pg Final    MCHC 12/02/2023 33.1  32.0 - 36.0 g/dL Final    RDW 12/02/2023 12.7  11.5 - 14.5 % Final    Platelet Count 12/02/2023 257  150 - 400 K/uL Final    MPV 12/02/2023 9.0 (L)  9.4 - 12.4 fL Final    Neutrophils % 12/02/2023 72.1 (H)  53.0 - 65.0 % Final    Lymphocytes % 12/02/2023 21.8 (L)  27.0 - 41.0 % Final    Monocytes % 12/02/2023 5.5  2.0 - 6.0 % Final    Eosinophils % 12/02/2023 0.0 (L)  1.0 - 4.0 % Final    Basophils % 12/02/2023 0.3  0.0 - 1.0 % Final    Immature Granulocytes % 12/02/2023 0.3  0.0 - 0.4 % Final    nRBC, Auto 12/02/2023 0.0  <=0.0 % Final    Neutrophils, Abs 12/02/2023 5.10  1.80 - 7.70 K/uL Final    Lymphocytes, Absolute 12/02/2023 1.54  1.00 - 4.80 K/uL Final    Monocytes, Absolute 12/02/2023 0.39  0.00 - 0.80 K/uL Final    Eosinophils, Absolute 12/02/2023 0.00  0.00 - 0.50 K/uL Final    Basophils, Absolute 12/02/2023 0.02  0.00 - 0.20 K/uL Final    Immature Granulocytes, Absolute 12/02/2023 0.02  0.00 - 0.04 K/uL Final    nRBC, Absolute 12/02/2023 0.00  <=0.00 x10e3/uL Final    Diff Type 12/02/2023 Auto   Final    POC Glucose 12/02/2023 113 (H)  70 - 105 mg/dL Final    Color, UA 12/02/2023 Yellow  Colorless, Straw, Yellow, Light Yellow, Dark Yellow Final    Clarity, UA 12/02/2023 Clear  Clear Final    pH, UA 12/02/2023 8.0  5.0 to 8.0 pH Units Final    Leukocytes, UA 12/02/2023 Negative  Negative Final    Nitrites, UA 12/02/2023 Negative  Negative Final    Protein, UA 12/02/2023 Negative  Negative Final    Glucose, UA 12/02/2023 Negative  Normal mg/dL Final    Ketones, UA 12/02/2023 Negative  Negative mg/dL Final    Urobilinogen, UA 12/02/2023 0.2  0.2, 1.0, Normal mg/dL Final    Bilirubin, UA 12/02/2023 Negative  Negative Final    Blood, UA 12/02/2023 Negative  Negative  Final    Specific [place], UA 12/02/2023 1.015  <=1.005, 1.010, 1.015, 1.020, 1.025, 1.030 Final    Barbiturates, Urine 12/02/2023 Negative  Negative Final    Benzodiazepine, Urine 12/02/2023 Negative  Negative Final    Opiates, Urine 12/02/2023 Negative  Negative Final    Phencyclidine, Urine 12/02/2023 Negative  Negative Final    Amphetamine, Urine 12/02/2023 Negative  Negative Final    Cannabinoid, Urine 12/02/2023 Negative  Negative Final    Cocaine, Urine 12/02/2023 Negative  Negative Final   Office Visit on 09/21/2023   Component Date Value Ref Range Status    Sodium 09/21/2023 137  136 - 145 mmol/L Final    Potassium 09/21/2023 4.0  3.5 - 5.1 mmol/L Final    Chloride 09/21/2023 102  98 - 107 mmol/L Final    CO2 09/21/2023 31  21 - 32 mmol/L Final    Anion Gap 09/21/2023 8  7 - 16 mmol/L Final    Glucose 09/21/2023 77  74 - 106 mg/dL Final    BUN 09/21/2023 16  7 - 18 mg/dL Final    Creatinine 09/21/2023 0.93  0.55 - 1.02 mg/dL Final    BUN/Creatinine Ratio 09/21/2023 17  6 - 20 Final    Calcium 09/21/2023 10.1  8.5 - 10.1 mg/dL Final    Total Protein 09/21/2023 7.7  6.4 - 8.2 g/dL Final    Albumin 09/21/2023 3.7  3.5 - 5.0 g/dL Final    Globulin 09/21/2023 4.0  2.0 - 4.0 g/dL Final    A/G Ratio 09/21/2023 0.9   Final    Bilirubin, Total 09/21/2023 0.2  >0.0 - 1.2 mg/dL Final    Alk Phos 09/21/2023 81  55 - 142 U/L Final    ALT 09/21/2023 25  13 - 56 U/L Final    AST 09/21/2023 20  15 - 37 U/L Final    eGFR 09/21/2023 67  >=60 mL/min/1.73m2 Final    Triglycerides 09/21/2023 80  35 - 150 mg/dL Final    Cholesterol 09/21/2023 179  0 - 200 mg/dL Final    HDL Cholesterol 09/21/2023 87 (H)  40 - 60 mg/dL Final    Cholesterol/HDL Ratio (Risk Factor) 09/21/2023 2.1   Final    Non-HDL 09/21/2023 92  mg/dL Final    LDL Calculated 09/21/2023 76  mg/dL Final    VLDL 09/21/2023 16  mg/dL Final    WBC 09/21/2023 7.12  4.50 - 11.00 K/uL Final    RBC 09/21/2023 4.09 (L)  4.20 - 5.40 M/uL Final    Hemoglobin 09/21/2023 13.0   12.0 - 16.0 g/dL Final    Hematocrit 09/21/2023 40.6  38.0 - 47.0 % Final    MCV 09/21/2023 99.3 (H)  80.0 - 96.0 fL Final    MCH 09/21/2023 31.8 (H)  27.0 - 31.0 pg Final    MCHC 09/21/2023 32.0  32.0 - 36.0 g/dL Final    RDW 09/21/2023 12.1  11.5 - 14.5 % Final    Platelet Count 09/21/2023 236  150 - 400 K/uL Final    MPV 09/21/2023 9.1 (L)  9.4 - 12.4 fL Final    Neutrophils % 09/21/2023 52.5 (L)  53.0 - 65.0 % Final    Lymphocytes % 09/21/2023 34.3  27.0 - 41.0 % Final    Monocytes % 09/21/2023 9.4 (H)  2.0 - 6.0 % Final    Eosinophils % 09/21/2023 2.8  1.0 - 4.0 % Final    Basophils % 09/21/2023 0.7  0.0 - 1.0 % Final    Immature Granulocytes % 09/21/2023 0.3  0.0 - 0.4 % Final    nRBC, Auto 09/21/2023 0.0  <=0.0 % Final    Neutrophils, Abs 09/21/2023 3.74  1.80 - 7.70 K/uL Final    Lymphocytes, Absolute 09/21/2023 2.44  1.00 - 4.80 K/uL Final    Monocytes, Absolute 09/21/2023 0.67  0.00 - 0.80 K/uL Final    Eosinophils, Absolute 09/21/2023 0.20  0.00 - 0.50 K/uL Final    Basophils, Absolute 09/21/2023 0.05  0.00 - 0.20 K/uL Final    Immature Granulocytes, Absolute 09/21/2023 0.02  0.00 - 0.04 K/uL Final    nRBC, Absolute 09/21/2023 0.00  <=0.00 x10e3/uL Final    Diff Type 09/21/2023 Auto   Final   Lab Visit on 09/18/2023   Component Date Value Ref Range Status    TSH 09/18/2023 1.420  0.358 - 3.740 uIU/mL Final    Vitamin B12 09/18/2023 703  193 - 986 pg/mL Final    Folate 09/18/2023 >20.0 (H)  3.1 - 17.5 ng/mL Final    Vitamin D 25-Hydroxy, Blood 09/18/2023 19.0  ng/mL Final   Office Visit on 09/18/2023   Component Date Value Ref Range Status    POC Amphetamines 09/18/2023 Negative  Negative, Inconclusive Final    POC Barbiturates 09/18/2023 Negative  Negative, Inconclusive Final    POC Benzodiazepines 09/18/2023 Negative  Negative, Inconclusive Final    POC Cocaine 09/18/2023 Negative  Negative, Inconclusive Final    POC THC 09/18/2023 Negative  Negative, Inconclusive Final    POC Methadone 09/18/2023  Negative  Negative, Inconclusive Final    POC Methamphetamine 09/18/2023 Negative  Negative, Inconclusive Final    POC Opiates 09/18/2023 Negative  Negative, Inconclusive Final    POC Oxycodone 09/18/2023 Negative  Negative, Inconclusive Final    POC Phencyclidine 09/18/2023 Negative  Negative, Inconclusive Final    POC Methylenedioxymethamphetamine * 09/18/2023 Negative  Negative, Inconclusive Final    POC Tricyclic Antidepressants 09/18/2023 Negative  Negative, Inconclusive Final    POC Buprenorphine 09/18/2023 Negative   Final     Acceptable 09/18/2023 Yes   Final    POC Temperature (Urine) 09/18/2023 90   Final    pH, UA 09/18/2023 7.5  5.0 to 8.0 pH Units Final    Creatinine, Urine 09/18/2023 51  28 - 219 mg/dL Final    6-Acetylmorphine 09/18/2023 Negative  10 ng/mL Final    7-Aminoclonazepam 09/18/2023 Negative  Negative 25 ng/mL Final    a-Hydroxyalprazolam 09/18/2023 Negative  Negative 25 ng/mL Final    Acetyl Fentanyl 09/18/2023 Negative  Negative 2.5 ng/mL Final    Acetyl Norfentanyl Oxalate 09/18/2023 Negative  Negative 5 ng/mL Final    Benzoylecgonine 09/18/2023 Negative  Negative 100 ng/mL Final    Buprenorphine 09/18/2023 Negative  25 ng/mL Final    Codeine 09/18/2023 Negative  Negative 25 ng/mL Final    EDDP 09/18/2023 Negative  Negative 25 ng/mL Final    Fentanyl 09/18/2023 Negative  Negative 2.5 ng/mL Final    Hydrocodone 09/18/2023 Negative  Negative 25 ng/mL Final    Hydromorphone 09/18/2023 Negative  Negative 25 ng/mL Final    Lorazepam 09/18/2023 Negative  Negative 25 ng/mL Final    Morphine 09/18/2023 Negative  Negative 25 ng/mL Final    Norbuprenorphine 09/18/2023 Negative  Negative 25 ng/mL Final    Nordiazepam 09/18/2023 Negative  Negative 25 ng/mL Final    Norfentanyl Oxalate 09/18/2023 Negative  Negative 5 ng/mL Final    Norhydrocodone 09/18/2023 Negative  Negative 50 ng/mL Final    Noroxycodone HCL 09/18/2023 Negative  Negative 50 ng/mL Final    Oxazepam 09/18/2023 Negative   Negative 25 ng/mL Final    Oxymorphone 09/18/2023 Negative  Negative 25 ng/mL Final    Tapentadol 09/18/2023 Negative  Negative 25 ng/mL Final    Temazepam 09/18/2023 Negative  Negative 25 ng/mL Final    THC-COOH 09/18/2023 Negative  Negative 25 ng/mL Final    Tramadol 09/18/2023 >1,000.0 (H)  <100.0 100 ng/mL Final    Amphetamine, Urine 09/18/2023 Negative  Negative Final    Methamphetamines, Urine 09/18/2023 Negative  Negative Final    Methadone, Urine 09/18/2023 Negative  Negative 25 ng/mL Final    Oxycodone, Urine 09/18/2023 Negative  Negative 25 ng/mL Final    Specific Gravity, UA 09/18/2023 1.013  <=1.030 Final         No orders of the defined types were placed in this encounter.      Requested Prescriptions     Signed Prescriptions Disp Refills    gabapentin (NEURONTIN) 100 MG capsule 90 capsule 2     Sig: Take 1 capsule (100 mg total) by mouth 3 (three) times daily.    traMADoL (ULTRAM) 50 mg tablet 120 tablet 2     Sig: Take 1 tablet (50 mg total) by mouth every 6 (six) hours as needed for Pain.       Assessment:     1. Neuropathy    2. Closed displaced fracture of surgical neck of left humerus with routine healing, unspecified fracture morphology, subsequent encounter    3. Left arm pain         A's of Opioid Risk Assessment  Activity:Patient can perform ADL.   Analgesia:Patients pain is partially controlled by current medication. Patient has tried OTC medications such as Tylenol and Ibuprofen with out relief.   Adverse Effects: Patient denies constipation or sedation.  Aberrant Behavior:  reviewed with no aberrant drug seeking/taking behavior.  Overdose reversal drug naloxone discussed    Drug screen reviewed      Physical therapy [facility] December 28, 2023 through February 27, 2024    Bone density study March 14, 2023 osteoporosis        Plan:    September 19, 2023 vitamin-D level 19    Vitamin-D replacement 50,000 units 1 capsule p.o. q.week times 8 week      [facility]    Presumptive drug  screen negative, this is expected result, patient takes tramadol, cup does not test for tramadol       Patient has discontinue benzodiazepine       Complaint of pain burning numbness and tingling bilateral feet worse at night neuropathy type pain    She states current medications helping control her discomfort     She would like to continue current medication conservative management    Continue home exercise program as directed    Continue current medication as directed     Follow-up 3 months    Dr. Osorio January 2025    Bring original prescription medication bottles/container/box with labels to each visit

## 2024-03-01 ENCOUNTER — CLINICAL SUPPORT (OUTPATIENT)
Dept: REHABILITATION | Facility: HOSPITAL | Age: 69
End: 2024-03-01
Payer: MEDICARE

## 2024-03-01 DIAGNOSIS — M25.512 ACUTE PAIN OF LEFT SHOULDER: Primary | ICD-10-CM

## 2024-03-01 PROCEDURE — 97530 THERAPEUTIC ACTIVITIES: CPT | Mod: CQ

## 2024-03-01 PROCEDURE — 97110 THERAPEUTIC EXERCISES: CPT | Mod: CQ

## 2024-03-01 NOTE — PROGRESS NOTES
"[facility]   Physical Therapy Treatment Note     Name: Lorraine Brambila  Clinic Number: [id number]    Therapy Diagnosis:   Encounter Diagnosis   Name Primary?    Acute pain of left shoulder Yes       Physician: Abdirahman Gill MD    Visit Date: 3/1/2024    Physician Orders: PT Eval and Treat   Medical Diagnosis from Referral: displaced fracture of L humerus  Evaluation Date: 12/28/2023  Authorization Period Expiration: 11/26/2024  Plan of Care Expiration: 2/23/2024  Progress Note Due:2/23/2024  Date of Surgery: NA   Visit # / Visits authorized: 23/32  FOTO: to be completed on visit 26     Precautions: Standard and Fall      Time In: 14:45  Time Out:  15:23  Total Billable Time: 38 minutes     PTA Visit #: 1/5  SUBJECTIVE   Pt reports: "My shoulder is doing good, but I still have trouble reaching out to the side with it".       She was compliant with home exercise program.  Response to previous treatment: N/A  Functional change: increased use of L UE for ADLs    Pain: 1/10 with activity   Location: left shoulder    OBJECTIVE   Objective Measures updated at progress report unless specified.   Treatment   LORRAINE received the treatments listed below:      therapeutic exercises to develop strength, endurance, ROM, flexibility, and posture. See flow-sheet below:     Therapeutic-Exercise  Reps          Pulleys (flexion and ABD) 4 minutes each    Finger Ladder (flexion and ABD) 5 x 5" each    Ball Rolls flexion and ABD  10 x 10" each on wall and table    Bicep Curls  20  x 2#   Bent over rows  2 x 10, 2# *    Bent over shoulder extension  2 x 10, 2# *    Wand flexion stretch  10 x 5" , 2#    Wand ER stretch  10 x 10"    Sidelying ER/IR  2 x 10 each , 1#    Table ER stretch         Therapeutic activities to increase functional performance for 16 minutes including: See flowsheet below    Therapeutic Activities Reps    T-band Rows  2 x 10 red TB    T-band Extension  2 x 10 red TB    I,Y,Ts  2 " x 10  each    Standing PNF Patterns  2 x 10 each (min A D2)      Patient Education and Home Exercises     Home Exercises Provided and Patient Education Provided     Education provided:   - Home exercise program, Plan of Care, Delayed onset muscle soreness     Written Home Exercises Provided: Patient instructed to cont prior HEP. Exercises were reviewed and LORRAINE was able to demonstrate them prior to the end of the session.  LORRAINE demonstrated good  understanding of the education provided. See EMR under Patient Instructions for exercises provided during therapy sessions    ASSESSMENT   Lorraine is a 68 y.o. female referred to outpatient Physical Therapy with a medical diagnosis of displaced L humerus fracture. Patient presents with L shoulder pain, decreased L shoulder ROM, and decreased L UE muscle strength and motor control. Patient's impairments are currently limiting her functional use of L UE to complete ADLs. Patient continues to require moderate cueing for decreased speed, decreased compensations, and increased scapular stability to increase effectiveness of exercises.  Patient fatigues easily during completion of active range of motion exercises and PNF patterns.  Patient reports muscle fatigue without reports of increased pain at end of treatment session.     Patient prognosis is Good.   Patient will benefit from skilled outpatient Physical Therapy to address the deficits stated above and in the chart below, provide patient /family education, and to maximize patientt's level of independence.      Plan of care discussed with patient: Yes  Patient's spiritual, cultural and educational needs considered and patient is agreeable to the plan of care and goals as stated below:      Anticipated Barriers for therapy: compliance with Home exercise program, guarding  Goals:  Short Term Goals: 2 weeks   Patient will independently complete Home exercise program with correct form.   Patient will report decreased L shoulder  pain at rest to less than or equal to 2/10 for improved quality of life.      Long Term Goals: 4 weeks   Pt will increase L shoulder flexion to 120 degrees, external rotation to C4, and internal rotation to L2 for independent dressing  Pt will increase L upper extremity to 4/5 to allow patient to perform activities of daily living independently  Pt will report decrease in L shoulder pain to 3/10 at worst to improve quality of life  Patient will have increased FOTO score to greater than or equal to 55% indicating increased function.    PLAN   Plan of care Certification: 12/28/2023 to 3/22/2024.      Outpatient Physical Therapy 2 times weekly for 4 weeks to include the following interventions: Electrical Stimulation unattended, Manual Therapy, Moist Heat/ Ice, Patient Education, Therapeutic Activities, Therapeutic Exercise, and Ultrasound.     Continue POC per PT orders to progress patient toward rehab goals.   Recert 2 times per week for 4 weeks to continue progressing toward rehab goals.   YOVANNY Rahman      3/1/2024

## 2024-03-04 ENCOUNTER — CLINICAL SUPPORT (OUTPATIENT)
Dept: REHABILITATION | Facility: HOSPITAL | Age: 69
End: 2024-03-04
Payer: MEDICARE

## 2024-03-04 DIAGNOSIS — M25.512 ACUTE PAIN OF LEFT SHOULDER: Primary | ICD-10-CM

## 2024-03-04 PROCEDURE — 97530 THERAPEUTIC ACTIVITIES: CPT | Mod: CQ

## 2024-03-04 PROCEDURE — 97110 THERAPEUTIC EXERCISES: CPT | Mod: CQ

## 2024-03-04 NOTE — PROGRESS NOTES
"[facility]   Physical Therapy Treatment Note     Name: Lorraine Brambila  Clinic Number: [id number]    Therapy Diagnosis:   No diagnosis found.      Physician: Abdirahman Gill MD    Visit Date: 3/4/2024    Physician Orders: PT Eval and Treat   Medical Diagnosis from Referral: displaced fracture of L humerus  Evaluation Date: 12/28/2023  Authorization Period Expiration: 11/26/2024  Plan of Care Expiration: 2/23/2024  Progress Note Due:2/23/2024  Date of Surgery: NA   Visit # / Visits authorized: 24/32  FOTO: to be completed on visit 26     Precautions: Standard and Fall      Time In: 11:06  Time Out:  11:38  Total Billable Time: 32 minutes     PTA Visit #: 3/5  SUBJECTIVE   Pt reports: "I'm just a little sore and it feels weak".       She was compliant with home exercise program.  Response to previous treatment: N/A  Functional change: increased use of L UE for ADLs    Pain: 1/10 with activity   Location: left shoulder    OBJECTIVE   Objective Measures updated at progress report unless specified.   Treatment   LORRAINE received the treatments listed below:      therapeutic exercises to develop strength, endurance, ROM, flexibility, and posture. See flow-sheet below:     Therapeutic-Exercise  Reps          UBE 4 minutes each    Finger Ladder (flexion and ABD) 5 x 5" each    Ball Rolls flexion and ABD  10 x 10" each on wall and table    Bicep Curls  20  x 2#   Bent over rows  2 x 10, 2#    Bent over shoulder extension  2 x 10, 2#    Wand flexion stretch  10 x 5" , 2#    Wand ER stretch  10 x 10"    Sidelying ER/IR  2 x 10 each , 1#    Table ER stretch         Therapeutic activities to increase functional performance for 16 minutes including: See flowsheet below    Therapeutic Activities Reps    T-band Rows  2 x 10 red TB    T-band Extension  2 x 10 red TB    I,Y,Ts  2 x 10  each    Standing PNF Patterns  2 x 10 each (min A D2)      Patient Education and Home Exercises     Home Exercises " Provided and Patient Education Provided     Education provided:   - Home exercise program, Plan of Care, Delayed onset muscle soreness     Written Home Exercises Provided: Patient instructed to cont prior HEP. Exercises were reviewed and LORRAINE was able to demonstrate them prior to the end of the session.  LORRAINE demonstrated good  understanding of the education provided. See EMR under Patient Instructions for exercises provided during therapy sessions    ASSESSMENT   Lorraine is a 68 y.o. female referred to outpatient Physical Therapy with a medical diagnosis of displaced L humerus fracture. Patient presents with L shoulder pain, decreased L shoulder ROM, and decreased L UE muscle strength and motor control. Patient's impairments are currently limiting her functional use of L UE to complete ADLs. LPTA provided pt with proper setup UBE to decrease stiffness, improve strength, and promote warmup prior to exercises. Pt continues to require moderate cueing for decreased speed, decreased compensations, and increased scapular stability to increase effectiveness of exercises.  Pt displays improvement in range of motion and exercise tolerance.  Pt requires occasional rest breaks secondary to fatigue. No adverse effects noted.    Patient prognosis is Good.   Patient will benefit from skilled outpatient Physical Therapy to address the deficits stated above and in the chart below, provide patient /family education, and to maximize patientt's level of independence.      Plan of care discussed with patient: Yes  Patient's spiritual, cultural and educational needs considered and patient is agreeable to the plan of care and goals as stated below:      Anticipated Barriers for therapy: compliance with Home exercise program, guarding  Goals:  Short Term Goals: 2 weeks   Patient will independently complete Home exercise program with correct form.   Patient will report decreased L shoulder pain at rest to less than or equal to 2/10 for  improved quality of life.      Long Term Goals: 4 weeks   Pt will increase L shoulder flexion to 120 degrees, external rotation to C4, and internal rotation to L2 for independent dressing  Pt will increase L upper extremity to 4/5 to allow patient to perform activities of daily living independently  Pt will report decrease in L shoulder pain to 3/10 at worst to improve quality of life  Patient will have increased FOTO score to greater than or equal to 55% indicating increased function.    PLAN   Plan of care Certification: 12/28/2023 to 3/22/2024.      Outpatient Physical Therapy 2 times weekly for 4 weeks to include the following interventions: Electrical Stimulation unattended, Manual Therapy, Moist Heat/ Ice, Patient Education, Therapeutic Activities, Therapeutic Exercise, and Ultrasound.     Continue POC per PT orders to progress patient toward rehab goals.   Recert 2 times per week for 4 weeks to continue progressing toward rehab goals.   YOVANNY Sanchez  3/4/2024

## 2024-03-05 ENCOUNTER — OFFICE VISIT (OUTPATIENT)
Dept: PAIN MEDICINE | Facility: CLINIC | Age: 69
End: 2024-03-05
Payer: MEDICARE

## 2024-03-05 VITALS
BODY MASS INDEX: 13.5 KG/M2 | DIASTOLIC BLOOD PRESSURE: 56 MMHG | RESPIRATION RATE: 18 BRPM | HEIGHT: 66 IN | HEART RATE: 71 BPM | SYSTOLIC BLOOD PRESSURE: 142 MMHG | WEIGHT: 84 LBS

## 2024-03-05 DIAGNOSIS — M79.602 LEFT ARM PAIN: Chronic | ICD-10-CM

## 2024-03-05 DIAGNOSIS — S42.212D CLOSED DISPLACED FRACTURE OF SURGICAL NECK OF LEFT HUMERUS WITH ROUTINE HEALING, UNSPECIFIED FRACTURE MORPHOLOGY, SUBSEQUENT ENCOUNTER: ICD-10-CM

## 2024-03-05 DIAGNOSIS — G62.9 NEUROPATHY: Primary | Chronic | ICD-10-CM

## 2024-03-05 PROCEDURE — 99214 OFFICE O/P EST MOD 30 MIN: CPT | Mod: S$PBB,,, | Performed by: PHYSICIAN ASSISTANT

## 2024-03-05 PROCEDURE — 99214 OFFICE O/P EST MOD 30 MIN: CPT | Mod: PBBFAC | Performed by: PHYSICIAN ASSISTANT

## 2024-03-05 RX ORDER — TRAMADOL HYDROCHLORIDE 50 MG/1
50 TABLET ORAL EVERY 6 HOURS PRN
Qty: 120 TABLET | Refills: 2 | Status: SHIPPED | OUTPATIENT
Start: 2024-03-09 | End: 2024-05-29 | Stop reason: SDUPTHER

## 2024-03-05 RX ORDER — GABAPENTIN 100 MG/1
100 CAPSULE ORAL 3 TIMES DAILY
Qty: 90 CAPSULE | Refills: 2 | Status: SHIPPED | OUTPATIENT
Start: 2024-03-05 | End: 2024-05-29 | Stop reason: SDUPTHER

## 2024-03-06 ENCOUNTER — CLINICAL SUPPORT (OUTPATIENT)
Dept: REHABILITATION | Facility: HOSPITAL | Age: 69
End: 2024-03-06
Payer: MEDICARE

## 2024-03-06 DIAGNOSIS — M25.512 ACUTE PAIN OF LEFT SHOULDER: Primary | ICD-10-CM

## 2024-03-06 PROCEDURE — 97530 THERAPEUTIC ACTIVITIES: CPT | Mod: CQ

## 2024-03-06 PROCEDURE — 97110 THERAPEUTIC EXERCISES: CPT | Mod: CQ

## 2024-03-06 NOTE — PROGRESS NOTES
"[facility]   Physical Therapy Treatment Note     Name: Lorraine Brambila  Clinic Number: [id number]    Therapy Diagnosis:   Encounter Diagnosis   Name Primary?    Acute pain of left shoulder Yes         Physician: Abdirahman Gill MD    Visit Date: 3/6/2024    Physician Orders: PT Eval and Treat   Medical Diagnosis from Referral: displaced fracture of L humerus  Evaluation Date: 12/28/2023  Authorization Period Expiration: 11/26/2024  Plan of Care Expiration: 2/23/2024  Progress Note Due:2/23/2024  Date of Surgery: NA   Visit # / Visits authorized: 25/32  FOTO: to be completed on visit 26     Precautions: Standard and Fall      Time In: 12:30  Time Out:  13:10  Total Billable Time: 40 minutes     PTA Visit #: 4/5  SUBJECTIVE   Pt reports: Chief complaint of weakness in Left shoulder       She was compliant with home exercise program.  Response to previous treatment: N/A  Functional change: increased use of L UE for ADLs    Pain: 1/10 with activity   Location: left shoulder    OBJECTIVE   Objective Measures updated at progress report unless specified.   Treatment   LORRAINE received the treatments listed below:      therapeutic exercises to develop strength, endurance, ROM, flexibility, and posture. See flow-sheet below:     Therapeutic-Exercise  Reps          UBE 4 minutes each    Finger Ladder (flexion and ABD) 5 x 5" each    Ball Rolls flexion and ABD  10 x 10" each on wall and table    Bicep Curls  20  x 2#   Bent over rows  2 x 10, 2#    Bent over shoulder extension  2 x 10, 2#    Wand flexion stretch  10 x 5" , 2#    Wand ER stretch  10 x 10"    Sidelying ER/IR  2 x 10 each , 1#    Table ER stretch         Therapeutic activities to increase functional performance for 16 minutes including: See flowsheet below    Therapeutic Activities Reps    T-band Rows  2 x 10 red TB    T-band Extension  2 x 10 red TB    I,Y,Ts  2 x 10  each    Standing PNF Patterns  2 x 10 each (min A D2)  "     Patient Education and Home Exercises     Home Exercises Provided and Patient Education Provided     Education provided:   - Home exercise program, Plan of Care, Delayed onset muscle soreness     Written Home Exercises Provided: Patient instructed to cont prior HEP. Exercises were reviewed and LORRAINE was able to demonstrate them prior to the end of the session.  LORRAINE demonstrated good  understanding of the education provided. See EMR under Patient Instructions for exercises provided during therapy sessions    ASSESSMENT   Lorraine is a 68 y.o. female referred to outpatient Physical Therapy with a medical diagnosis of displaced L humerus fracture. Patient presents with L shoulder pain, decreased L shoulder ROM, and decreased L UE muscle strength and motor control. Patient's impairments are currently limiting her functional use of L UE to complete ADLs. LPTA provided pt with proper setup UBE to decrease stiffness, improve strength, and promote warmup prior to exercises. Pt continues to require moderate cueing for decreased speed, decreased compensations, and increased scapular stability to increase effectiveness of exercises.  Pt reports increased use of L UE with ADL's   Pt requires occasional rest breaks secondary to fatigue. No adverse effects noted.    Patient prognosis is Good.   Patient will benefit from skilled outpatient Physical Therapy to address the deficits stated above and in the chart below, provide patient /family education, and to maximize patientt's level of independence.      Plan of care discussed with patient: Yes  Patient's spiritual, cultural and educational needs considered and patient is agreeable to the plan of care and goals as stated below:      Anticipated Barriers for therapy: compliance with Home exercise program, guarding  Goals:  Short Term Goals: 2 weeks   Patient will independently complete Home exercise program with correct form.   Patient will report decreased L shoulder pain at  rest to less than or equal to 2/10 for improved quality of life.      Long Term Goals: 4 weeks   Pt will increase L shoulder flexion to 120 degrees, external rotation to C4, and internal rotation to L2 for independent dressing  Pt will increase L upper extremity to 4/5 to allow patient to perform activities of daily living independently  Pt will report decrease in L shoulder pain to 3/10 at worst to improve quality of life  Patient will have increased FOTO score to greater than or equal to 55% indicating increased function.    PLAN   Plan of care Certification: 12/28/2023 to 3/22/2024.      Outpatient Physical Therapy 2 times weekly for 4 weeks to include the following interventions: Electrical Stimulation unattended, Manual Therapy, Moist Heat/ Ice, Patient Education, Therapeutic Activities, Therapeutic Exercise, and Ultrasound.     Continue POC per PT orders to progress patient toward rehab goals.   Recert 2 times per week for 4 weeks to continue progressing toward rehab goals.   YOVANNY Rahman   3/6/2024

## 2024-03-08 DIAGNOSIS — S42.92XD CLOSED FRACTURE OF LEFT SHOULDER WITH ROUTINE HEALING, SUBSEQUENT ENCOUNTER: Primary | ICD-10-CM

## 2024-03-11 ENCOUNTER — OFFICE VISIT (OUTPATIENT)
Dept: ORTHOPEDICS | Facility: CLINIC | Age: 69
End: 2024-03-11
Payer: MEDICARE

## 2024-03-11 ENCOUNTER — HOSPITAL ENCOUNTER (OUTPATIENT)
Dept: RADIOLOGY | Facility: HOSPITAL | Age: 69
Discharge: HOME | End: 2024-03-11
Attending: ORTHOPAEDIC SURGERY
Payer: MEDICARE

## 2024-03-11 DIAGNOSIS — S42.92XD CLOSED FRACTURE OF LEFT SHOULDER WITH ROUTINE HEALING, SUBSEQUENT ENCOUNTER: ICD-10-CM

## 2024-03-11 DIAGNOSIS — S42.92XD CLOSED FRACTURE OF LEFT SHOULDER WITH ROUTINE HEALING, SUBSEQUENT ENCOUNTER: Primary | ICD-10-CM

## 2024-03-11 PROCEDURE — 73030 X-RAY EXAM OF SHOULDER: CPT | Mod: 26,LT,, | Performed by: ORTHOPAEDIC SURGERY

## 2024-03-11 PROCEDURE — 99213 OFFICE O/P EST LOW 20 MIN: CPT | Mod: PBBFAC,25 | Performed by: ORTHOPAEDIC SURGERY

## 2024-03-11 PROCEDURE — 99214 OFFICE O/P EST MOD 30 MIN: CPT | Mod: S$PBB,,, | Performed by: ORTHOPAEDIC SURGERY

## 2024-03-11 PROCEDURE — 73030 X-RAY EXAM OF SHOULDER: CPT | Mod: TC,LT

## 2024-03-11 NOTE — PROGRESS NOTES
CLINIC NOTE       Chief Complaint   Patient presents with    Left Shoulder - Follow-up        Lorraine Brambila is a 68 y.o. female seen today for  recheck of her left shoulder.  She sustained a closed mildly displaced impacted fracture of the left proximal humerus 10/24/2023.  She was nonsurgically.  She was gone on to uneventful healing.  She was been undergoing physical therapy efforts restore motion.  She was made good progress in presently can abduct 75° independent of scapulothoracic motion.    Past Medical History:   Diagnosis Date    COPD (chronic obstructive pulmonary disease)     Coronary artery disease     Fibromyalgia     Hyperlipidemia     Hypertension      Family History   Problem Relation Age of Onset    Heart attack Father     Heart disease Father      Current Outpatient Medications on File Prior to Visit   Medication Sig Dispense Refill    atorvastatin (LIPITOR) 40 MG tablet Take 1 tablet (40 mg total) by mouth every evening. 90 tablet 2    clopidogreL (PLAVIX) 75 mg tablet Take 1 tablet (75 mg total) by mouth once daily. 30 tablet 0    gabapentin (NEURONTIN) 100 MG capsule Take 1 capsule (100 mg total) by mouth 3 (three) times daily. 90 capsule 2    HYDROcodone-acetaminophen (NORCO) 5-325 mg per tablet Take 1 tablet by mouth every 6 (six) hours as needed for Pain. (Patient not taking: Reported on 1/10/2024) 28 tablet 0    metoprolol succinate (TOPROL-XL) 50 MG 24 hr tablet Take 1 tablet (50 mg total) by mouth once daily. 90 tablet 2    mirtazapine (REMERON SOL-TAB) 45 MG disintegrating tablet Take 1 tablet (45 mg total) by mouth nightly. 90 tablet 0    mirtazapine (REMERON) 45 MG tablet Take 1 tablet (45 mg total) by mouth every evening. 90 tablet 0    nitroGLYCERIN (NITROSTAT) 0.4 MG SL tablet Place 1 tablet (0.4 mg total) under the tongue every 5 (five) minutes as needed for Chest pain. 90 tablet 3    ondansetron (ZOFRAN) 4 MG tablet Take 4 mg by mouth every 8 (eight) hours as needed for  Nausea.      pantoprazole (PROTONIX) 40 MG tablet       traMADoL (ULTRAM) 50 mg tablet Take 1 tablet (50 mg total) by mouth every 6 (six) hours as needed for Pain. 120 tablet 2     No current facility-administered medications on file prior to visit.       ROS     There were no vitals filed for this visit.    Past Surgical History:   Procedure Laterality Date    CARDIAC CATHETERIZATION      HERNIA REPAIR      HYSTERECTOMY      OOPHORECTOMY          Review of patient's allergies indicates:  No Known Allergies     Ortho Exam     Radiographic Examination:  Left shoulder 03/11/2024  Technique:  Two views AP and lateral projection    Findings:  Bones well mineralized.  Glenohumeral joint is located.  Impacted fracture left proximal humerus appears healed with mild internal rotation of the humeral head    Impression:   See Above    Assessment and Plan  Patient Active Problem List    Diagnosis Date Noted    Left arm pain 02/29/2024    Fracture of left shoulder with routine healing 01/29/2024    Acute pain of left shoulder 12/28/2023    Acute focal neurological deficit 12/02/2023    Extravasation accident, initial encounter 12/02/2023    Confusion 12/02/2023    Acute nonintractable headache 12/02/2023    Closed displaced fracture of surgical neck of left humerus with routine healing 11/13/2023    Neuropathy 09/18/2023    Lumbosacral spondylosis without myelopathy 09/18/2023    Myalgia 09/18/2023    Vitamin D deficiency 09/18/2023    Coronary artery disease     History of non-ST elevation myocardial infarction (NSTEMI)     Hyperlipidemia     Hypertension     Impression:  Healed left proximal humerus fracture   Plan:  We will complete an additional 4 weeks physical therapy left shoulder restore motion.  She will continue with self-directed home therapy at that point.      Abdirahman Gill M.D.

## 2024-03-12 ENCOUNTER — CLINICAL SUPPORT (OUTPATIENT)
Dept: REHABILITATION | Facility: HOSPITAL | Age: 69
End: 2024-03-12
Payer: MEDICARE

## 2024-03-12 DIAGNOSIS — M25.512 ACUTE PAIN OF LEFT SHOULDER: Primary | ICD-10-CM

## 2024-03-12 PROCEDURE — 97110 THERAPEUTIC EXERCISES: CPT | Mod: CQ

## 2024-03-12 PROCEDURE — 97530 THERAPEUTIC ACTIVITIES: CPT | Mod: CQ

## 2024-03-12 NOTE — PROGRESS NOTES
"[facility]   Physical Therapy Treatment Note     Name: Lorraine Brambila  Clinic Number: [id number]    Therapy Diagnosis:   No diagnosis found.        Physician: Abdirahman Gill MD    Visit Date: 3/12/2024    Physician Orders: PT Eval and Treat   Medical Diagnosis from Referral: displaced fracture of L humerus  Evaluation Date: 12/28/2023  Authorization Period Expiration: 11/26/2024  Plan of Care Expiration: 2/23/2024  Progress Note Due:2/23/2024  Date of Surgery: NA   Visit # / Visits authorized: 26/32  FOTO: to be completed on visit 26     Precautions: Standard and Fall      Time In: 11:00  Time Out:  13:10  Total Billable Time: 40 minutes     PTA Visit #: 5/5  SUBJECTIVE   Pt reports: Chief complaint of weakness in Left shoulder       She was compliant with home exercise program.  Response to previous treatment: N/A  Functional change: increased use of L UE for ADLs    Pain: 1/10 with activity   Location: left shoulder    OBJECTIVE   Objective Measures updated at progress report unless specified.   Treatment   LORRAINE received the treatments listed below:      therapeutic exercises to develop strength, endurance, ROM, flexibility, and posture. See flow-sheet below:     Therapeutic-Exercise  Reps          UBE 4 minutes each    Finger Ladder (flexion and ABD) 5 x 5" each    Ball Rolls flexion and ABD  10 x 10" each on wall and table    Bicep Curls  20  x 2#   Bent over rows  2 x 10, 2#    Bent over shoulder extension  2 x 10, 2#    Wand flexion stretch  10 x 5" , 2#    Wand ER stretch  10 x 10"    Sidelying ER/IR  2 x 10 each , 1#    Table ER stretch         Therapeutic activities to increase functional performance for 16 minutes including: See flowsheet below    Therapeutic Activities Reps    T-band Rows  2 x 10 red TB    T-band Extension  2 x 10 red TB    I,Y,Ts  2 x 10  each    Standing PNF Patterns  2 x 10 each (min A D2)      Patient Education and Home Exercises     Home " Exercises Provided and Patient Education Provided     Education provided:   - Home exercise program, Plan of Care, Delayed onset muscle soreness     Written Home Exercises Provided: Patient instructed to cont prior HEP. Exercises were reviewed and LORRAINE was able to demonstrate them prior to the end of the session.  LORRAINE demonstrated good  understanding of the education provided. See EMR under Patient Instructions for exercises provided during therapy sessions    ASSESSMENT   Lorraine is a 68 y.o. female referred to outpatient Physical Therapy with a medical diagnosis of displaced L humerus fracture. Patient presents with L shoulder pain, decreased L shoulder ROM, and decreased L UE muscle strength and motor control. Patient's impairments are currently limiting her functional use of L UE to complete ADLs. LPTA provided pt with proper setup UBE to decrease stiffness, improve strength, and promote warmup prior to exercises. Pt continues to require moderate cueing for decreased speed, decreased compensations, and increased scapular stability to increase effectiveness of exercises. Pt requires increased motivational cues throughout tx session. Pt requires occasional rest breaks with reports of weakness and fatigue. Pt progressed through tx quickly with no adverse effects noted.    Patient prognosis is Good.   Patient will benefit from skilled outpatient Physical Therapy to address the deficits stated above and in the chart below, provide patient /family education, and to maximize patientt's level of independence.      Plan of care discussed with patient: Yes  Patient's spiritual, cultural and educational needs considered and patient is agreeable to the plan of care and goals as stated below:      Anticipated Barriers for therapy: compliance with Home exercise program, guarding  Goals:  Short Term Goals: 2 weeks   Patient will independently complete Home exercise program with correct form.   Patient will report decreased L  shoulder pain at rest to less than or equal to 2/10 for improved quality of life.      Long Term Goals: 4 weeks   Pt will increase L shoulder flexion to 120 degrees, external rotation to C4, and internal rotation to L2 for independent dressing  Pt will increase L upper extremity to 4/5 to allow patient to perform activities of daily living independently  Pt will report decrease in L shoulder pain to 3/10 at worst to improve quality of life  Patient will have increased FOTO score to greater than or equal to 55% indicating increased function.    PLAN   Plan of care Certification: 12/28/2023 to 3/22/2024.      Outpatient Physical Therapy 2 times weekly for 4 weeks to include the following interventions: Electrical Stimulation unattended, Manual Therapy, Moist Heat/ Ice, Patient Education, Therapeutic Activities, Therapeutic Exercise, and Ultrasound.     Continue POC per PT orders to progress patient toward rehab goals.   Recert 2 times per week for 4 weeks to continue progressing toward rehab goals.   YOVANNY Sanchez   3/12/2024

## 2024-03-19 ENCOUNTER — CLINICAL SUPPORT (OUTPATIENT)
Dept: REHABILITATION | Facility: HOSPITAL | Age: 69
End: 2024-03-19
Payer: MEDICARE

## 2024-03-19 DIAGNOSIS — M25.512 ACUTE PAIN OF LEFT SHOULDER: Primary | ICD-10-CM

## 2024-03-19 DIAGNOSIS — E78.00 HYPERCHOLESTEROLEMIA: ICD-10-CM

## 2024-03-19 PROCEDURE — 97530 THERAPEUTIC ACTIVITIES: CPT | Mod: CQ

## 2024-03-19 PROCEDURE — 97110 THERAPEUTIC EXERCISES: CPT

## 2024-03-19 NOTE — PROGRESS NOTES
"[facility]   Physical Therapy Treatment Note     Name: Lorraine Brambila  Clinic Number: [id number]    Therapy Diagnosis:   No diagnosis found.        Physician: Abdirahman Gill MD    Visit Date: 3/19/2024    Physician Orders: PT Eval and Treat   Medical Diagnosis from Referral: displaced fracture of L humerus  Evaluation Date: 12/28/2023  Authorization Period Expiration: 11/26/2024  Plan of Care Expiration: 2/23/2024  Progress Note Due:2/23/2024  Date of Surgery: NA   Visit # / Visits authorized: 27/32  FOTO: to be completed on visit 26     Precautions: Standard and Fall      Time In: 11:05  Time Out: 11:31  Total Billable Time: 26 minutes     PTA Visit #: 6 (co-tx with supervising PT )   SUBJECTIVE   Pt reports: "My doctor told me he wants me to have more therapy, but that my shoulder is doing good".       She was compliant with home exercise program.  Response to previous treatment: N/A  Functional change: increased use of L UE for ADLs    Pain: 1/10 with activity   Location: left shoulder    OBJECTIVE   Objective Measures updated at progress report unless specified.   Treatment   LORRAINE received the treatments listed below:      therapeutic exercises to develop strength, endurance, ROM, flexibility, and posture. See flow-sheet below:     Therapeutic-Exercise  Reps          UBE 4 minutes each    Finger Ladder (flexion and ABD) 5 x 5" each    Ball Rolls flexion and ABD  10 x 10" each on wall and table    Bicep Curls  20  x 2#    Bent over rows  2 x 10, 2#    Bent over shoulder extension  2 x 10, 2#    Wand flexion stretch  10 x 5" , 2#    Wand ER stretch  10 x 10"    Sidelying ER/IR  2 x 10 each , 1#    Table ER stretch         Therapeutic activities to increase functional performance. See flowsheet below    Therapeutic Activities Reps    T-band Rows  2 x 10 red TB    T-band Extension  2 x 10 red TB    I,Y,Ts  2 x 10  each    Standing PNF Patterns  2 x 10 each (min A D2)  "     Patient Education and Home Exercises     Home Exercises Provided and Patient Education Provided     Education provided:   - Home exercise program, Plan of Care, Delayed onset muscle soreness     Written Home Exercises Provided: Patient instructed to cont prior HEP. Exercises were reviewed and LORRAINE was able to demonstrate them prior to the end of the session.  LORRAINE demonstrated good  understanding of the education provided. See EMR under Patient Instructions for exercises provided during therapy sessions    ASSESSMENT   Lorraine is a 68 y.o. female referred to outpatient Physical Therapy with a medical diagnosis of displaced L humerus fracture. Patient presents with L shoulder pain, decreased L shoulder ROM, and decreased L UE muscle strength and motor control. Patient's impairments are currently limiting her functional use of L UE to complete ADLs. LPTA provided pt with proper setup UBE to decrease stiffness, improve strength, and promote warmup prior to exercises. Pt continues to require moderate cueing for decreased speed, decreased compensations, and increased scapular stability to increase effectiveness of exercises.  Patient continues to demonstrate moderate compensation with PNF patterns and active range of motion. Co-treatment with supervising physical therapist, Mia Wheatley DPT.     Patient prognosis is Good.   Patient will benefit from skilled outpatient Physical Therapy to address the deficits stated above and in the chart below, provide patient /family education, and to maximize patientt's level of independence.      Plan of care discussed with patient: Yes  Patient's spiritual, cultural and educational needs considered and patient is agreeable to the plan of care and goals as stated below:      Anticipated Barriers for therapy: compliance with Home exercise program, guarding  Goals:  Short Term Goals: 2 weeks   Patient will independently complete Home exercise program with correct form.   Patient  will report decreased L shoulder pain at rest to less than or equal to 2/10 for improved quality of life.      Long Term Goals: 4 weeks   Pt will increase L shoulder flexion to 120 degrees, external rotation to C4, and internal rotation to L2 for independent dressing  Pt will increase L upper extremity to 4/5 to allow patient to perform activities of daily living independently  Pt will report decrease in L shoulder pain to 3/10 at worst to improve quality of life  Patient will have increased FOTO score to greater than or equal to 55% indicating increased function.    PLAN   Plan of care Certification: 12/28/2023 to 3/22/2024.      Outpatient Physical Therapy 2 times weekly for 4 weeks to include the following interventions: Electrical Stimulation unattended, Manual Therapy, Moist Heat/ Ice, Patient Education, Therapeutic Activities, Therapeutic Exercise, and Ultrasound.     Continue POC per PT orders to progress patient toward rehab goals.   Recert 2 times per week for 4 weeks to continue progressing toward rehab goals.   YOVANNY Rahman   3/19/2024

## 2024-03-19 NOTE — PROGRESS NOTES
"[facility]   Physical Therapy Treatment Note     Name: Lorraine Brambila  Clinic Number: [id number]    Therapy Diagnosis:   Encounter Diagnosis   Name Primary?    Acute pain of left shoulder Yes     Physician: Abdirahman Gill MD    Visit Date: 3/19/2024    Physician Orders: PT Eval and Treat   Medical Diagnosis from Referral: displaced fracture of L humerus  Evaluation Date: 12/28/2023  Authorization Period Expiration: 11/26/2024  Plan of Care Expiration: 2/23/2024  Progress Note Due:2/23/2024  Date of Surgery: NA   Visit # / Visits authorized: 27/32  FOTO: to be completed on visit 26     Precautions: Standard and Fall      Time In: 11:32  Time Out:  11:40  Total Billable Time: 8 minutes     PTA Visit #: 0/5  SUBJECTIVE   Pt reports: "My doctor wants me to do some more therapy and I know that I need it to keep getting stronger."     She was compliant with home exercise program.  Response to previous treatment: N/A  Functional change: increased use of L UE for ADLs    Pain: 1/10 with activity   Location: left shoulder    OBJECTIVE   Objective Measures updated at progress report unless specified.   Treatment   LORRAINE received the treatments listed below:      therapeutic exercises to develop strength, endurance, ROM, flexibility, and posture for 8 minutes. See flowsheet below: remainder of treatment was completed by LPTA     Therapeutic-Exercise  Reps          UBE 4 minutes each    Finger Ladder (flexion and ABD) 5 x 5" each    Ball Rolls flexion and ABD  10 x 10" each on wall and table    Bicep Curls  20  x 2# (PT)   Bent over rows  2 x 10, 2# (PT)   Bent over shoulder extension  2 x 10, 2#  (PT)    Wand flexion stretch  10 x 5" , 2#    Wand ER stretch  10 x 10"    Sidelying ER/IR  2 x 10 each , 1#    Table ER stretch         Completed by LPTA Therapeutic activities to increase functional performance including: See flowsheet below    Therapeutic Activities Reps    T-band Rows  2 x 10 " red TB    T-band Extension  2 x 10 red TB    I,Y,Ts  2 x 10  each    Standing PNF Patterns  2 x 10 each (min A D2)      Patient Education and Home Exercises     Home Exercises Provided and Patient Education Provided     Education provided:   - Home exercise program, Plan of Care, Delayed onset muscle soreness     Written Home Exercises Provided: Patient instructed to cont prior HEP. Exercises were reviewed and LORRAINE was able to demonstrate them prior to the end of the session.  LORRAINE demonstrated good  understanding of the education provided. See EMR under Patient Instructions for exercises provided during therapy sessions    ASSESSMENT   Lorraine is a 68 y.o. female referred to outpatient Physical Therapy with a medical diagnosis of displaced L humerus fracture. Patient presents with L shoulder pain, decreased L shoulder ROM, and decreased L UE muscle strength and motor control. Patient's impairments are currently limiting her functional use of L UE to complete ADLs. PT completed last part of patient's treatment session and completed LPTA supervisory visit and case conference regarding patient's progress. PT noted that patient continues to require cueing to increase scapular stability with prone exercises to improve form. Patient's last visit in this PT plan of care is scheduled for Thursday of this week. PT plans to recert patient at that visit due to remaining deficits and continue progressing per MD orders.     Patient prognosis is Good.   Patient will benefit from skilled outpatient Physical Therapy to address the deficits stated above and in the chart below, provide patient /family education, and to maximize patientt's level of independence.      Plan of care discussed with patient: Yes  Patient's spiritual, cultural and educational needs considered and patient is agreeable to the plan of care and goals as stated below:      Anticipated Barriers for therapy: compliance with Home exercise program,  guarding  Goals:  Short Term Goals: 2 weeks   Patient will independently complete Home exercise program with correct form.   Patient will report decreased L shoulder pain at rest to less than or equal to 2/10 for improved quality of life.      Long Term Goals: 4 weeks   Pt will increase L shoulder flexion to 120 degrees, external rotation to C4, and internal rotation to L2 for independent dressing  Pt will increase L upper extremity to 4/5 to allow patient to perform activities of daily living independently  Pt will report decrease in L shoulder pain to 3/10 at worst to improve quality of life  Patient will have increased FOTO score to greater than or equal to 55% indicating increased function.    PLAN   Plan of care Certification: 12/28/2023 to 3/22/2024.      Outpatient Physical Therapy 2 times weekly for 4 weeks to include the following interventions: Electrical Stimulation unattended, Manual Therapy, Moist Heat/ Ice, Patient Education, Therapeutic Activities, Therapeutic Exercise, and Ultrasound.     Continue POC per PT orders to progress patient toward rehab goals.   Mia Wheatley, PT, DPT     3/19/2024

## 2024-03-21 ENCOUNTER — CLINICAL SUPPORT (OUTPATIENT)
Dept: REHABILITATION | Facility: HOSPITAL | Age: 69
End: 2024-03-21
Payer: MEDICARE

## 2024-03-21 DIAGNOSIS — M25.512 ACUTE PAIN OF LEFT SHOULDER: Primary | ICD-10-CM

## 2024-03-21 PROCEDURE — 97530 THERAPEUTIC ACTIVITIES: CPT

## 2024-03-21 PROCEDURE — 97110 THERAPEUTIC EXERCISES: CPT

## 2024-03-21 NOTE — PROGRESS NOTES
"[facility]   Physical Therapy Treatment Note     Name: Lorraine Brambila  Clinic Number: [id number]    Therapy Diagnosis: L shoulder pain     Physician: Abdirahman Gill MD    Visit Date: 3/21/2024    Physician Orders: PT Eval and Treat   Medical Diagnosis from Referral: displaced fracture of L humerus  Evaluation Date: 12/28/2023  Authorization Period Expiration: 11/26/2024  Plan of Care Expiration: 2/23/2024  Progress Note Due:2/23/2024  Date of Surgery: NA   Visit # / Visits authorized: 28/40  FOTO: to be completed on visit 32     Precautions: Standard and Fall      Time In: 11:02  Time Out: 11:38  Total Billable Time: 36 minutes     PTA Visit #: 0/5  SUBJECTIVE   Pt reports: "It's aching a little because I was organizing my closet."     She was compliant with home exercise program.  Response to previous treatment: N/A  Functional change: increased use of L UE for ADLs    Pain: 1/10 with activity   Location: left shoulder    OBJECTIVE   Objective Measures updated at progress report unless specified.   Treatment   LORRAINE received the treatments listed below:      therapeutic exercises to develop strength, endurance, ROM, flexibility, and posture for 8 minutes. See flowsheet below:     Therapeutic-Exercise  Reps          UBE (fwd and back)  4 minutes each    Finger Ladder (flexion and ABD) 5 x 5" each    Ball Rolls flexion and ABD  10 x 10" each on wall and table    Bicep Curls  20  x 2#    Bent over rows  2 x 10, 2#    Bent over shoulder extension  2 x 10, 2#     Wand flexion stretch  10 x 5" , 2#    Wand ER stretch  10 x 10"    Sidelying ER/IR  2 x 10 each , 1#    Table ER stretch         Completed by LPTA Therapeutic activities to increase functional performance including: See flowsheet below    Therapeutic Activities Reps    T-band Rows  2 x 10 red TB    T-band Extension  2 x 10 red TB    I,Y,Ts  2 x 10  each    Standing PNF Patterns  2 x 10 each (min A D2)      Patient Education " and Home Exercises     Home Exercises Provided and Patient Education Provided     Education provided:   - Home exercise program, Plan of Care, Delayed onset muscle soreness     Written Home Exercises Provided: Patient instructed to cont prior HEP. Exercises were reviewed and LORRAINE was able to demonstrate them prior to the end of the session.  LORRAINE demonstrated good  understanding of the education provided. See EMR under Patient Instructions for exercises provided during therapy sessions    ASSESSMENT   Lorraine is a 68 y.o. female referred to outpatient Physical Therapy with a medical diagnosis of displaced L humerus fracture. Patient presents with L shoulder pain, decreased L shoulder ROM, and decreased L UE muscle strength and motor control. Patient's impairments are currently limiting her functional use of L UE to complete ADLs. PT provided patient with visual verbal and tactile cueing throughout treatment for improved scapular stability, decreased compensations, and decreased speed to improve form. Patient continues to progress through therapy tasks quickly without proper hold times or reps. PT provided patient education on importance of completion of therapy tasks to promote return to increased independence and use of L UE for ADLs. PT noted continued difficulty with ABD ROM tasks. PT instructed patient in sidelying shoulder ABD to increased ROM with less resistance from gravity. PT completed patient goal assessment for last scheduled visit and recerted patient two times per week for four more weeks to continue progressing toward rehab goals.     Patient prognosis is Good.   Patient will benefit from skilled outpatient Physical Therapy to address the deficits stated above and in the chart below, provide patient /family education, and to maximize patientt's level of independence.      Plan of care discussed with patient: Yes  Patient's spiritual, cultural and educational needs considered and patient is agreeable  to the plan of care and goals as stated below:      Anticipated Barriers for therapy: compliance with Home exercise program, guarding  Goals:  Short Term Goals: 2 weeks   Patient will independently complete Home exercise program with correct form.   Patient will report decreased L shoulder pain at rest to less than or equal to 2/10 for improved quality of life.      Long Term Goals: 4 weeks   Pt will increase L shoulder flexion to 120 degrees, external rotation to C4, and internal rotation to L2 for independent dressing  Pt will increase L upper extremity to 4/5 to allow patient to perform activities of daily living independently  Pt will report decrease in L shoulder pain to 3/10 at worst to improve quality of life  Patient will have increased FOTO score to greater than or equal to 55% indicating increased function.    PLAN   Plan of care Certification: 12/28/2023 to 3/22/2024.      Outpatient Physical Therapy 2 times weekly for 4 weeks to include the following interventions: Electrical Stimulation unattended, Manual Therapy, Moist Heat/ Ice, Patient Education, Therapeutic Activities, Therapeutic Exercise, and Ultrasound.     Continue POC per PT orders to progress patient toward rehab goals.   Mia Wheatley, PT, DPT     3/21/2024

## 2024-03-21 NOTE — PLAN OF CARE
[facility]  Physical Therapy Plan of Care Note     Name: Lorraine Brambila  Clinic Number: [id number]    Therapy Diagnosis:   Encounter Diagnosis   Name Primary?    Acute pain of left shoulder Yes     Physician: Abdirahman Gill MD    Visit Date: 3/21/2024    Therapy Diagnosis: L shoulder pain     Physician: Abdirahman Gill MD    Visit Date: 3/21/2024    Physician Orders: PT Eval and Treat   Medical Diagnosis from Referral: displaced fracture of L humerus  Evaluation Date: 12/28/2023  Authorization Period Expiration: 11/26/2024  Plan of Care Expiration:4/19 /2024  Progress Note Due:4/19/2024  Date of Surgery: NA   Visit # / Visits authorized: 28/40  FOTO: to be completed on visit 32    Precautions: Standard  Functional Level Prior to Evaluation:  Independent     Subjective     Update: Patient reports occasional L shoulder pain with increased activity levels    Objective      Update: Patient continues to have deficits in L shoulder active range of motion and strength.     Assessment     Update: Patient can benefit from continued skilled rehab services to continue addressing remaining deficits and progress toward rehab goals per MD orders.     Previous Short Term Goals Status:     Short Term Goals: 2 weeks   Patient will independently complete Home exercise program with correct form. -MET  Patient will report decreased L shoulder pain at rest to less than or equal to 2/10 for improved quality of life. -MET   Long Term Goal Status: continue per initial plan of care.  Reasons for Recertification of Therapy:   continue progressing toward rehab goals per MD order    GOALS  Pt will increase L shoulder flexion to 120 degrees, external rotation to C4, and internal rotation to L2 for independent dressing-Goal in progress 90 degrees flexion, ER to C4 functional reach and IR: L4 functional reach   Pt will increase L upper extremity to 4/5 to allow patient to perform activities of daily  living independently  Pt will report decrease in L shoulder pain to 3/10 at worst to improve quality of life-MET   Patient will have increased FOTO score to greater than or equal to 55% indicating increased function.-MET 62%    Plan     Updated Certification Period: 3/21/2024 to 4/19/2024   Recommended Treatment Plan: 2 times per week for 4 weeks:  Electrical Stimulation unattended, Moist Heat/ Ice, Neuromuscular Re-ed, Patient Education, Therapeutic Activities, and Therapeutic Exercise    Mia Wheatley, PT, DPT

## 2024-03-22 RX ORDER — ATORVASTATIN CALCIUM 40 MG/1
40 TABLET, FILM COATED ORAL NIGHTLY
Qty: 90 TABLET | Refills: 0 | Status: SHIPPED | OUTPATIENT
Start: 2024-03-22

## 2024-03-28 ENCOUNTER — CLINICAL SUPPORT (OUTPATIENT)
Dept: REHABILITATION | Facility: HOSPITAL | Age: 69
End: 2024-03-28
Payer: MEDICARE

## 2024-03-28 DIAGNOSIS — M25.512 ACUTE PAIN OF LEFT SHOULDER: Primary | ICD-10-CM

## 2024-03-28 PROCEDURE — 97530 THERAPEUTIC ACTIVITIES: CPT | Mod: CQ

## 2024-03-28 PROCEDURE — 97110 THERAPEUTIC EXERCISES: CPT | Mod: CQ

## 2024-03-28 NOTE — PROGRESS NOTES
"[facility]   Physical Therapy Treatment Note     Name: Lorraine Brambila  Clinic Number: [id number]    Therapy Diagnosis: L shoulder pain     Physician: Abdirahman Gill MD    Visit Date: 3/28/2024    Physician Orders: PT Eval and Treat   Medical Diagnosis from Referral: displaced fracture of L humerus  Evaluation Date: 12/28/2023  Authorization Period Expiration: 11/26/2024  Plan of Care Expiration: 2/23/2024  Progress Note Due:2/23/2024  Date of Surgery: NA   Visit # / Visits authorized: 29/40  FOTO: to be completed on visit 32     Precautions: Standard and Fall      Time In: 14:45  Time Out: 15:22  Total Billable Time: 38 minutes     PTA Visit #: 1/5  SUBJECTIVE   Pt reports: "I'm not hurting today."     She was compliant with home exercise program.  Response to previous treatment: N/A  Functional change: increased use of L UE for ADLs    Pain: 1/10 with activity   Location: left shoulder    OBJECTIVE   Objective Measures updated at progress report unless specified.   Treatment   LORRAINE received the treatments listed below:      therapeutic exercises to develop strength, endurance, ROM, flexibility, and posture for 8 minutes. See flowsheet below:     Therapeutic-Exercise  Reps          UBE (fwd and back)  4 minutes each    Finger Ladder (flexion and ABD) 5 x 5" each    Ball Rolls flexion and ABD  10 x 10" each on wall and table    Bicep Curls  20  x 2#    Bent over rows  2 x 10, 2#    Bent over shoulder extension  2 x 10, 2#     Wand flexion stretch  10 x 5" , 2#    Wand ER stretch  10 x 10"    Sidelying ER/IR  2 x 10 each , 1#    Table ER stretch         Therapeutic activities to increase functional performance including: See flowsheet below    Therapeutic Activities Reps    T-band Rows  2 x 10 red TB    T-band Extension  2 x 10 red TB    I,Y,Ts  2 x 10  each    Standing PNF Patterns  2 x 10 each (min A D2)      Patient Education and Home Exercises     Home Exercises Provided and " Patient Education Provided     Education provided:   - Home exercise program, Plan of Care, Delayed onset muscle soreness     Written Home Exercises Provided: Patient instructed to cont prior HEP. Exercises were reviewed and LORRAINE was able to demonstrate them prior to the end of the session.  LORRAINE demonstrated good  understanding of the education provided. See EMR under Patient Instructions for exercises provided during therapy sessions    ASSESSMENT   Lorraine is a 68 y.o. female referred to outpatient Physical Therapy with a medical diagnosis of displaced L humerus fracture. Patient presents with L shoulder pain, decreased L shoulder ROM, and decreased L UE muscle strength and motor control. Patient's impairments are currently limiting her functional use of L UE to complete ADLs. LPTA prompted pt with visual verbal and tactile cueing throughout tx for improved scapular stability, decreased compensations, and decreased speed to improve form. Pt continues to progress through therapy tasks quickly without proper hold times or repetitions despite moderate cueing. Pt educated on performing exercises properly with correct body mechanics to improve functionality with ADL. Pt displays some improved in abduction range of motion against gravity. No adverse effects noted.     Patient prognosis is Good.   Patient will benefit from skilled outpatient Physical Therapy to address the deficits stated above and in the chart below, provide patient /family education, and to maximize patientt's level of independence.      Plan of care discussed with patient: Yes  Patient's spiritual, cultural and educational needs considered and patient is agreeable to the plan of care and goals as stated below:      Anticipated Barriers for therapy: compliance with Home exercise program, guarding  Goals:  Short Term Goals: 2 weeks   Patient will independently complete Home exercise program with correct form.   Patient will report decreased L shoulder  pain at rest to less than or equal to 2/10 for improved quality of life.      Long Term Goals: 4 weeks   Pt will increase L shoulder flexion to 120 degrees, external rotation to C4, and internal rotation to L2 for independent dressing  Pt will increase L upper extremity to 4/5 to allow patient to perform activities of daily living independently  Pt will report decrease in L shoulder pain to 3/10 at worst to improve quality of life  Patient will have increased FOTO score to greater than or equal to 55% indicating increased function.    PLAN   Plan of care Certification: 12/28/2023 to 3/22/2024.      Outpatient Physical Therapy 2 times weekly for 4 weeks to include the following interventions: Electrical Stimulation unattended, Manual Therapy, Moist Heat/ Ice, Patient Education, Therapeutic Activities, Therapeutic Exercise, and Ultrasound.     Continue POC per PT orders to progress patient toward rehab goals.   YOVANNY Sanchez  3/28/2024

## 2024-04-02 ENCOUNTER — CLINICAL SUPPORT (OUTPATIENT)
Dept: REHABILITATION | Facility: HOSPITAL | Age: 69
End: 2024-04-02
Payer: MEDICARE

## 2024-04-02 DIAGNOSIS — M25.512 ACUTE PAIN OF LEFT SHOULDER: Primary | ICD-10-CM

## 2024-04-02 PROCEDURE — 97110 THERAPEUTIC EXERCISES: CPT | Mod: CQ

## 2024-04-02 PROCEDURE — 97530 THERAPEUTIC ACTIVITIES: CPT | Mod: CQ

## 2024-04-02 NOTE — PROGRESS NOTES
"[facility]   Physical Therapy Treatment Note     Name: Lorraine Brambila  Clinic Number: [id number]    Therapy Diagnosis: L shoulder pain     Physician: Abdirahman Gill MD    Visit Date: 4/2/2024    Physician Orders: PT Eval and Treat   Medical Diagnosis from Referral: displaced fracture of L humerus  Evaluation Date: 12/28/2023  Authorization Period Expiration: 11/26/2024  Plan of Care Expiration: 2/23/2024  Progress Note Due:2/23/2024  Date of Surgery: NA   Visit # / Visits authorized: 30/40  FOTO: to be completed on visit 32     Precautions: Standard and Fall      Time In: 10:15  Time Out: 10:57  Total Billable Time:  42 minutes     PTA Visit #: 2/5  SUBJECTIVE     Pt reports: "My shoulder is getting so much better".      She was compliant with home exercise program.  Response to previous treatment: N/A  Functional change: increased use of L UE for ADLs    Pain: 1/10 with activity   Location: left shoulder    OBJECTIVE   Objective Measures updated at progress report unless specified.   Treatment   LORRAINE received the treatments listed below:      therapeutic exercises to develop strength, endurance, ROM, flexibility, and posture. See flowsheet below:     Therapeutic-Exercise  Reps          UBE (fwd and back)  4 minutes each    Finger Ladder (flexion and ABD) 5 x 5" each    Ball Rolls flexion and ABD  10 x 10" each on wall and table    Bicep Curls  20  x 2#    Bent over rows  2 x 10, 2#    Bent over shoulder extension  2 x 10, 2#     Wand flexion stretch  10 x 5" , 2#    Wand ER stretch  10 x 10"    Sidelying ER/IR  2 x 10 each , 1#    Table ER stretch         Therapeutic activities to increase functional performance including: See flowsheet below    Therapeutic Activities Reps    T-band Rows  2 x 10 red TB    T-band Extension  2 x 10 red TB    I,Y,Ts  2 x 10  each    Standing PNF Patterns  2 x 10 each (min A D2)      Patient Education and Home Exercises     Home Exercises Provided " and Patient Education Provided     Education provided:   - Home exercise program, Plan of Care, Delayed onset muscle soreness     Written Home Exercises Provided: Patient instructed to cont prior HEP. Exercises were reviewed and LORRAINE was able to demonstrate them prior to the end of the session.  LORRAINE demonstrated good  understanding of the education provided. See EMR under Patient Instructions for exercises provided during therapy sessions    ASSESSMENT   Lorraine is a 68 y.o. female referred to outpatient Physical Therapy with a medical diagnosis of displaced L humerus fracture. Patient presents with L shoulder pain, decreased L shoulder ROM, and decreased L UE muscle strength and motor control. Patient's impairments are currently limiting her functional use of L UE to complete ADLs. LPTA prompted pt with visual verbal and tactile cueing throughout tx for improved scapular stability, decreased compensations, and decreased speed to improve form. Pt continues to progress through therapy tasks quickly without proper hold times or repetitions despite moderate cueing. Pt educated on performing exercises properly with correct body mechanics to improve functionality with ADL. Pt demonstrates min shoulder hiking during completion of active range of motion flexion, scaption, and abduction.     Patient prognosis is Good.   Patient will benefit from skilled outpatient Physical Therapy to address the deficits stated above and in the chart below, provide patient /family education, and to maximize patientt's level of independence.      Plan of care discussed with patient: Yes  Patient's spiritual, cultural and educational needs considered and patient is agreeable to the plan of care and goals as stated below:      Anticipated Barriers for therapy: compliance with Home exercise program, guarding  Goals:  Short Term Goals: 2 weeks   Patient will independently complete Home exercise program with correct form.   Patient will report  decreased L shoulder pain at rest to less than or equal to 2/10 for improved quality of life.      Long Term Goals: 4 weeks   Pt will increase L shoulder flexion to 120 degrees, external rotation to C4, and internal rotation to L2 for independent dressing  Pt will increase L upper extremity to 4/5 to allow patient to perform activities of daily living independently  Pt will report decrease in L shoulder pain to 3/10 at worst to improve quality of life  Patient will have increased FOTO score to greater than or equal to 55% indicating increased function.    PLAN   Plan of care Certification: 12/28/2023 to 3/22/2024.      Outpatient Physical Therapy 2 times weekly for 4 weeks to include the following interventions: Electrical Stimulation unattended, Manual Therapy, Moist Heat/ Ice, Patient Education, Therapeutic Activities, Therapeutic Exercise, and Ultrasound.     Continue POC per PT orders to progress patient toward rehab goals.   YOVANNY Rahman   4/2/2024

## 2024-04-05 ENCOUNTER — HOSPITAL ENCOUNTER (OUTPATIENT)
Dept: RADIOLOGY | Facility: HOSPITAL | Age: 69
Discharge: HOME | End: 2024-04-05
Attending: SURGERY
Payer: MEDICARE

## 2024-04-05 ENCOUNTER — OFFICE VISIT (OUTPATIENT)
Dept: VASCULAR SURGERY | Facility: CLINIC | Age: 69
End: 2024-04-05
Payer: MEDICARE

## 2024-04-05 VITALS — HEIGHT: 66 IN | WEIGHT: 84 LBS | BODY MASS INDEX: 13.5 KG/M2

## 2024-04-05 DIAGNOSIS — I71.43 INFRARENAL ABDOMINAL AORTIC ANEURYSM (AAA) WITHOUT RUPTURE: ICD-10-CM

## 2024-04-05 DIAGNOSIS — I71.40 ABDOMINAL AORTIC ANEURYSM (AAA) WITHOUT RUPTURE, UNSPECIFIED PART: ICD-10-CM

## 2024-04-05 DIAGNOSIS — I71.43 INFRARENAL ABDOMINAL AORTIC ANEURYSM (AAA) WITHOUT RUPTURE: Primary | ICD-10-CM

## 2024-04-05 PROCEDURE — 99213 OFFICE O/P EST LOW 20 MIN: CPT | Mod: PBBFAC,25 | Performed by: NURSE PRACTITIONER

## 2024-04-05 PROCEDURE — 99212 OFFICE O/P EST SF 10 MIN: CPT | Mod: S$PBB,,, | Performed by: NURSE PRACTITIONER

## 2024-04-05 PROCEDURE — 76775 US EXAM ABDO BACK WALL LIM: CPT | Mod: 26,,, | Performed by: SURGERY

## 2024-04-05 PROCEDURE — 76775 US EXAM ABDO BACK WALL LIM: CPT | Mod: TC

## 2024-04-05 NOTE — PROGRESS NOTES
Vascular    Aaa 3 cm no significant change    Educated patient    Discussed bp will keep diary and work with dr. Orellana    Duplex one year

## 2024-04-09 ENCOUNTER — CLINICAL SUPPORT (OUTPATIENT)
Dept: REHABILITATION | Facility: HOSPITAL | Age: 69
End: 2024-04-09
Payer: MEDICARE

## 2024-04-09 DIAGNOSIS — M25.512 ACUTE PAIN OF LEFT SHOULDER: Primary | ICD-10-CM

## 2024-04-09 PROCEDURE — 97110 THERAPEUTIC EXERCISES: CPT | Mod: CQ

## 2024-04-09 PROCEDURE — 97530 THERAPEUTIC ACTIVITIES: CPT | Mod: CQ

## 2024-04-09 NOTE — PROGRESS NOTES
"[facility]   Physical Therapy Treatment Note     Name: Lorraine Brambila  Clinic Number: [id number]    Therapy Diagnosis: L shoulder pain     Physician: Abdirahman Gill MD    Visit Date: 4/9/2024    Physician Orders: PT Eval and Treat   Medical Diagnosis from Referral: displaced fracture of L humerus  Evaluation Date: 12/28/2023  Authorization Period Expiration: 11/26/2024  Plan of Care Expiration: 2/23/2024  Progress Note Due:2/23/2024  Date of Surgery: NA   Visit # / Visits authorized: 31/40  FOTO: to be completed on visit 32     Precautions: Standard and Fall      Time In: 10:58  Time Out: 11:28  Total Billable Time: 30 minutes     PTA Visit #: 3/5  SUBJECTIVE     Pt reports: "I can do more with this shoulder".      She was compliant with home exercise program.  Response to previous treatment: N/A  Functional change: increased use of L UE for ADLs    Pain: 1/10 with activity   Location: left shoulder    OBJECTIVE   Objective Measures updated at progress report unless specified.   Treatment   LORRAINE received the treatments listed below:      therapeutic exercises to develop strength, endurance, ROM, flexibility, and posture. See flowsheet below:     Therapeutic-Exercise  Reps          UBE (fwd and back)  4 minutes each    Finger Ladder (flexion and ABD) 5 x 5" each    Ball Rolls flexion and ABD  10 x 10" each on wall and table    Bicep Curls  20  x 2#    Bent over rows  2 x 10, 2#    Bent over shoulder extension  2 x 10, 2#     Wand flexion stretch  10 x 5" , 2#    Wand ER stretch  10 x 10"    Sidelying ER/IR  2 x 10 each , 1#    Table ER stretch         Therapeutic activities to increase functional performance including: See flowsheet below    Therapeutic Activities Reps    T-band Rows  2 x 10 red TB    T-band Extension  2 x 10 red TB    I,Y,Ts  2 x 10  each    Standing PNF Patterns  2 x 10 each (min A D2)      Patient Education and Home Exercises     Home Exercises Provided and " Patient Education Provided     Education provided:   - Home exercise program, Plan of Care, Delayed onset muscle soreness     Written Home Exercises Provided: Patient instructed to cont prior HEP. Exercises were reviewed and LORRAINE was able to demonstrate them prior to the end of the session.  LORRAINE demonstrated good  understanding of the education provided. See EMR under Patient Instructions for exercises provided during therapy sessions    ASSESSMENT   Lorraine is a 68 y.o. female referred to outpatient Physical Therapy with a medical diagnosis of displaced L humerus fracture. Patient presents with L shoulder pain, decreased L shoulder ROM, and decreased L UE muscle strength and motor control. Patient's impairments are currently limiting her functional use of L UE to complete ADLs. LPTA prompted pt with visual verbal and tactile cueing throughout tx for improved scapular stability, decreased compensations, and decreased speed to improve form. Pt continues to progress through therapy tasks quickly without proper hold times or repetitions despite moderate cueing. Pt displays improvement with PNF patterns. No adverse effects noted.     Patient prognosis is Good.   Patient will benefit from skilled outpatient Physical Therapy to address the deficits stated above and in the chart below, provide patient /family education, and to maximize patientt's level of independence.      Plan of care discussed with patient: Yes  Patient's spiritual, cultural and educational needs considered and patient is agreeable to the plan of care and goals as stated below:      Anticipated Barriers for therapy: compliance with Home exercise program, guarding  Goals:  Short Term Goals: 2 weeks   Patient will independently complete Home exercise program with correct form.   Patient will report decreased L shoulder pain at rest to less than or equal to 2/10 for improved quality of life.      Long Term Goals: 4 weeks   Pt will increase L shoulder  flexion to 120 degrees, external rotation to C4, and internal rotation to L2 for independent dressing  Pt will increase L upper extremity to 4/5 to allow patient to perform activities of daily living independently  Pt will report decrease in L shoulder pain to 3/10 at worst to improve quality of life  Patient will have increased FOTO score to greater than or equal to 55% indicating increased function.    PLAN   Plan of care Certification: 12/28/2023 to 3/22/2024.      Outpatient Physical Therapy 2 times weekly for 4 weeks to include the following interventions: Electrical Stimulation unattended, Manual Therapy, Moist Heat/ Ice, Patient Education, Therapeutic Activities, Therapeutic Exercise, and Ultrasound.     Continue POC per PT orders to progress patient toward rehab goals.   YOVANNY Sanchez   4/9/2024

## 2024-04-16 ENCOUNTER — CLINICAL SUPPORT (OUTPATIENT)
Dept: REHABILITATION | Facility: HOSPITAL | Age: 69
End: 2024-04-16
Payer: MEDICARE

## 2024-04-16 DIAGNOSIS — M25.512 ACUTE PAIN OF LEFT SHOULDER: Primary | ICD-10-CM

## 2024-04-16 PROCEDURE — 97110 THERAPEUTIC EXERCISES: CPT | Mod: CQ

## 2024-04-16 PROCEDURE — 97530 THERAPEUTIC ACTIVITIES: CPT | Mod: CQ

## 2024-04-16 NOTE — PROGRESS NOTES
"[facility]   Physical Therapy Treatment Note     Name: Lorraine Brambila  Clinic Number: [id number]    Therapy Diagnosis: L shoulder pain     Physician: Abdirahman Gill MD    Visit Date: 4/16/2024    Physician Orders: PT Eval and Treat   Medical Diagnosis from Referral: displaced fracture of L humerus  Evaluation Date: 12/28/2023  Authorization Period Expiration: 11/26/2024  Plan of Care Expiration: 2/23/2024  Progress Note Due:2/23/2024  Date of Surgery: NA   Visit # / Visits authorized:32/40  FOTO: to be completed on visit 32     Precautions: Standard and Fall      Time In: 11:00  Time Out: 11:41  Total Billable Time: 41 minutes     PTA Visit #: 4/5  SUBJECTIVE     Pt reports: "My shoulder is doing good.  This therapy has helped me so much".  Patient denies pain in Left shoulder, and states "I get stiff but not really any pain".     She was compliant with home exercise program.  Response to previous treatment: N/A  Functional change: increased use of L UE for ADLs    Pain: 0/10 with activity   Location: left shoulder    OBJECTIVE   Objective Measures updated at progress report unless specified.   Treatment   LORRAINE received the treatments listed below:      therapeutic exercises to develop strength, endurance, ROM, flexibility, and posture. See flowsheet below:     Therapeutic-Exercise  Reps          UBE (fwd and back)  4 minutes each    Finger Ladder (flexion and ABD) 5 x 5" each    Ball Rolls flexion and ABD  10 x 10" each on wall and table    Bicep Curls  20  x 2#    Bent over rows  2 x 10, 2#    Bent over shoulder extension  2 x 10, 2#     Wand flexion stretch  10 x 5" , 2#    Wand ER stretch  10 x 10"    Sidelying ER/IR  2 x 10 each , 1#    Table ER stretch         Therapeutic activities to increase functional performance including: See flowsheet below    Therapeutic Activities Reps    T-band Rows  2 x 10 red TB    T-band Extension  2 x 10 red TB    I,Y,Ts  2 x 10  each  "   Standing PNF Patterns  2 x 10 each (min A D2)      Patient Education and Home Exercises     Home Exercises Provided and Patient Education Provided     Education provided:   - Home exercise program, Plan of Care, Delayed onset muscle soreness     Written Home Exercises Provided: Patient instructed to cont prior HEP. Exercises were reviewed and LORRAINE was able to demonstrate them prior to the end of the session.  LORRAINE demonstrated good  understanding of the education provided. See EMR under Patient Instructions for exercises provided during therapy sessions    ASSESSMENT   Lorraine is a 68 y.o. female referred to outpatient Physical Therapy with a medical diagnosis of displaced L humerus fracture. Patient presents with L shoulder pain, decreased L shoulder ROM, and decreased L UE muscle strength and motor control. Patient's impairments are currently limiting her functional use of L UE to complete ADLs. LPTA prompted pt with visual verbal and tactile cueing throughout tx for improved scapular stability, decreased compensations, and decreased speed to improve form. Pt is more mindful of verbal and visual cues to decrease speed of movement, proper count, and hold times during today's physical therapy treatment session.  Very minimal shoulder hiking noted during completion of active range of motion flexion, scaption, and abduction.  Patient demonstrated improved strength and functional mobility.  No adverse effects noted.         Patient prognosis is Good.   Patient will benefit from skilled outpatient Physical Therapy to address the deficits stated above and in the chart below, provide patient /family education, and to maximize patientt's level of independence.      Plan of care discussed with patient: Yes  Patient's spiritual, cultural and educational needs considered and patient is agreeable to the plan of care and goals as stated below:      Anticipated Barriers for therapy: compliance with Home exercise program,  guarding  Goals:  Short Term Goals: 2 weeks   Patient will independently complete Home exercise program with correct form.   Patient will report decreased L shoulder pain at rest to less than or equal to 2/10 for improved quality of life.      Long Term Goals: 4 weeks   Pt will increase L shoulder flexion to 120 degrees, external rotation to C4, and internal rotation to L2 for independent dressing  Pt will increase L upper extremity to 4/5 to allow patient to perform activities of daily living independently  Pt will report decrease in L shoulder pain to 3/10 at worst to improve quality of life  Patient will have increased FOTO score to greater than or equal to 55% indicating increased function.    PLAN   Plan of care Certification: 12/28/2023 to 3/22/2024.      Outpatient Physical Therapy 2 times weekly for 4 weeks to include the following interventions: Electrical Stimulation unattended, Manual Therapy, Moist Heat/ Ice, Patient Education, Therapeutic Activities, Therapeutic Exercise, and Ultrasound.     Continue POC per PT orders to progress patient toward rehab goals.   YOVANNY Rahman   4/16/2024

## 2024-04-18 ENCOUNTER — CLINICAL SUPPORT (OUTPATIENT)
Dept: REHABILITATION | Facility: HOSPITAL | Age: 69
End: 2024-04-18
Payer: MEDICARE

## 2024-04-18 ENCOUNTER — DOCUMENTATION ONLY (OUTPATIENT)
Dept: REHABILITATION | Facility: HOSPITAL | Age: 69
End: 2024-04-18
Payer: MEDICARE

## 2024-04-18 DIAGNOSIS — M25.512 ACUTE PAIN OF LEFT SHOULDER: Primary | ICD-10-CM

## 2024-04-18 PROCEDURE — 97530 THERAPEUTIC ACTIVITIES: CPT | Mod: CQ

## 2024-04-18 PROCEDURE — 97110 THERAPEUTIC EXERCISES: CPT | Mod: CQ

## 2024-04-18 NOTE — PROGRESS NOTES
[facility]  PT Discharge Note    Name: Lorraine Brambila  [facility] Number: [id number]    Therapy Diagnosis: L shoulder pain      Physician: Abdirahman Gill MD  Physician Orders: PT Eval and Treat   Medical Diagnosis from Referral: displaced fracture of L humerus  Evaluation Date: 12/28/2023  Authorization Period Expiration: 11/26/2024  Plan of Care Expiration: 2/23/2024  Progress Note Due:2/23/2024  Date of Surgery: NA   Date of Last visit: 4/18/2024  Total Visits Received: 33    ASSESSMENT      Patient demonstrates increased L shoulder active range of motion and strength resulting in increased functional use of L UE to complete ADLs.     Discharge reason: Patient has met all of her goals    Discharge FOTO Score: 85%    Goals:   Patient will independently complete Home exercise program with correct form. -MET   Patient will report decreased L shoulder pain at rest to less than or equal to 2/10 for improved quality of life. -MET      Long Term Goals: 4 weeks   Pt will increase L shoulder flexion to 120 degrees, external rotation to C4, and internal rotation to L2 for independent dressing-MET   Pt will increase L upper extremity to 4/5 to allow patient to perform activities of daily living independently-MET   Pt will report decrease in L shoulder pain to 3/10 at worst to improve quality of life-MET   Patient will have increased FOTO score to greater than or equal to 55% indicating increased function.-MET   PLAN   This patient is discharged from Physical Therapy    Mia Wheatley, PT,DPT

## 2024-04-18 NOTE — PROGRESS NOTES
"[facility]   Physical Therapy Treatment Note     Name: Lorraine Brambila  Clinic Number: [id number]    Therapy Diagnosis: L shoulder pain     Physician: Abdirahman Gill MD    Visit Date: 4/18/2024    Physician Orders: PT Eval and Treat   Medical Diagnosis from Referral: displaced fracture of L humerus  Evaluation Date: 12/28/2023  Authorization Period Expiration: 11/26/2024  Plan of Care Expiration: 2/23/2024  Progress Note Due:2/23/2024  Date of Surgery: NA   Visit # / Visits authorized: 33/40  FOTO: to be completed on visit 32     Precautions: Standard and Fall      Time In: 10:13  Time Out: 10:50  Total Billable Time: 37 minutes     PTA Visit #: 5/5  SUBJECTIVE     Pt reports: No new complaints     She was compliant with home exercise program.  Response to previous treatment: N/A  Functional change: increased use of L UE for ADLs    Pain: 0/10 with activity   Location: left shoulder    OBJECTIVE   Objective Measures updated at progress report unless specified.   Treatment   LORRAINE received the treatments listed below:      therapeutic exercises to develop strength, endurance, ROM, flexibility, and posture. See flowsheet below:     Therapeutic-Exercise  Reps          UBE (fwd and back)  4 minutes each    Finger Ladder (flexion and ABD) 5 x 5" each    Ball Rolls flexion and ABD  10 x 10" each on wall and table    Bicep Curls  20  x 2#    Bent over rows  2 x 10, 2#    Bent over shoulder extension  2 x 10, 2#     Wand flexion stretch  10 x 5" , 2#    Wand ER stretch  10 x 10"    Sidelying ER/IR  2 x 10 each , 1#    Table ER stretch         Therapeutic activities to increase functional performance including: See flowsheet below    Therapeutic Activities Reps    T-band Rows  2 x 10 red TB    T-band Extension  2 x 10 red TB    I,Y,Ts  2 x 10  each    Standing PNF Patterns  2 x 10 each (min A D2)      Patient Education and Home Exercises     Home Exercises Provided and Patient Education " Provided     Education provided:   - Home exercise program, Plan of Care, Delayed onset muscle soreness     Written Home Exercises Provided: Patient instructed to cont prior HEP. Exercises were reviewed and LORRAINE was able to demonstrate them prior to the end of the session.  LORRAINE demonstrated good  understanding of the education provided. See EMR under Patient Instructions for exercises provided during therapy sessions    ASSESSMENT   Lorraine is a 68 y.o. female referred to outpatient Physical Therapy with a medical diagnosis of displaced L humerus fracture. Patient presents with L shoulder pain, decreased L shoulder ROM, and decreased L UE muscle strength and motor control. Patient's impairments are currently limiting her functional use of L UE to complete ADLs. LPTA prompted pt with visual, verbal, and tactile cueing throughout tx for improved scapular stability, decreased compensations, and decreased speed to improve form. Pt displays improved strength and range of motion with exercises.   No adverse effects noted.         Patient prognosis is Good.   Patient will benefit from skilled outpatient Physical Therapy to address the deficits stated above and in the chart below, provide patient /family education, and to maximize patientt's level of independence.      Plan of care discussed with patient: Yes  Patient's spiritual, cultural and educational needs considered and patient is agreeable to the plan of care and goals as stated below:      Anticipated Barriers for therapy: compliance with Home exercise program, guarding  Goals:  Short Term Goals: 2 weeks   Patient will independently complete Home exercise program with correct form.   Patient will report decreased L shoulder pain at rest to less than or equal to 2/10 for improved quality of life.      Long Term Goals: 4 weeks   Pt will increase L shoulder flexion to 120 degrees, external rotation to C4, and internal rotation to L2 for independent dressing  Pt will  increase L upper extremity to 4/5 to allow patient to perform activities of daily living independently  Pt will report decrease in L shoulder pain to 3/10 at worst to improve quality of life  Patient will have increased FOTO score to greater than or equal to 55% indicating increased function.    PLAN   Plan of care Certification: 12/28/2023 to 3/22/2024.      Outpatient Physical Therapy 2 times weekly for 4 weeks to include the following interventions: Electrical Stimulation unattended, Manual Therapy, Moist Heat/ Ice, Patient Education, Therapeutic Activities, Therapeutic Exercise, and Ultrasound.     Plan to discharge per PT order   YOVANNY Sanchez   4/18/2024

## 2024-04-28 DIAGNOSIS — G47.09 OTHER INSOMNIA: ICD-10-CM

## 2024-04-29 RX ORDER — MIRTAZAPINE 45 MG/1
45 TABLET, FILM COATED ORAL NIGHTLY
Qty: 90 TABLET | Refills: 3 | Status: SHIPPED | OUTPATIENT
Start: 2024-04-29

## 2024-05-29 NOTE — PROGRESS NOTES
Subjective:         Patient ID: Lorraine Brambila is a 68 y.o. female.    Chief Complaint: Low-back Pain and Hip Pain      Pain  This is a chronic problem. The current episode started more than 1 year ago. The problem occurs daily. The problem has been waxing and waning. Associated symptoms include arthralgias and nausea. Pertinent negatives include no anorexia, change in bowel habit, chest pain, chills, coughing, diaphoresis, fever, neck pain, rash, sore throat, urinary symptoms, vertigo or vomiting.     Review of Systems   Constitutional:  Negative for activity change, appetite change, chills, diaphoresis, fever and unexpected weight change.   HENT:  Negative for drooling, ear discharge, ear pain, facial swelling, nosebleeds, sore throat, trouble swallowing, voice change and goiter.    Eyes:  Negative for photophobia, pain, discharge, redness and visual disturbance.   Respiratory:  Negative for apnea, cough, choking, chest tightness, shortness of breath, wheezing and stridor.    Cardiovascular:  Negative for chest pain, palpitations and leg swelling.   Gastrointestinal:  Positive for nausea. Negative for abdominal distention, anorexia, change in bowel habit, diarrhea, rectal pain, vomiting and fecal incontinence.   Endocrine: Negative for cold intolerance, heat intolerance, polydipsia, polyphagia and polyuria.   Genitourinary:  Negative for bladder incontinence, dysuria, flank pain, frequency and hot flashes.   Musculoskeletal:  Positive for arthralgias, back pain and leg pain. Negative for neck pain.   Integumentary:  Negative for color change, pallor and rash.   Allergic/Immunologic: Negative for immunocompromised state.   Neurological:  Negative for dizziness, vertigo, seizures, syncope, facial asymmetry, speech difficulty, light-headedness, memory loss and coordination difficulties.   Hematological:  Negative for adenopathy. Does not bruise/bleed easily.   Psychiatric/Behavioral:  Negative for agitation,  "behavioral problems, confusion, decreased concentration, dysphoric mood, hallucinations, self-injury and suicidal ideas. The patient is not nervous/anxious and is not hyperactive.            Past Medical History:   Diagnosis Date    COPD (chronic obstructive pulmonary disease)     Coronary artery disease     Fibromyalgia     Hyperlipidemia     Hypertension      Past Surgical History:   Procedure Laterality Date    CARDIAC CATHETERIZATION      HERNIA REPAIR      HYSTERECTOMY      OOPHORECTOMY       Social History     Socioeconomic History    Marital status:    Tobacco Use    Smoking status: Former     Current packs/day: 0.00     Average packs/day: 1 pack/day for 50.0 years (50.0 ttl pk-yrs)     Types: Cigarettes     Start date: 1969     Quit date: 2019     Years since quittin.1    Smokeless tobacco: Never   Substance and Sexual Activity    Alcohol use: Never    Drug use: Never    Sexual activity: Not Currently     Social Determinants of Health     Physical Activity: Sufficiently Active (2023)    Exercise Vital Sign     Days of Exercise per Week: 7 days     Minutes of Exercise per Session: 60 min     Family History   Problem Relation Name Age of Onset    Heart attack Father      Heart disease Father       Review of patient's allergies indicates:  No Known Allergies     Objective:  Vitals:    24 1333   BP: 128/69   Pulse: 68   Resp: 20   Weight: 39 kg (86 lb)   Height: 5' 6" (1.676 m)   PainSc:   7           Physical Exam  Vitals and nursing note reviewed. Exam conducted with a chaperone present.   Constitutional:       General: She is awake. She is not in acute distress.     Appearance: Normal appearance. She is not ill-appearing, toxic-appearing or diaphoretic.   HENT:      Head: Normocephalic and atraumatic.      Nose: Nose normal.      Mouth/Throat:      Mouth: Mucous membranes are moist.      Pharynx: Oropharynx is clear.   Eyes:      Conjunctiva/sclera: Conjunctivae normal.      " Pupils: Pupils are equal, round, and reactive to light.   Cardiovascular:      Rate and Rhythm: Normal rate.   Pulmonary:      Effort: Pulmonary effort is normal. No respiratory distress.   Abdominal:      Palpations: Abdomen is soft.      Tenderness: There is no guarding.   Musculoskeletal:         General: Normal range of motion.      Cervical back: Normal range of motion and neck supple. No rigidity.   Skin:     General: Skin is warm and dry.      Coloration: Skin is not jaundiced or pale.   Neurological:      General: No focal deficit present.      Mental Status: She is alert and oriented to person, place, and time. Mental status is at baseline.      Cranial Nerves: No cranial nerve deficit (II-XII).   Psychiatric:         Mood and Affect: Mood normal.         Behavior: Behavior normal. Behavior is cooperative.         Thought Content: Thought content normal.           US Abdominal Aorta  Narrative: EXAMINATION:  US ABDOMINAL AORTA    CLINICAL HISTORY:  Abdominal aortic aneurysm, without rupture, unspecified    TECHNIQUE:  Limited ultrasound was performed of the abdominal aorta, with cross sectional diameter measurements obtained.    COMPARISON:  None    FINDINGS:  The proximal abdominal aorta measures 1.9 x 1.8 cm.    The mid abdominal aorta measures 2.1 x 2.4 cm.    The distal abdominal aorta measures  3.0 x 3.0 cm.    The right iliac artery measures not well seen overlying bowel gas cm.    The left iliac artery measures 0.7 x 0.6 cm.    Aortoiliac atherosclerosis: Present  Impression: Abdominal aortic aneurysm maximum diameter 3 cm no significant change from previous study    TECHNOLOGIST: Micki Heath (RT) (US) RVT    Electronically signed by: Marga Patterson  Date:    04/05/2024  Time:    10:23       Office Visit on 01/10/2024   Component Date Value Ref Range Status    POC Amphetamines 01/10/2024 Negative  Negative, Inconclusive Final    POC Barbiturates 01/10/2024 Negative  Negative, Inconclusive Final    POC  Benzodiazepines 01/10/2024 Negative  Negative, Inconclusive Final    POC Cocaine 01/10/2024 Negative  Negative, Inconclusive Final    POC THC 01/10/2024 Negative  Negative, Inconclusive Final    POC Methadone 01/10/2024 Negative  Negative, Inconclusive Final    POC Methamphetamine 01/10/2024 Negative  Negative, Inconclusive Final    POC Opiates 01/10/2024 Negative  Negative, Inconclusive Final    POC Oxycodone 01/10/2024 Negative  Negative, Inconclusive Final    POC Phencyclidine 01/10/2024 Negative  Negative, Inconclusive Final    POC Methylenedioxymethamphetamine * 01/10/2024 Negative  Negative, Inconclusive Final    POC Tricyclic Antidepressants 01/10/2024 Negative  Negative, Inconclusive Final    POC Buprenorphine 01/10/2024 Negative   Final     Acceptable 01/10/2024 Yes   Final    POC Temperature (Urine) 01/10/2024 92   Final         Orders Placed This Encounter   Procedures    POCT Urine Drug Screen Presump     Interpretive Information:     Negative:  No drug detected at the cut off level.   Positive:  This result represents presumptive positive for the   tested drug, other substances may yield a positive response other   than the analyte of interest. This result should be utilized for   diagnostic purpose only. Confirmation testing will be performed upon physician request only.          Requested Prescriptions     Signed Prescriptions Disp Refills    gabapentin (NEURONTIN) 100 MG capsule 90 capsule 2     Sig: Take 1 capsule (100 mg total) by mouth 3 (three) times daily.    traMADoL (ULTRAM) 50 mg tablet 120 tablet 2     Sig: Take 1 tablet (50 mg total) by mouth every 6 (six) hours as needed for Pain.       Assessment:     1. Lumbosacral spondylosis without myelopathy    2. Neuropathy    3. Left arm pain    4. Encounter for long-term (current) use of other medications           A's of Opioid Risk Assessment  Activity:Patient can perform ADL.   Analgesia:Patients pain is partially controlled by  current medication. Patient has tried OTC medications such as Tylenol and Ibuprofen with out relief.   Adverse Effects: Patient denies constipation or sedation.  Aberrant Behavior:  reviewed with no aberrant drug seeking/taking behavior.  Overdose reversal drug naloxone discussed    Drug screen reviewed      Physical therapy [facility] December 28, 2023 through February 27, 2024    Bone density study March 14, 2023 osteoporosis        Plan:    September 19, 2023 vitamin-D level 19    Vitamin-D replacement 50,000 units 1 capsule p.o. q.week times 8 week      [facility]    Presumptive drug screen negative, this is expected result, patient takes tramadol, cup does not test for tramadol       Patient has discontinued benzodiazepine       She states current medication is helping with her discomfort    No new complaints today    She would like to continue with current treatment plan    Continue home exercise program as directed    Continue current medication as directed     Follow-up 3 months    Dr. Osorio January 2025    Bring original prescription medication bottles/container/box with labels to each visit

## 2024-06-05 ENCOUNTER — OFFICE VISIT (OUTPATIENT)
Dept: PAIN MEDICINE | Facility: CLINIC | Age: 69
End: 2024-06-05
Payer: MEDICARE

## 2024-06-05 VITALS
HEIGHT: 66 IN | RESPIRATION RATE: 20 BRPM | DIASTOLIC BLOOD PRESSURE: 69 MMHG | WEIGHT: 86 LBS | SYSTOLIC BLOOD PRESSURE: 128 MMHG | HEART RATE: 68 BPM | BODY MASS INDEX: 13.82 KG/M2

## 2024-06-05 DIAGNOSIS — M79.602 LEFT ARM PAIN: Chronic | ICD-10-CM

## 2024-06-05 DIAGNOSIS — G62.9 NEUROPATHY: Chronic | ICD-10-CM

## 2024-06-05 DIAGNOSIS — M47.817 LUMBOSACRAL SPONDYLOSIS WITHOUT MYELOPATHY: Primary | Chronic | ICD-10-CM

## 2024-06-05 DIAGNOSIS — Z79.899 ENCOUNTER FOR LONG-TERM (CURRENT) USE OF OTHER MEDICATIONS: ICD-10-CM

## 2024-06-05 PROCEDURE — 80305 DRUG TEST PRSMV DIR OPT OBS: CPT | Mod: PBBFAC | Performed by: PHYSICIAN ASSISTANT

## 2024-06-05 PROCEDURE — 99999PBSHW POCT URINE DRUG SCREEN PRESUMP: Mod: PBBFAC,,,

## 2024-06-05 PROCEDURE — 99214 OFFICE O/P EST MOD 30 MIN: CPT | Mod: S$PBB,,, | Performed by: PHYSICIAN ASSISTANT

## 2024-06-05 PROCEDURE — 99214 OFFICE O/P EST MOD 30 MIN: CPT | Mod: PBBFAC | Performed by: PHYSICIAN ASSISTANT

## 2024-06-05 RX ORDER — TRAMADOL HYDROCHLORIDE 50 MG/1
50 TABLET ORAL EVERY 6 HOURS PRN
Qty: 120 TABLET | Refills: 2 | Status: SHIPPED | OUTPATIENT
Start: 2024-06-05

## 2024-06-05 RX ORDER — GABAPENTIN 100 MG/1
100 CAPSULE ORAL 3 TIMES DAILY
Qty: 90 CAPSULE | Refills: 2 | Status: SHIPPED | OUTPATIENT
Start: 2024-06-05

## 2024-06-09 DIAGNOSIS — Z71.89 COMPLEX CARE COORDINATION: ICD-10-CM

## 2024-06-18 RX ORDER — CLOPIDOGREL BISULFATE 75 MG/1
75 TABLET ORAL
Qty: 90 TABLET | Refills: 3 | OUTPATIENT
Start: 2024-06-18

## 2024-06-19 DIAGNOSIS — E78.00 HYPERCHOLESTEROLEMIA: ICD-10-CM

## 2024-06-20 RX ORDER — ATORVASTATIN CALCIUM 40 MG/1
40 TABLET, FILM COATED ORAL NIGHTLY
Qty: 90 TABLET | Refills: 3 | Status: SHIPPED | OUTPATIENT
Start: 2024-06-20

## 2024-07-25 DIAGNOSIS — I99.8 BLOOD PRESSURE INSTABILITY: ICD-10-CM

## 2024-07-25 DIAGNOSIS — R00.2 PALPITATIONS: ICD-10-CM

## 2024-07-25 DIAGNOSIS — I25.10 CORONARY ARTERY DISEASE, UNSPECIFIED VESSEL OR LESION TYPE, UNSPECIFIED WHETHER ANGINA PRESENT, UNSPECIFIED WHETHER NATIVE OR TRANSPLANTED HEART: ICD-10-CM

## 2024-07-26 RX ORDER — METOPROLOL SUCCINATE 50 MG/1
50 TABLET, EXTENDED RELEASE ORAL
Qty: 90 TABLET | Refills: 1 | Status: SHIPPED | OUTPATIENT
Start: 2024-07-26

## 2024-07-31 ENCOUNTER — OFFICE VISIT (OUTPATIENT)
Dept: CARDIOLOGY | Facility: CLINIC | Age: 69
End: 2024-07-31
Payer: MEDICARE

## 2024-07-31 VITALS
WEIGHT: 85.19 LBS | HEART RATE: 83 BPM | SYSTOLIC BLOOD PRESSURE: 98 MMHG | HEIGHT: 66 IN | BODY MASS INDEX: 13.69 KG/M2 | DIASTOLIC BLOOD PRESSURE: 64 MMHG | OXYGEN SATURATION: 99 %

## 2024-07-31 DIAGNOSIS — I25.10 CORONARY ARTERY DISEASE INVOLVING NATIVE CORONARY ARTERY OF NATIVE HEART WITHOUT ANGINA PECTORIS: Primary | ICD-10-CM

## 2024-07-31 DIAGNOSIS — E78.5 HYPERLIPIDEMIA, UNSPECIFIED HYPERLIPIDEMIA TYPE: ICD-10-CM

## 2024-07-31 DIAGNOSIS — I10 HYPERTENSION, UNSPECIFIED TYPE: ICD-10-CM

## 2024-07-31 PROCEDURE — 99999 PR PBB SHADOW E&M-EST. PATIENT-LVL IV: CPT | Mod: PBBFAC,,, | Performed by: NURSE PRACTITIONER

## 2024-07-31 PROCEDURE — 93010 ELECTROCARDIOGRAM REPORT: CPT | Mod: S$PBB,,, | Performed by: INTERNAL MEDICINE

## 2024-07-31 PROCEDURE — 93005 ELECTROCARDIOGRAM TRACING: CPT | Mod: PBBFAC | Performed by: INTERNAL MEDICINE

## 2024-07-31 PROCEDURE — 99214 OFFICE O/P EST MOD 30 MIN: CPT | Mod: PBBFAC,25 | Performed by: NURSE PRACTITIONER

## 2024-07-31 PROCEDURE — 99214 OFFICE O/P EST MOD 30 MIN: CPT | Mod: S$PBB,,, | Performed by: NURSE PRACTITIONER

## 2024-07-31 NOTE — PROGRESS NOTES
PCP: Danya Orellana MD    Referring Provider:       Subjective:   Lorraine Brambila is a 69 y.o. female with hx of NSTEMI in 2019 with DEX prox LCx and residual disease of the RCA which did not appear to be r/t ischemia by Lexiscan 5/2019, who presents for routine follow up. She states that she is doing well and denies complaints.        12/12/2023 presents for follow up requested by her PCP for BP issues. Patient's bp since 12/2/2023 has been well controlled.   12/2/2023  123/68 and 125/83  12/5/2023   137/69  12/12/2023  128/82  The patient's  states it has been as low was 80s systolic and as high as 200 systolic on home bp readings.   She ws admitted 12/2/2023 for confusion and headache with no source found. She states that she has chronic pain and trouble sleeping. It is uncertain why she is having these issues. I see no cardiac source. I suspect that her lows are caused by dehydration and her highs by pain/anxiety. I reviewed her meds and I suspect her issue with confusion was an interaction between her Remeron and the benadryl in the Tylenol PM she was taking for sleep but again this can not be proven.    I believe given her recent bp she can tolerate an increase in her metoprolol from 25 mg po daily to 50 mg po daily. She should stay hydrated and PCP should treat any underlying issues with pain/anxiety. She would be a good candidate for the digital medicine program but unfortunately I don't think she is a candidate due to insurance.     1/30/2023 presents for routine follow up. She denies issues other than occasional, transient palpitations that has been ongoing for years.         Fhx:  Family History   Problem Relation Name Age of Onset    Heart attack Father      Heart disease Father        Shx:   Social History     Socioeconomic History    Marital status:    Tobacco Use    Smoking status: Former     Current packs/day: 0.00     Average packs/day: 1 pack/day for 50.0 years (50.0 ttl  pk-yrs)     Types: Cigarettes     Start date: 1969     Quit date: 2019     Years since quittin.3    Smokeless tobacco: Never   Substance and Sexual Activity    Alcohol use: Never    Drug use: Never    Sexual activity: Not Currently     Social Determinants of Health     Financial Resource Strain: Low Risk  (2024)    Overall Financial Resource Strain (CARDIA)     Difficulty of Paying Living Expenses: Not hard at all   Food Insecurity: No Food Insecurity (2024)    Hunger Vital Sign     Worried About Running Out of Food in the Last Year: Never true     Ran Out of Food in the Last Year: Never true   Physical Activity: Inactive (2024)    Exercise Vital Sign     Days of Exercise per Week: 0 days     Minutes of Exercise per Session: 30 min   Stress: No Stress Concern Present (2024)    [employer] - Occupational Stress Questionnaire     Feeling of Stress : Not at all   Housing Stability: Unknown (2024)    Housing Stability Vital Sign     Unable to Pay for Housing in the Last Year: No        EKG   2024 RSR with HR 82 bpm; normal EKG; when compaed with EKG 2023 no significant change was found.  2023 RSR with sinus arrhythmia; HR 86 bpm; normal EKG  2023 RSR with HR 63 bpm; normal EKG  2022 RSR with HR 72 bpm; normal EKG    Lexiscan 2019  No definite findings of significant reversible ischemia can be detected by the study  Mild and fixed inferior wall defect wth no associated WMA suggestive of possible attenuation artifact appeared to be more likely than ischemia  Normal LV size and systolic function  LVEF 88%    ECHO 2019  Normal chamber size with possible normal LVSF with est LVEF 50%; poor quality no WMA could be assessed  Mild TR with est RVSP 36 mmHg  Pseudo-normalization of mitral inflow suggestive of LVDD  Increased LV filling pressure    [facility] 2019- Dr. Santiago DELACRUZ-short  LAD  with luminal irregularities; mLAD has 30%  calcified plaque  LCx- mid LCx had 780% lesion with unstable plaque appearance   RCA is non-dominant and has a 95% prox lesion with remaining vessel diffusely diseased.  S/e KARYN mid Lcx with SURENDRA?I 3 flow post intervention      Lab Results   Component Value Date     12/02/2023    K 3.7 12/02/2023     12/02/2023    CO2 33 (H) 12/02/2023    BUN 14 12/02/2023    CREATININE 0.74 12/02/2023    CALCIUM 10.8 (H) 12/02/2023    ANIONGAP 11 12/02/2023       Lab Results   Component Value Date    CHOL 174 12/02/2023    CHOL 179 09/21/2023    CHOL 214 (H) 03/14/2023     Lab Results   Component Value Date    HDL 81 (H) 12/02/2023    HDL 87 (H) 09/21/2023    HDL 89 (H) 03/14/2023     Lab Results   Component Value Date    LDLCALC 82 12/02/2023    LDLCALC 76 09/21/2023    LDLCALC 105 03/14/2023     Lab Results   Component Value Date    TRIG 57 12/02/2023    TRIG 80 09/21/2023    TRIG 98 03/14/2023     Lab Results   Component Value Date    CHOLHDL 2.1 12/02/2023    CHOLHDL 2.1 09/21/2023    CHOLHDL 2.4 03/14/2023       Lab Results   Component Value Date    WBC 7.07 12/02/2023    HGB 12.6 12/02/2023    HCT 38.1 12/02/2023    .8 (H) 12/02/2023     12/02/2023           Current Outpatient Medications:     atorvastatin (LIPITOR) 40 MG tablet, TAKE 1 TABLET EVERY EVENING, Disp: 90 tablet, Rfl: 3    clopidogreL (PLAVIX) 75 mg tablet, Take 1 tablet (75 mg total) by mouth once daily., Disp: 30 tablet, Rfl: 0    gabapentin (NEURONTIN) 100 MG capsule, Take 1 capsule (100 mg total) by mouth 3 (three) times daily., Disp: 90 capsule, Rfl: 2    metoprolol succinate (TOPROL-XL) 50 MG 24 hr tablet, TAKE 1 TABLET ONCE DAILY, Disp: 90 tablet, Rfl: 1    mirtazapine (REMERON SOL-TAB) 45 MG disintegrating tablet, Take 1 tablet (45 mg total) by mouth nightly., Disp: 90 tablet, Rfl: 0    mirtazapine (REMERON) 45 MG tablet, TAKE 1 TABLET EVERY EVENING, Disp: 90 tablet, Rfl: 3    nitroGLYCERIN (NITROSTAT) 0.4 MG SL tablet, Place 1  "tablet (0.4 mg total) under the tongue every 5 (five) minutes as needed for Chest pain., Disp: 90 tablet, Rfl: 3    ondansetron (ZOFRAN) 4 MG tablet, Take 4 mg by mouth every 8 (eight) hours as needed for Nausea., Disp: , Rfl:     pantoprazole (PROTONIX) 40 MG tablet, , Disp: , Rfl:     traMADoL (ULTRAM) 50 mg tablet, Take 1 tablet (50 mg total) by mouth every 6 (six) hours as needed for Pain., Disp: 120 tablet, Rfl: 2    HYDROcodone-acetaminophen (NORCO) 5-325 mg per tablet, Take 1 tablet by mouth every 6 (six) hours as needed for Pain. (Patient not taking: Reported on 1/10/2024), Disp: 28 tablet, Rfl: 0  Meds reviewed but not reconciled. She did not bring her meds.     Review of Systems   Respiratory:  Negative for shortness of breath.    Cardiovascular:  Negative for chest pain, palpitations, orthopnea, claudication, leg swelling and PND.   Neurological:  Negative for dizziness, loss of consciousness and weakness.         Objective:   BP 98/64 (BP Location: Left arm, Patient Position: Sitting)   Pulse 83   Ht 5' 6" (1.676 m)   Wt 38.6 kg (85 lb 3.2 oz)   SpO2 99%   BMI 13.75 kg/m²     Physical Exam  Vitals and nursing note reviewed.   Constitutional:       Appearance: Normal appearance. She is normal weight.   HENT:      Head: Normocephalic and atraumatic.   Neck:      Vascular: No carotid bruit.   Cardiovascular:      Rate and Rhythm: Normal rate and regular rhythm.      Pulses: Normal pulses.      Heart sounds: Normal heart sounds.   Pulmonary:      Effort: Pulmonary effort is normal.      Breath sounds: Normal breath sounds.   Abdominal:      Palpations: Abdomen is soft.   Musculoskeletal:      Cervical back: Neck supple.      Right lower leg: No edema.      Left lower leg: No edema.   Skin:     General: Skin is warm and dry.      Capillary Refill: Capillary refill takes less than 2 seconds.   Neurological:      Mental Status: She is alert.           Assessment:     1. Coronary artery disease involving " native coronary artery of native heart without angina pectoris  EKG 12-lead      2. Hyperlipidemia, unspecified hyperlipidemia type        3. Hypertension, unspecified type              Plan:   Coronary artery disease  Asymptomatic  Continue Plavix/Lipitor    Hyperlipidemia  Lab Results   Component Value Date    CHOL 174 12/02/2023    CHOL 179 09/21/2023    CHOL 214 (H) 03/14/2023     Lab Results   Component Value Date    HDL 81 (H) 12/02/2023    HDL 87 (H) 09/21/2023    HDL 89 (H) 03/14/2023     Lab Results   Component Value Date    LDLCALC 82 12/02/2023    LDLCALC 76 09/21/2023    LDLCALC 105 03/14/2023     Lab Results   Component Value Date    TRIG 57 12/02/2023    TRIG 80 09/21/2023    TRIG 98 03/14/2023       Lab Results   Component Value Date    CHOLHDL 2.1 12/02/2023    CHOLHDL 2.1 09/21/2023    CHOLHDL 2.4 03/14/2023     Lipitor 40 mg po daily  Lipids followed by PCP    Hypertension  98/64 mmHg    RTC: 1 year

## 2024-07-31 NOTE — ASSESSMENT & PLAN NOTE
Lab Results   Component Value Date    CHOL 174 12/02/2023    CHOL 179 09/21/2023    CHOL 214 (H) 03/14/2023     Lab Results   Component Value Date    HDL 81 (H) 12/02/2023    HDL 87 (H) 09/21/2023    HDL 89 (H) 03/14/2023     Lab Results   Component Value Date    LDLCALC 82 12/02/2023    LDLCALC 76 09/21/2023    LDLCALC 105 03/14/2023     Lab Results   Component Value Date    TRIG 57 12/02/2023    TRIG 80 09/21/2023    TRIG 98 03/14/2023       Lab Results   Component Value Date    CHOLHDL 2.1 12/02/2023    CHOLHDL 2.1 09/21/2023    CHOLHDL 2.4 03/14/2023     Lipitor 40 mg po daily  Lipids followed by PCP

## 2024-08-01 LAB
OHS QRS DURATION: 86 MS
OHS QTC CALCULATION: 418 MS

## 2024-08-06 ENCOUNTER — APPOINTMENT (OUTPATIENT)
Dept: RADIOLOGY | Facility: CLINIC | Age: 69
End: 2024-08-06
Attending: NURSE PRACTITIONER
Payer: MEDICARE

## 2024-08-06 ENCOUNTER — OFFICE VISIT (OUTPATIENT)
Dept: PRIMARY CARE CLINIC | Facility: CLINIC | Age: 69
End: 2024-08-06
Payer: MEDICARE

## 2024-08-06 VITALS
WEIGHT: 83.5 LBS | TEMPERATURE: 98 F | OXYGEN SATURATION: 98 % | BODY MASS INDEX: 13.42 KG/M2 | RESPIRATION RATE: 18 BRPM | DIASTOLIC BLOOD PRESSURE: 80 MMHG | HEART RATE: 78 BPM | HEIGHT: 66 IN | SYSTOLIC BLOOD PRESSURE: 140 MMHG

## 2024-08-06 DIAGNOSIS — E78.49 OTHER HYPERLIPIDEMIA: ICD-10-CM

## 2024-08-06 DIAGNOSIS — M25.512 ACUTE PAIN OF LEFT SHOULDER: ICD-10-CM

## 2024-08-06 DIAGNOSIS — M54.2 NECK PAIN: ICD-10-CM

## 2024-08-06 DIAGNOSIS — M54.2 NECK PAIN: Primary | ICD-10-CM

## 2024-08-06 DIAGNOSIS — I25.10 CORONARY ARTERY DISEASE INVOLVING NATIVE CORONARY ARTERY OF NATIVE HEART WITHOUT ANGINA PECTORIS: ICD-10-CM

## 2024-08-06 DIAGNOSIS — I10 PRIMARY HYPERTENSION: ICD-10-CM

## 2024-08-06 PROBLEM — M79.10 MYALGIA: Chronic | Status: RESOLVED | Noted: 2023-09-18 | Resolved: 2024-08-06

## 2024-08-06 PROBLEM — R41.0 CONFUSION: Status: RESOLVED | Noted: 2023-12-02 | Resolved: 2024-08-06

## 2024-08-06 PROBLEM — E55.9 VITAMIN D DEFICIENCY: Chronic | Status: RESOLVED | Noted: 2023-09-18 | Resolved: 2024-08-06

## 2024-08-06 PROBLEM — R29.818 ACUTE FOCAL NEUROLOGICAL DEFICIT: Status: RESOLVED | Noted: 2023-12-02 | Resolved: 2024-08-06

## 2024-08-06 PROBLEM — S42.212D CLOSED DISPLACED FRACTURE OF SURGICAL NECK OF LEFT HUMERUS WITH ROUTINE HEALING: Status: RESOLVED | Noted: 2023-11-13 | Resolved: 2024-08-06

## 2024-08-06 PROBLEM — M79.602 LEFT ARM PAIN: Chronic | Status: RESOLVED | Noted: 2024-02-29 | Resolved: 2024-08-06

## 2024-08-06 PROBLEM — T80.818A: Status: RESOLVED | Noted: 2023-12-02 | Resolved: 2024-08-06

## 2024-08-06 PROBLEM — R51.9 ACUTE NONINTRACTABLE HEADACHE: Status: RESOLVED | Noted: 2023-12-02 | Resolved: 2024-08-06

## 2024-08-06 PROBLEM — S42.92XD FRACTURE OF LEFT SHOULDER WITH ROUTINE HEALING: Status: RESOLVED | Noted: 2024-01-29 | Resolved: 2024-08-06

## 2024-08-06 LAB
ALBUMIN SERPL BCP-MCNC: 4.4 G/DL (ref 3.5–5)
ALBUMIN/GLOB SERPL: 1.2 {RATIO}
ALP SERPL-CCNC: 140 U/L (ref 55–142)
ALT SERPL W P-5'-P-CCNC: 21 U/L (ref 13–56)
ANION GAP SERPL CALCULATED.3IONS-SCNC: 17 MMOL/L (ref 7–16)
AST SERPL W P-5'-P-CCNC: 24 U/L (ref 15–37)
BASOPHILS # BLD AUTO: 0.05 K/UL (ref 0–0.2)
BASOPHILS NFR BLD AUTO: 0.9 % (ref 0–1)
BILIRUB SERPL-MCNC: 0.3 MG/DL (ref ?–1.2)
BUN SERPL-MCNC: 14 MG/DL (ref 7–18)
BUN/CREAT SERPL: 17 (ref 6–20)
CALCIUM SERPL-MCNC: 11 MG/DL (ref 8.5–10.1)
CHLORIDE SERPL-SCNC: 98 MMOL/L (ref 98–107)
CHOLEST SERPL-MCNC: 180 MG/DL (ref 0–200)
CHOLEST/HDLC SERPL: 2.3 {RATIO}
CO2 SERPL-SCNC: 26 MMOL/L (ref 21–32)
CREAT SERPL-MCNC: 0.82 MG/DL (ref 0.55–1.02)
DIFFERENTIAL METHOD BLD: ABNORMAL
EGFR (NO RACE VARIABLE) (RUSH/TITUS): 77 ML/MIN/1.73M2
EOSINOPHIL # BLD AUTO: 0.19 K/UL (ref 0–0.5)
EOSINOPHIL NFR BLD AUTO: 3.6 % (ref 1–4)
ERYTHROCYTE [DISTWIDTH] IN BLOOD BY AUTOMATED COUNT: 12.3 % (ref 11.5–14.5)
GLOBULIN SER-MCNC: 3.7 G/DL (ref 2–4)
GLUCOSE SERPL-MCNC: 93 MG/DL (ref 74–106)
HCT VFR BLD AUTO: 41.3 % (ref 38–47)
HDLC SERPL-MCNC: 77 MG/DL (ref 40–60)
HGB BLD-MCNC: 13.5 G/DL (ref 12–16)
IMM GRANULOCYTES # BLD AUTO: 0.02 K/UL (ref 0–0.04)
IMM GRANULOCYTES NFR BLD: 0.4 % (ref 0–0.4)
LDLC SERPL CALC-MCNC: 82 MG/DL
LDLC/HDLC SERPL: 1.1 {RATIO}
LYMPHOCYTES # BLD AUTO: 1.5 K/UL (ref 1–4.8)
LYMPHOCYTES NFR BLD AUTO: 28.1 % (ref 27–41)
MCH RBC QN AUTO: 30.8 PG (ref 27–31)
MCHC RBC AUTO-ENTMCNC: 32.7 G/DL (ref 32–36)
MCV RBC AUTO: 94.1 FL (ref 80–96)
MONOCYTES # BLD AUTO: 0.46 K/UL (ref 0–0.8)
MONOCYTES NFR BLD AUTO: 8.6 % (ref 2–6)
MPC BLD CALC-MCNC: 9.5 FL (ref 9.4–12.4)
NEUTROPHILS # BLD AUTO: 3.12 K/UL (ref 1.8–7.7)
NEUTROPHILS NFR BLD AUTO: 58.4 % (ref 53–65)
NONHDLC SERPL-MCNC: 103 MG/DL
NRBC # BLD AUTO: 0 X10E3/UL
NRBC, AUTO (.00): 0 %
PLATELET # BLD AUTO: 258 K/UL (ref 150–400)
POTASSIUM SERPL-SCNC: 4.6 MMOL/L (ref 3.5–5.1)
PROT SERPL-MCNC: 8.1 G/DL (ref 6.4–8.2)
RBC # BLD AUTO: 4.39 M/UL (ref 4.2–5.4)
SODIUM SERPL-SCNC: 136 MMOL/L (ref 136–145)
TRIGL SERPL-MCNC: 106 MG/DL (ref 35–150)
VLDLC SERPL-MCNC: 21 MG/DL
WBC # BLD AUTO: 5.34 K/UL (ref 4.5–11)

## 2024-08-06 PROCEDURE — 72040 X-RAY EXAM NECK SPINE 2-3 VW: CPT | Mod: 26,,, | Performed by: RADIOLOGY

## 2024-08-06 PROCEDURE — 73030 X-RAY EXAM OF SHOULDER: CPT | Mod: TC,RHCUB,LT | Performed by: NURSE PRACTITIONER

## 2024-08-06 PROCEDURE — 72040 X-RAY EXAM NECK SPINE 2-3 VW: CPT | Mod: TC,RHCUB | Performed by: NURSE PRACTITIONER

## 2024-08-06 PROCEDURE — 80061 LIPID PANEL: CPT | Mod: ,,, | Performed by: CLINICAL MEDICAL LABORATORY

## 2024-08-06 PROCEDURE — 73030 X-RAY EXAM OF SHOULDER: CPT | Mod: 26,LT,, | Performed by: RADIOLOGY

## 2024-08-06 PROCEDURE — 96372 THER/PROPH/DIAG INJ SC/IM: CPT | Mod: ,,, | Performed by: NURSE PRACTITIONER

## 2024-08-06 PROCEDURE — 85025 COMPLETE CBC W/AUTO DIFF WBC: CPT | Mod: ,,, | Performed by: CLINICAL MEDICAL LABORATORY

## 2024-08-06 PROCEDURE — 80053 COMPREHEN METABOLIC PANEL: CPT | Mod: ,,, | Performed by: CLINICAL MEDICAL LABORATORY

## 2024-08-06 PROCEDURE — 99214 OFFICE O/P EST MOD 30 MIN: CPT | Mod: ,,, | Performed by: NURSE PRACTITIONER

## 2024-08-06 RX ORDER — ORPHENADRINE CITRATE 30 MG/ML
60 INJECTION INTRAMUSCULAR; INTRAVENOUS
Status: COMPLETED | OUTPATIENT
Start: 2024-08-06 | End: 2024-08-06

## 2024-08-06 RX ORDER — DEXAMETHASONE SODIUM PHOSPHATE 4 MG/ML
4 INJECTION, SOLUTION INTRA-ARTICULAR; INTRALESIONAL; INTRAMUSCULAR; INTRAVENOUS; SOFT TISSUE
Status: COMPLETED | OUTPATIENT
Start: 2024-08-06 | End: 2024-08-06

## 2024-08-06 RX ORDER — CLOPIDOGREL BISULFATE 75 MG/1
75 TABLET ORAL DAILY
Qty: 90 TABLET | Refills: 2 | Status: SHIPPED | OUTPATIENT
Start: 2024-08-06 | End: 2025-05-03

## 2024-08-06 RX ORDER — METHYLPREDNISOLONE ACETATE 80 MG/ML
80 INJECTION, SUSPENSION INTRA-ARTICULAR; INTRALESIONAL; INTRAMUSCULAR; SOFT TISSUE
Status: COMPLETED | OUTPATIENT
Start: 2024-08-06 | End: 2024-08-06

## 2024-08-06 RX ADMIN — METHYLPREDNISOLONE ACETATE 80 MG: 80 INJECTION, SUSPENSION INTRA-ARTICULAR; INTRALESIONAL; INTRAMUSCULAR; SOFT TISSUE at 11:08

## 2024-08-06 RX ADMIN — DEXAMETHASONE SODIUM PHOSPHATE 4 MG: 4 INJECTION, SOLUTION INTRA-ARTICULAR; INTRALESIONAL; INTRAMUSCULAR; INTRAVENOUS; SOFT TISSUE at 11:08

## 2024-08-06 RX ADMIN — ORPHENADRINE CITRATE 60 MG: 30 INJECTION INTRAMUSCULAR; INTRAVENOUS at 11:08

## 2024-08-07 ENCOUNTER — TELEPHONE (OUTPATIENT)
Dept: PRIMARY CARE CLINIC | Facility: CLINIC | Age: 69
End: 2024-08-07
Payer: MEDICARE

## 2024-08-08 ENCOUNTER — TELEPHONE (OUTPATIENT)
Dept: PRIMARY CARE CLINIC | Facility: CLINIC | Age: 69
End: 2024-08-08
Payer: MEDICARE

## 2024-08-12 ENCOUNTER — TELEPHONE (OUTPATIENT)
Dept: PRIMARY CARE CLINIC | Facility: CLINIC | Age: 69
End: 2024-08-12
Payer: MEDICARE

## 2024-08-12 NOTE — TELEPHONE ENCOUNTER
----- Message from [email or web address] sent at 8/12/2024  8:46 AM CDT -----  PT  called saying  had mention referring pt to get a scan done on her shoulder due to arthritis and he said she is ready to set that appt up and prefers to use [facility] if possible? Pt did leave a callback #[phone]

## 2024-08-14 ENCOUNTER — HOSPITAL ENCOUNTER (EMERGENCY)
Facility: HOSPITAL | Age: 69
Discharge: HOME | End: 2024-08-14
Attending: EMERGENCY MEDICINE
Payer: MEDICARE

## 2024-08-14 VITALS
BODY MASS INDEX: 12.96 KG/M2 | OXYGEN SATURATION: 98 % | RESPIRATION RATE: 20 BRPM | HEART RATE: 94 BPM | WEIGHT: 80.63 LBS | TEMPERATURE: 98 F | HEIGHT: 66 IN | SYSTOLIC BLOOD PRESSURE: 113 MMHG | DIASTOLIC BLOOD PRESSURE: 78 MMHG

## 2024-08-14 DIAGNOSIS — E43 SEVERE PROTEIN-CALORIE MALNUTRITION: ICD-10-CM

## 2024-08-14 DIAGNOSIS — M54.12 CERVICAL RADICULOPATHY: Primary | ICD-10-CM

## 2024-08-14 PROCEDURE — 96372 THER/PROPH/DIAG INJ SC/IM: CPT | Performed by: EMERGENCY MEDICINE

## 2024-08-14 PROCEDURE — 99284 EMERGENCY DEPT VISIT MOD MDM: CPT | Mod: 25

## 2024-08-14 PROCEDURE — 63600175 PHARM REV CODE 636 W HCPCS: Performed by: EMERGENCY MEDICINE

## 2024-08-14 RX ORDER — PROCHLORPERAZINE EDISYLATE 5 MG/ML
5 INJECTION INTRAMUSCULAR; INTRAVENOUS
Status: COMPLETED | OUTPATIENT
Start: 2024-08-14 | End: 2024-08-14

## 2024-08-14 RX ORDER — HYDROMORPHONE HYDROCHLORIDE 1 MG/ML
0.5 INJECTION, SOLUTION INTRAMUSCULAR; INTRAVENOUS; SUBCUTANEOUS
Status: COMPLETED | OUTPATIENT
Start: 2024-08-14 | End: 2024-08-14

## 2024-08-14 RX ADMIN — PROCHLORPERAZINE EDISYLATE 5 MG: 5 INJECTION INTRAMUSCULAR; INTRAVENOUS at 11:08

## 2024-08-14 RX ADMIN — HYDROMORPHONE HYDROCHLORIDE 0.5 MG: 1 INJECTION, SOLUTION INTRAMUSCULAR; INTRAVENOUS; SUBCUTANEOUS at 11:08

## 2024-08-14 NOTE — ED TRIAGE NOTES
"Pt presents to ED with c/o left shoulder pain x1 month, pt saw pcp and is on meds and waiting on an mri for pain, pt is wondering if a "nerve block" can be provided here in ER for pain, no injury reported  "

## 2024-08-14 NOTE — ED PROVIDER NOTES
"Encounter Date: 2024       History     Chief Complaint   Patient presents with    Shoulder Pain     Patient here for chronic pain radiating from base of her neck on the left into her left shoulder.  She is awaiting an MRI scan from her primary physician.  States she called her primary physician they told her come to the emergency department for her pain.  Patient is requesting a nerve block.  Patient sees a pain specialist.  Patient states there pain is so bad she has been unable to eat well and has lost 5 lb going from her baseline of 85 lb down to 80 lb.  She states she has always been "thin".      Review of patient's allergies indicates:  No Known Allergies  Past Medical History:   Diagnosis Date    COPD (chronic obstructive pulmonary disease)     Coronary artery disease     Fibromyalgia     History of non-ST elevation myocardial infarction (NSTEMI)     2019      Hyperlipidemia     Hypertension      Past Surgical History:   Procedure Laterality Date    CARDIAC CATHETERIZATION      HERNIA REPAIR      HYSTERECTOMY      OOPHORECTOMY       Family History   Problem Relation Name Age of Onset    Heart attack Father      Heart disease Father       Social History     Tobacco Use    Smoking status: Former     Current packs/day: 0.00     Average packs/day: 1 pack/day for 50.0 years (50.0 ttl pk-yrs)     Types: Cigarettes     Start date: 1969     Quit date: 2019     Years since quittin.3    Smokeless tobacco: Never   Substance Use Topics    Alcohol use: Never    Drug use: Never     Review of Systems   Constitutional: Negative.    HENT: Negative.     Eyes: Negative.    Respiratory: Negative.     Cardiovascular: Negative.    Gastrointestinal: Negative.    Genitourinary: Negative.    Musculoskeletal:  Positive for neck pain. Negative for arthralgias, back pain, gait problem, joint swelling and neck stiffness.   Skin: Negative.    Neurological:  Positive for numbness (Reports numbness of her left thumb tip on a " chronic basis.  Describes radicular type pain from the left side of her neck into the left shoulder.). Negative for dizziness, tremors, seizures, syncope, facial asymmetry, speech difficulty, weakness, light-headedness and headaches.   Psychiatric/Behavioral: Negative.     All other systems reviewed and are negative.      Physical Exam     Initial Vitals [08/14/24 1018]   BP Pulse Resp Temp SpO2   (!) 154/94 104 19 97.8 °F (36.6 °C) 98 %      MAP       --         Physical Exam    Nursing note and vitals reviewed.  Constitutional: She appears cachectic. She is active and cooperative.   HENT:   Head: Atraumatic.   Eyes: Conjunctivae and EOM are normal. Pupils are equal, round, and reactive to light. No scleral icterus.   Neck: Neck supple. No JVD present.       Patient has tenderness and spasm of the left cervical paraspinal muscles as well as left trapezius muscle.   Normal range of motion.  Cardiovascular:  Normal rate, regular rhythm, normal heart sounds and intact distal pulses.           No murmur heard.  Pulmonary/Chest: Breath sounds normal. No stridor. No respiratory distress. She has no wheezes. She has no rhonchi. She has no rales.   Abdominal: Abdomen is scaphoid and soft. Bowel sounds are normal. She exhibits no distension. There is no abdominal tenderness.   Musculoskeletal:         General: No edema. Normal range of motion.      Cervical back: Normal range of motion and neck supple.     Lymphadenopathy:     She has no cervical adenopathy.   Neurological: She is alert and oriented to person, place, and time. She has normal strength. No cranial nerve deficit. GCS score is 15. GCS eye subscore is 4. GCS verbal subscore is 5. GCS motor subscore is 6.   Skin: Skin is warm and dry. Capillary refill takes less than 2 seconds. No rash noted. No erythema. No pallor.   Psychiatric: She has a normal mood and affect. Her behavior is normal.         Medical Screening Exam   See Full Note    ED Course    Procedures  Labs Reviewed - No data to display       Imaging Results    None          Medications   HYDROmorphone injection 0.5 mg (0.5 mg Intramuscular Given 8/14/24 1112)   prochlorperazine injection Soln 5 mg (5 mg Intramuscular Given 8/14/24 1111)     Medical Decision Making  Differential diagnosis includes muscle spasm, cervical radiculopathy, arthritis of left shoulder.    Clinically patient has a left cervical radiculopathy.  This is chronic.  Recommend continued outpatient management.  Recommend follow up with her pain specialist and also to follow up for cervical MRI as scheduled primary physician.  Patient was given IM Dilaudid and prochlorperazine today for acute pain relief.  Recommended Decadron injection which patient declined.  Also advised patient that we do not do nerve blocks here in the emergency department.  Discharge and follow up instructions given.  Also discussed with patient increased calorie diet and follow up outpatient with primary physician regarding nutritional status.    Risk  Prescription drug management.                                      Clinical Impression:   Final diagnoses:  [M54.12] Cervical radiculopathy (Primary)  [E43] Severe protein-calorie malnutrition        ED Disposition Condition    Discharge Stable          ED Prescriptions    None       Follow-up Information       Follow up With Specialties Details Why Contact Info    Danya Orellana MD Family Medicine Schedule an appointment as soon as possible for a visit in 1 day To recheck, to discuss getting MRI scan of cervical spine, and for treatment of chronic malnutrition and weight loss, failure to thrive. [address] [place] [address]  [phone]               Andrei Angulo,   08/14/24 1139

## 2024-08-16 DIAGNOSIS — M54.2 CERVICALGIA: Primary | ICD-10-CM

## 2024-08-16 DIAGNOSIS — M25.512 ACUTE PAIN OF LEFT SHOULDER: ICD-10-CM

## 2024-08-16 NOTE — TELEPHONE ENCOUNTER
----- Message from Alexsander Johnson DNP, FNP-C sent at 8/16/2024 11:53 AM CDT -----  For Lorraine Brambila. Please send xanax 0.5 mg po x 1 dose, 30 minutes before MRI    Please send prescription to Dr. Orellana  ----- Message -----  From: Shannan Gray LPN  Sent: 8/16/2024  11:36 AM CDT  To: Alexsander Johnson DNP, FNP-C      ----- Message -----  From: Courtney Arroyo RT  Sent: 8/16/2024  11:23 AM CDT  To: Alex Beltre Staff    Patient is scheduled for MRI's on Tuesday, August 20, 2024 @ 1:00 & 01:45 @ [facility]. This is a closed MRI and the patient would like to know if she can get a RX for something to calm her claustrophobia prior to the appt.

## 2024-08-19 ENCOUNTER — TELEPHONE (OUTPATIENT)
Dept: PRIMARY CARE CLINIC | Facility: CLINIC | Age: 69
End: 2024-08-19
Payer: MEDICARE

## 2024-08-19 RX ORDER — ALPRAZOLAM 0.5 MG/1
TABLET ORAL
Qty: 1 TABLET | Refills: 0 | Status: SHIPPED | OUTPATIENT
Start: 2024-08-19

## 2024-08-19 NOTE — TELEPHONE ENCOUNTER
----- Message from Samira Daniels sent at 8/19/2024  2:04 PM CDT -----  Please call patient concerning anxiety med for MRI

## 2024-08-20 ENCOUNTER — HOSPITAL ENCOUNTER (OUTPATIENT)
Dept: RADIOLOGY | Facility: HOSPITAL | Age: 69
Discharge: HOME | End: 2024-08-20
Attending: NURSE PRACTITIONER
Payer: MEDICARE

## 2024-08-20 ENCOUNTER — TELEPHONE (OUTPATIENT)
Dept: PRIMARY CARE CLINIC | Facility: CLINIC | Age: 69
End: 2024-08-20
Payer: MEDICARE

## 2024-08-20 DIAGNOSIS — M25.512 ACUTE PAIN OF LEFT SHOULDER: ICD-10-CM

## 2024-08-20 DIAGNOSIS — R93.7 ABNORMAL MRI, CERVICAL SPINE: Primary | ICD-10-CM

## 2024-08-20 DIAGNOSIS — M54.2 CERVICALGIA: ICD-10-CM

## 2024-08-20 PROCEDURE — 73221 MRI JOINT UPR EXTREM W/O DYE: CPT | Mod: TC,LT

## 2024-08-20 PROCEDURE — 72141 MRI NECK SPINE W/O DYE: CPT | Mod: TC

## 2024-08-20 NOTE — TELEPHONE ENCOUNTER
----- Message from Alexsander Johnson DNP, GIUSEPPE-C sent at 8/20/2024  3:37 PM CDT -----  Please notify patient of results.     Lesion noted on exam; needs further evaluation.  Referral to Dr. Sparks (back and spine specialist) has been made.

## 2024-08-20 NOTE — TELEPHONE ENCOUNTER
----- Message from Alexsander Johnson DNP, GIUSEPPE-C sent at 8/20/2024  3:41 PM CDT -----  Please notify of results. Has osteoarthrosis of  shoulder.

## 2024-08-26 ENCOUNTER — TELEPHONE (OUTPATIENT)
Dept: PRIMARY CARE CLINIC | Facility: CLINIC | Age: 69
End: 2024-08-26
Payer: MEDICARE

## 2024-08-26 NOTE — TELEPHONE ENCOUNTER
----- Message from [employer] sent at 8/26/2024  9:26 AM CDT -----  PT  called stating that the PT is in extreme pain, cannot get up to move much, and is asking if something could be called into [facility]? He stated something about an MRI that was done on Tuesday and the Pt has 2 lesions on her brain. She has an upcoming appt with Dr. Enmanuel Sparks, but Pt is hoping to have something called in to ease the pain until appt. Contact #[phone]

## 2024-08-27 ENCOUNTER — OFFICE VISIT (OUTPATIENT)
Dept: PAIN MEDICINE | Facility: CLINIC | Age: 69
End: 2024-08-27
Payer: MEDICARE

## 2024-08-27 VITALS
DIASTOLIC BLOOD PRESSURE: 77 MMHG | HEART RATE: 82 BPM | BODY MASS INDEX: 12.86 KG/M2 | WEIGHT: 80 LBS | HEIGHT: 66 IN | SYSTOLIC BLOOD PRESSURE: 146 MMHG | RESPIRATION RATE: 20 BRPM

## 2024-08-27 DIAGNOSIS — M79.602 LEFT ARM PAIN: Chronic | ICD-10-CM

## 2024-08-27 DIAGNOSIS — M54.12 CERVICAL RADICULOPATHY: Primary | ICD-10-CM

## 2024-08-27 DIAGNOSIS — G62.9 NEUROPATHY: Chronic | ICD-10-CM

## 2024-08-27 DIAGNOSIS — M47.817 LUMBOSACRAL SPONDYLOSIS WITHOUT MYELOPATHY: Chronic | ICD-10-CM

## 2024-08-27 PROCEDURE — 99999 PR PBB SHADOW E&M-EST. PATIENT-LVL IV: CPT | Mod: PBBFAC,,, | Performed by: PHYSICIAN ASSISTANT

## 2024-08-27 PROCEDURE — 99214 OFFICE O/P EST MOD 30 MIN: CPT | Mod: PBBFAC | Performed by: PHYSICIAN ASSISTANT

## 2024-08-27 PROCEDURE — 99214 OFFICE O/P EST MOD 30 MIN: CPT | Mod: S$PBB,25,, | Performed by: PHYSICIAN ASSISTANT

## 2024-08-27 PROCEDURE — 99999PBSHW PR PBB SHADOW TECHNICAL ONLY FILED TO HB: Mod: PBBFAC,,,

## 2024-08-27 PROCEDURE — 96372 THER/PROPH/DIAG INJ SC/IM: CPT | Mod: PBBFAC | Performed by: PHYSICIAN ASSISTANT

## 2024-08-27 RX ORDER — TRAMADOL HYDROCHLORIDE 50 MG/1
50 TABLET ORAL EVERY 6 HOURS PRN
Qty: 120 TABLET | Refills: 2 | Status: SHIPPED | OUTPATIENT
Start: 2024-08-27 | End: 2024-08-30 | Stop reason: DRUGHIGH

## 2024-08-27 RX ORDER — KETOROLAC TROMETHAMINE 30 MG/ML
30 INJECTION, SOLUTION INTRAMUSCULAR; INTRAVENOUS
Status: COMPLETED | OUTPATIENT
Start: 2024-08-27 | End: 2024-08-27

## 2024-08-27 RX ORDER — GABAPENTIN 100 MG/1
100 CAPSULE ORAL 3 TIMES DAILY
Qty: 90 CAPSULE | Refills: 2 | Status: SHIPPED | OUTPATIENT
Start: 2024-08-27

## 2024-08-27 RX ORDER — TRAMADOL HYDROCHLORIDE 100 MG/1
100 TABLET, EXTENDED RELEASE ORAL DAILY
Qty: 30 TABLET | Refills: 2 | Status: SHIPPED | OUTPATIENT
Start: 2024-08-27

## 2024-08-27 RX ADMIN — KETOROLAC TROMETHAMINE 30 MG: 30 INJECTION, SOLUTION INTRAMUSCULAR at 01:08

## 2024-08-27 NOTE — PROGRESS NOTES
Subjective:         Patient ID: Lorraine Brambila is a 69 y.o. female.    Chief Complaint: Arm Pain and Shoulder Pain      Pain  This is a chronic problem. The current episode started more than 1 year ago. The problem occurs daily. The problem has been unchanged. Associated symptoms include arthralgias and nausea. Pertinent negatives include no anorexia, change in bowel habit, chest pain, chills, coughing, diaphoresis, fever, neck pain, rash, sore throat, urinary symptoms, vertigo or vomiting.     Review of Systems   Constitutional:  Negative for activity change, appetite change, chills, diaphoresis, fever and unexpected weight change.   HENT:  Negative for drooling, ear discharge, ear pain, facial swelling, nosebleeds, sore throat, trouble swallowing, voice change and goiter.    Eyes:  Negative for photophobia, pain, discharge, redness and visual disturbance.   Respiratory:  Negative for apnea, cough, choking, chest tightness, shortness of breath, wheezing and stridor.    Cardiovascular:  Negative for chest pain, palpitations and leg swelling.   Gastrointestinal:  Positive for nausea. Negative for abdominal distention, anorexia, change in bowel habit, diarrhea, rectal pain, vomiting and fecal incontinence.   Endocrine: Negative for cold intolerance, heat intolerance, polydipsia, polyphagia and polyuria.   Genitourinary:  Negative for bladder incontinence, dysuria, flank pain, frequency and hot flashes.   Musculoskeletal:  Positive for arthralgias, back pain and leg pain. Negative for neck pain.   Integumentary:  Negative for color change, pallor and rash.   Allergic/Immunologic: Negative for immunocompromised state.   Neurological:  Negative for dizziness, vertigo, seizures, syncope, facial asymmetry, speech difficulty, light-headedness, memory loss and coordination difficulties.   Hematological:  Negative for adenopathy. Does not bruise/bleed easily.   Psychiatric/Behavioral:  Negative for agitation,  behavioral problems, confusion, decreased concentration, dysphoric mood, hallucinations, self-injury and suicidal ideas. The patient is not nervous/anxious and is not hyperactive.            Past Medical History:   Diagnosis Date    COPD (chronic obstructive pulmonary disease)     Coronary artery disease     Fibromyalgia     History of non-ST elevation myocardial infarction (NSTEMI)     2019      Hyperlipidemia     Hypertension      Past Surgical History:   Procedure Laterality Date    CARDIAC CATHETERIZATION      HERNIA REPAIR      HYSTERECTOMY      OOPHORECTOMY       Social History     Socioeconomic History    Marital status:    Tobacco Use    Smoking status: Former     Current packs/day: 0.00     Average packs/day: 1 pack/day for 50.0 years (50.0 ttl pk-yrs)     Types: Cigarettes     Start date: 1969     Quit date: 2019     Years since quittin.4    Smokeless tobacco: Never   Substance and Sexual Activity    Alcohol use: Never    Drug use: Never    Sexual activity: Not Currently     Social Determinants of Health     Financial Resource Strain: Low Risk  (2024)    Overall Financial Resource Strain (CARDIA)     Difficulty of Paying Living Expenses: Not hard at all   Food Insecurity: No Food Insecurity (2024)    Hunger Vital Sign     Worried About Running Out of Food in the Last Year: Never true     Ran Out of Food in the Last Year: Never true   Physical Activity: Inactive (2024)    Exercise Vital Sign     Days of Exercise per Week: 0 days     Minutes of Exercise per Session: 30 min   Stress: No Stress Concern Present (2024)    [employer] - Occupational Stress Questionnaire     Feeling of Stress : Not at all   Housing Stability: Unknown (2024)    Housing Stability Vital Sign     Unable to Pay for Housing in the Last Year: No     Family History   Problem Relation Name Age of Onset    Heart attack Father      Heart disease  "Father       Review of patient's allergies indicates:  No Known Allergies     Objective:  Vitals:    08/27/24 1306   BP: (!) 146/77   Pulse: 82   Resp: 20   Weight: 36.3 kg (80 lb)   Height: 5' 6" (1.676 m)   PainSc:   9             Physical Exam  Vitals and nursing note reviewed. Exam conducted with a chaperone present.   Constitutional:       General: She is awake. She is not in acute distress.     Appearance: Normal appearance. She is not ill-appearing, toxic-appearing or diaphoretic.   HENT:      Head: Normocephalic and atraumatic.      Nose: Nose normal.      Mouth/Throat:      Mouth: Mucous membranes are moist.      Pharynx: Oropharynx is clear.   Eyes:      Conjunctiva/sclera: Conjunctivae normal.      Pupils: Pupils are equal, round, and reactive to light.   Cardiovascular:      Rate and Rhythm: Normal rate.   Pulmonary:      Effort: Pulmonary effort is normal. No respiratory distress.   Abdominal:      Palpations: Abdomen is soft.      Tenderness: There is no guarding.   Musculoskeletal:         General: Normal range of motion.      Cervical back: Normal range of motion and neck supple. No rigidity.   Skin:     General: Skin is warm and dry.      Coloration: Skin is not jaundiced or pale.   Neurological:      General: No focal deficit present.      Mental Status: She is alert and oriented to person, place, and time. Mental status is at baseline.      Cranial Nerves: No cranial nerve deficit (II-XII).   Psychiatric:         Mood and Affect: Mood normal.         Behavior: Behavior normal. Behavior is cooperative.         Thought Content: Thought content normal.         MRI Shoulder Without Contrast Left  Narrative: EXAMINATION:  MRI SHOULDER WITHOUT CONTRAST LEFT    CLINICAL HISTORY:  left shoulder pain, history fracture to humerus; Pain in left shoulder    TECHNIQUE:  Axial, sagittal, and coronal oblique imaging of the left shoulder is performed using T1, T2, proton density fat-sat and gradient sequences. No " contrast was used.    COMPARISON:  X-ray 6 August 2024    FINDINGS:  There is mild degenerative change of the acromioclavicular joint and the joint alignment is normal. The acromion appears normally formed.    The supraspinatus , infraspinatus , subscapularis and biceps tendons are intact and show no evidence of tearing.    Biceps labral anchor and labrum appear within normal limits for non-arthrogram evaluation.    There is previous fracture of the proximal humerus with impaction and incomplete healing there is edema in the proximal humeral metaphysis marrow signal appears within normal limits. No distinct chondral defects are identified.  Impression: Mild acromioclavicular joint osteoarthrosis.  Previous humerus fracture with suggestion of incomplete healing.  No other evidence of significant shoulder abnormality.    Electronically signed by: Sai tSeinberg  Date:    08/20/2024  Time:    15:33  MRI Cervical Spine Without Contrast  Narrative: EXAMINATION:  MRI CERVICAL SPINE WITHOUT CONTRAST    CLINICAL HISTORY:  acute neck pain; recent ER visit;.  Cervicalgia    TECHNIQUE:  Multiplanar multisequence MRI cervical spine performed without intravenous contrast.    COMPARISON:  Cervical spine radiograph 08/06/2024    FINDINGS:  There is a marrow placing lesion that is seen involving the bilateral pedicles, lamina, and spinous process of C6.  There is indentation along the posterior thecal sac and effacement of CSF within the canal at this level.  No abnormal cord signal detected at this level.  No abnormal cord signal elsewhere.  No additional osseous lesion detected.  The vertebral body heights and alignment are maintained.  Disc degeneration throughout.    C2-3: No spinal canal or foraminal stenosis.    C3-4: No spinal canal stenosis.  Uncovertebral and facet degeneration.  Mild right foraminal stenosis.  No left foraminal stenosis.    C4-5: No spinal canal stenosis.  Uncovertebral and facet degeneration.  Mild  bilateral foraminal stenosis.    C5-6: Disc bulging present.  The lesion of C6 at this level and immediately inferior contributes to posterior indentation along the thecal sac and deforms the posterior margin of the cord.  Moderate spinal canal stenosis.  Moderate bilateral foraminal stenosis.    C6-7: Disc bulge.  Mild spinal canal and bilateral foraminal stenosis.    C7-T1: No spinal canal or foraminal stenosis.  Impression: Osseous lesion of C6 posterior element as detailed contributing to spinal canal and foraminal stenosis.  This is concerning for metastatic disease.  Dedicated chest, abdomen, and pelvic CT recommended for further assessment and biopsy planning.    Electronically signed by: Abdirahman Middleton  Date:    08/20/2024  Time:    14:51       Office Visit on 08/06/2024   Component Date Value Ref Range Status    Sodium 08/06/2024 136  136 - 145 mmol/L Final    Potassium 08/06/2024 4.6  3.5 - 5.1 mmol/L Final    Chloride 08/06/2024 98  98 - 107 mmol/L Final    CO2 08/06/2024 26  21 - 32 mmol/L Final    Anion Gap 08/06/2024 17 (H)  7 - 16 mmol/L Final    Glucose 08/06/2024 93  74 - 106 mg/dL Final    BUN 08/06/2024 14  7 - 18 mg/dL Final    Creatinine 08/06/2024 0.82  0.55 - 1.02 mg/dL Final    BUN/Creatinine Ratio 08/06/2024 17  6 - 20 Final    Calcium 08/06/2024 11.0 (H)  8.5 - 10.1 mg/dL Final    Total Protein 08/06/2024 8.1  6.4 - 8.2 g/dL Final    Albumin 08/06/2024 4.4  3.5 - 5.0 g/dL Final    Globulin 08/06/2024 3.7  2.0 - 4.0 g/dL Final    A/G Ratio 08/06/2024 1.2   Final    Bilirubin, Total 08/06/2024 0.3  >0.0 - 1.2 mg/dL Final    Alk Phos 08/06/2024 140  55 - 142 U/L Final    ALT 08/06/2024 21  13 - 56 U/L Final    AST 08/06/2024 24  15 - 37 U/L Final    eGFR 08/06/2024 77  >=60 mL/min/1.73m2 Final    Triglycerides 08/06/2024 106  35 - 150 mg/dL Final    Cholesterol 08/06/2024 180  0 - 200 mg/dL Final    HDL Cholesterol 08/06/2024 77 (H)  40 - 60 mg/dL Final    Cholesterol/HDL  Ratio (Risk Factor) 08/06/2024 2.3   Final    Non-HDL 08/06/2024 103  mg/dL Final    LDL Calculated 08/06/2024 82  mg/dL Final    LDL/HDL 08/06/2024 1.1   Final    VLDL 08/06/2024 21  mg/dL Final    WBC 08/06/2024 5.34  4.50 - 11.00 K/uL Final    RBC 08/06/2024 4.39  4.20 - 5.40 M/uL Final    Hemoglobin 08/06/2024 13.5  12.0 - 16.0 g/dL Final    Hematocrit 08/06/2024 41.3  38.0 - 47.0 % Final    MCV 08/06/2024 94.1  80.0 - 96.0 fL Final    MCH 08/06/2024 30.8  27.0 - 31.0 pg Final    MCHC 08/06/2024 32.7  32.0 - 36.0 g/dL Final    RDW 08/06/2024 12.3  11.5 - 14.5 % Final    Platelet Count 08/06/2024 258  150 - 400 K/uL Final    MPV 08/06/2024 9.5  9.4 - 12.4 fL Final    Neutrophils % 08/06/2024 58.4  53.0 - 65.0 % Final    Lymphocytes % 08/06/2024 28.1  27.0 - 41.0 % Final    Monocytes % 08/06/2024 8.6 (H)  2.0 - 6.0 % Final    Eosinophils % 08/06/2024 3.6  1.0 - 4.0 % Final    Basophils % 08/06/2024 0.9  0.0 - 1.0 % Final    Immature Granulocytes % 08/06/2024 0.4  0.0 - 0.4 % Final    nRBC, Auto 08/06/2024 0.0  <=0.0 % Final    Neutrophils, Abs 08/06/2024 3.12  1.80 - 7.70 K/uL Final    Lymphocytes, Absolute 08/06/2024 1.50  1.00 - 4.80 K/uL Final    Monocytes, Absolute 08/06/2024 0.46  0.00 - 0.80 K/uL Final    Eosinophils, Absolute 08/06/2024 0.19  0.00 - 0.50 K/uL Final    Basophils, Absolute 08/06/2024 0.05  0.00 - 0.20 K/uL Final    Immature Granulocytes, Absolute 08/06/2024 0.02  0.00 - 0.04 K/uL Final    nRBC, Absolute 08/06/2024 0.00  <=0.00 x10e3/uL Final    Diff Type 08/06/2024 Auto   Final   Office Visit on 07/31/2024   Component Date Value Ref Range Status    QRS Duration 07/31/2024 86  ms Final    [facility] QTC Calculation 07/31/2024 418  ms Final   Office Visit on 06/05/2024   Component Date Value Ref Range Status    POC Amphetamines 06/05/2024 Negative  Negative, Inconclusive Final    POC Barbiturates 06/05/2024 Negative  Negative, Inconclusive Final    POC Benzodiazepines  06/05/2024 Negative  Negative, Inconclusive Final    POC Cocaine 06/05/2024 Negative  Negative, Inconclusive Final    POC THC 06/05/2024 Negative  Negative, Inconclusive Final    POC Methadone 06/05/2024 Negative  Negative, Inconclusive Final    POC Methamphetamine 06/05/2024 Negative  Negative, Inconclusive Final    POC Opiates 06/05/2024 Negative  Negative, Inconclusive Final    POC Oxycodone 06/05/2024 Negative  Negative, Inconclusive Final    POC Phencyclidine 06/05/2024 Negative  Negative, Inconclusive Final    POC Methylenedioxymethamphetamine * 06/05/2024 Negative  Negative, Inconclusive Final    POC Tricyclic Antidepressants 06/05/2024 Negative  Negative, Inconclusive Final    POC Buprenorphine 06/05/2024 Negative   Final     Acceptable 06/05/2024 Yes   Final    POC Temperature (Urine) 06/05/2024 90   Final         No orders of the defined types were placed in this encounter.      Requested Prescriptions     Signed Prescriptions Disp Refills    gabapentin (NEURONTIN) 100 MG capsule 90 capsule 2     Sig: Take 1 capsule (100 mg total) by mouth 3 (three) times daily.    traMADoL (ULTRAM) 50 mg tablet 120 tablet 2     Sig: Take 1 tablet (50 mg total) by mouth every 6 (six) hours as needed for Pain.    traMADoL (ULTRAM-ER) 100 MG Tb24 30 tablet 2     Sig: Take 1 tablet (100 mg total) by mouth once daily.       Assessment:     1. Cervical radiculopathy    2. Neuropathy    3. Left arm pain    4. Lumbosacral spondylosis without myelopathy             A's of Opioid Risk Assessment  Activity:Patient can perform ADL.   Analgesia:Patients pain is partially controlled by current medication. Patient has tried OTC medications such as Tylenol and Ibuprofen with out relief.   Adverse Effects: Patient denies constipation or sedation.  Aberrant Behavior:  reviewed with no aberrant drug seeking/taking behavior.  Overdose reversal drug naloxone discussed    Drug screen reviewed      Physical  therapy [facility] December 28, 2023 through February 27, 2024    Bone density study March 14, 2023 osteoporosis        Plan:    September 19, 2023 vitamin-D level 19    Vitamin-D replacement 50,000 units 1 capsule p.o. q.week times 8 week      Narcan August 2024       [place]    Presumptive drug screen negative, this is expected result, patient takes tramadol, cup does not test for tramadol       Patient has discontinued benzodiazepine       Having severe neck pain arm pain numbness and tingling radicular in nature     MRI cervical spine [facility] August 20, 2024  Osseous lesion of C6 posterior element as detailed contributing to spinal canal and foraminal stenosis. This is concerning for metastatic disease. Dedicated chest, abdomen, and pelvic CT recommended for further assessment and biopsy planning.     Has referral spine surgery [facility] September 12, 2024 for evaluation above lesions    Will add tramadol 100 mg extended-release tablet 1 p.o. q.day     Patient states short-acting tramadol continues to help    Continue home exercise program as directed    Continue current medication as directed     Follow-up 3 months    Dr. Osorio January 2025    Bring original prescription medication bottles/container/box with labels to each visit

## 2024-08-29 ENCOUNTER — HOSPITAL ENCOUNTER (OUTPATIENT)
Dept: RADIOLOGY | Facility: HOSPITAL | Age: 69
Discharge: HOME | End: 2024-08-29
Attending: ORTHOPAEDIC SURGERY
Payer: MEDICARE

## 2024-08-29 ENCOUNTER — OFFICE VISIT (OUTPATIENT)
Dept: SPINE | Facility: CLINIC | Age: 69
End: 2024-08-29
Payer: MEDICARE

## 2024-08-29 DIAGNOSIS — R93.89 ABNORMAL FINDINGS ON DIAGNOSTIC IMAGING OF OTHER SPECIFIED BODY STRUCTURES: ICD-10-CM

## 2024-08-29 DIAGNOSIS — M54.12 CERVICAL RADICULOPATHY: ICD-10-CM

## 2024-08-29 DIAGNOSIS — M54.12 CERVICAL RADICULOPATHY: Primary | ICD-10-CM

## 2024-08-29 DIAGNOSIS — R93.7 ABNORMAL MRI, CERVICAL SPINE: ICD-10-CM

## 2024-08-29 PROCEDURE — 99999 PR PBB SHADOW E&M-EST. PATIENT-LVL V: CPT | Mod: PBBFAC,,, | Performed by: ORTHOPAEDIC SURGERY

## 2024-08-29 PROCEDURE — 99215 OFFICE O/P EST HI 40 MIN: CPT | Mod: PBBFAC,25 | Performed by: ORTHOPAEDIC SURGERY

## 2024-08-29 PROCEDURE — 72050 X-RAY EXAM NECK SPINE 4/5VWS: CPT | Mod: TC

## 2024-08-29 NOTE — PATIENT INSTRUCTIONS
Your CT is scheduled for 09/03 @10:00AM at [facility] [place] in [place]-Nothing to eat or drink after midnight the night before.      Biopsy of C6 Lesion is scheduled for 09/10 @ 7:30AM at [facility]- Use the ER entrance to go to Radiology department.  Stop Plavix 5 days before procedure.   Nothing to eat or drink after midnight the night before procedure.   Call [phone] to Pre-register 3 days before the procedure.

## 2024-08-29 NOTE — PROGRESS NOTES
AP, lateral, flexion/extension views of the cervical spine reviewed    On the AP there is normal coronal alignment.  There is uncovertebral and facet hypertrophy seen.  On the lateral there is loss of cervical lordosis.  There are spondylotic changes noted with decrease in disc height and osteophyte formation seen.  No fractures or listhesis noted.  No instability on flexion-extension views.  Prior C5-C7 laminectomy    Impression:  Degenerative changes of cervical spine as noted above

## 2024-08-29 NOTE — PROGRESS NOTES
MDM/time:  45-50 minutes spent on this encounter including 15 minutes reviewing imaging and notes, 20 minutes with the patient, 10 minutes documentation    ASSESSMENT:  69 y.o. 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 female here for evaluation of left sided neck pain that radiates into the left shoulder and shoulder blade and down the left arm.  Patient reports pain started suddenly 6 weeks ago thought It was a muscle spasm but pain has worsen with weakness into the left arm.  Patient reports decreased  strength in the left hand.  No issues with balance or falls.  Denies bladder bowel incontinence.  No difficulty walking distances.  Currently takes tramadol as needed for pain and Neurontin 100 mg 3 times a day that was just recently started by pain management.  No prior physical therapy.  Has been seen by pain management and referred to our office for evaluation.  Recent MRI 08/20/2024.  No prior spine surgery.  Patient is not a smoker.  Patient is currently on Plavix for a past MI. patient reports family history of cancer brother with esophageal cancer and another brother who had colon cancer with Mets to the liver.      IMAGING:  AP, lateral, flexion/extension views of the cervical spine reviewed     On the AP there is normal coronal alignment.  There is uncovertebral and facet hypertrophy seen.  On the lateral there is loss of cervical lordosis.  There are spondylotic changes noted with decrease in disc height and osteophyte formation seen.  No fractures or listhesis noted.  No instability on flexion-extension views.  Prior C5-C7 laminectomy     Impression:  Degenerative changes of cervical spine as noted above       Past Medical History:   Diagnosis Date    COPD (chronic obstructive pulmonary disease)     Coronary artery disease      Fibromyalgia     History of non-ST elevation myocardial infarction (NSTEMI)     2019      Hyperlipidemia     Hypertension      Past Surgical History:   Procedure Laterality Date    CARDIAC CATHETERIZATION      HERNIA REPAIR      HYSTERECTOMY      OOPHORECTOMY       Social History     Tobacco Use    Smoking status: Former     Current packs/day: 0.00     Average packs/day: 1 pack/day for 50.0 years (50.0 ttl pk-yrs)     Types: Cigarettes     Start date: 1969     Quit date: 2019     Years since quittin.4    Smokeless tobacco: Never   Substance Use Topics    Alcohol use: Never    Drug use: Never      Current Outpatient Medications   Medication Instructions    ALPRAZolam (XANAX) 0.5 MG tablet Take Xanax 0.5 mg po x 1 dose, 30 minutes before MRI.    atorvastatin (LIPITOR) 40 mg, Oral, Nightly    clopidogreL (PLAVIX) 75 mg, Oral, Daily    gabapentin (NEURONTIN) 100 mg, Oral, 3 times daily    metoprolol succinate (TOPROL-XL) 50 mg, Oral    mirtazapine (REMERON) 45 mg, Oral, Nightly    nitroGLYCERIN (NITROSTAT) 0.4 mg, Sublingual, Every 5 min PRN    ondansetron (ZOFRAN) 4 mg, Oral, Every 8 hours PRN    pantoprazole (PROTONIX) 40 MG tablet No dose, route, or frequency recorded.    traMADoL (ULTRAM) 50 mg, Oral, Every 6 hours PRN    traMADoL (ULTRAM-ER) 100 mg, Oral, Daily        EXAM:  Constitutional  General Appearance:  There is no height or weight on file to calculate BMI., NAD  Psychiatric   Orientation: Oriented to time, oriented to place, oriented to person  Mood and Affect: Active and alert, normal mood, normal affect  Gait and Station   Appearance:  Antalgic gait, unable to tandem gait, unable to walk on toes, unable to walk on heels    CERVICAL  Musculoskeletal System  Shoulder:  normal appearance, no instability, no tenderness, normal ROM right,  decreased ROM left, Pain with ROM    Cervical Spine  Inspection:  alignment normal, no muscle atrophy  Soft Tissue Palpation on the Right:  no tenderness of the  paracervicals, no tenderness of the trapezius, no tenderness of the rhomboid  Soft Tissue Palpation on the Left:  no tenderness of the paracervicals, no tenderness of the trapezius, no tenderness of the rhomboid  Bony Palpation:  no tenderness of the occipital protuberance  Active Range:     Flexion normal, Extension normal, Rotation to the left normal, Rotation to the right normal, Lateral flexion to the left normal, Lateral flexion to the right normal   No pain elicited on motion    Motor Strength  C5 on the right:  abduction deltoid 4/5  C5 on the left:  abduction deltoid 3/5  C6 on the Right:  flexion biceps 5/5, wrist extension 5/5  C6 on the Left:  flexion biceps 3/5, wrist extension 3/5  C7 on the Right:  extension fingers 5/5, extension triceps 5/5  C7 on the Left:  extension fingers 3/5, extension triceps 3/5  C8 on the Right:  flexion fingers 5/5  C8 on the Left:  flexion fingers 3/5  T1 on the Right:  abduction fingers 5/5  T1 on the Left:  abduction fingers 3/5    Neurological System  Sensation on the Right:  normal sensation of the extremities: right, normal median nerve distribution, normal ulnar nerve distribution  Sensation on the Left:  normal sensation of the extremities: left, normal median nerve distribution, normal ulnar nerve distribution  Biceps Reflex Right:  normal, Brachioradialis Reflex Right:  normal, Triceps Reflex Right: normal  Biceps Reflex Left:  normal, Brachioradialis Reflex Left: normal, Triceps Reflex Left:  normal  Special Test on the Right:  Spurlings test negative, Hoffmans reflex absent, no ankle clonus, Durkan test negative, Tinels sign negative at the elbow  Special Test on the Left:  Spurlings test negative, Hoffmans reflex absent, no ankle clonus, Durkan test negative, Tinels sign negative at the elbow    Skin:   Head and Neck:  normal   Right Upper Extremity:  normal   Left Upper Extremity:  normal    Cardiovascular:   Arterial Pulses Right:  radial right   Arterial  Pulses Left:  radial left   Edema Right:  none   Edema Left:  none

## 2024-08-30 RX ORDER — OXYCODONE AND ACETAMINOPHEN 5; 325 MG/1; MG/1
1 TABLET ORAL EVERY 4 HOURS PRN
Qty: 45 TABLET | Refills: 0 | Status: SHIPPED | OUTPATIENT
Start: 2024-08-30

## 2024-09-03 ENCOUNTER — TELEPHONE (OUTPATIENT)
Dept: PRIMARY CARE CLINIC | Facility: CLINIC | Age: 69
End: 2024-09-03
Payer: MEDICARE

## 2024-09-03 NOTE — TELEPHONE ENCOUNTER
----- Message from Aury Groves sent at 9/3/2024  9:31 AM CDT -----  Call , Abdirahman, regarding getting one Xanax called in to [facility] before Mrs Brambila's scan tomorrow. He said Alexsander got it sent in for her last scan. His #[phone]. If he doesn't answer just leave him a message.

## 2024-09-03 NOTE — TELEPHONE ENCOUNTER
----- Message from Daphney Sparks sent at 9/3/2024  2:46 PM CDT -----  Who Called: Lorraine Brambila    Caller is requesting assistance/information from provider's office.          Preferred Method of Contact: Phone Call  Patient's Preferred Phone Number on File: [phone]   Best Call Back Number, if different:  Additional Information: calling to see if a xanax can be called into medical [facility] for her ct scan tomorrow

## 2024-09-03 NOTE — TELEPHONE ENCOUNTER
----- Message from Alexsander Johnson DNP, FNP-C sent at 9/3/2024  1:13 PM CDT -----  Regarding: re: Xanax  I think who ever is ordering the scan has to order the medication.  ----- Message -----  From: Shannan Gray LPN  Sent: 9/3/2024  11:53 AM CDT  To: Alexsander Johnson DNP, FNP-C      ----- Message -----  From: Aury Groves  Sent: 9/3/2024   9:35 AM CDT  To: Alex [facility] Staff    Call , Abdirahman, regarding getting one Xanax called in to [facility] before Mrs Brambila's scan tomorrow. He said Alexsander got it sent in for her last scan. His #[phone]. If he doesn't answer just leave him a message.

## 2024-09-04 RX ORDER — ALPRAZOLAM 1 MG/1
1 TABLET ORAL SEE ADMIN INSTRUCTIONS
Qty: 1 TABLET | Refills: 0 | Status: SHIPPED | OUTPATIENT
Start: 2024-09-04 | End: 2024-09-05

## 2024-09-06 ENCOUNTER — HOSPITAL ENCOUNTER (OUTPATIENT)
Dept: RADIOLOGY | Facility: HOSPITAL | Age: 69
Discharge: HOME | End: 2024-09-06
Attending: ORTHOPAEDIC SURGERY
Payer: MEDICARE

## 2024-09-06 DIAGNOSIS — R93.7 ABNORMAL MRI, CERVICAL SPINE: ICD-10-CM

## 2024-09-06 DIAGNOSIS — R93.89 ABNORMAL FINDINGS ON DIAGNOSTIC IMAGING OF OTHER SPECIFIED BODY STRUCTURES: ICD-10-CM

## 2024-09-06 PROCEDURE — 71270 CT THORAX DX C-/C+: CPT | Mod: 26,,, | Performed by: RADIOLOGY

## 2024-09-06 PROCEDURE — 71270 CT THORAX DX C-/C+: CPT | Mod: TC

## 2024-09-06 PROCEDURE — 72127 CT NECK SPINE W/O & W/DYE: CPT | Mod: TC

## 2024-09-06 PROCEDURE — 74178 CT ABD&PLV WO CNTR FLWD CNTR: CPT | Mod: 26,,, | Performed by: RADIOLOGY

## 2024-09-06 PROCEDURE — 25500020 PHARM REV CODE 255: Performed by: ORTHOPAEDIC SURGERY

## 2024-09-06 RX ORDER — IOPAMIDOL 755 MG/ML
100 INJECTION, SOLUTION INTRAVASCULAR
Status: COMPLETED | OUTPATIENT
Start: 2024-09-06 | End: 2024-09-06

## 2024-09-06 RX ADMIN — IOPAMIDOL 85 ML: 755 INJECTION, SOLUTION INTRAVENOUS at 11:09

## 2024-09-09 ENCOUNTER — TELEPHONE (OUTPATIENT)
Dept: SPINE | Facility: CLINIC | Age: 69
End: 2024-09-09
Payer: MEDICARE

## 2024-09-09 ENCOUNTER — TELEPHONE (OUTPATIENT)
Dept: PAIN MEDICINE | Facility: CLINIC | Age: 69
End: 2024-09-09
Payer: MEDICARE

## 2024-09-09 DIAGNOSIS — N28.89 RENAL MASS: Primary | ICD-10-CM

## 2024-09-09 NOTE — TELEPHONE ENCOUNTER
Spoke with spouse- he is asking for an rx for xanax for her for daily use. I will discuss with DR. Sparks when he returns to clinic tomorrow.     Discussed Dr. Sparkss review of CT scan. Per Dr. Sparsk she has renal mass that is concerning for metastatic disease. He recommends referral to Dr. Harman and to Urology. She is set up for a biopsy tomorrow of C6 lesionat  [facility].       ----- Message from Joann Gleason sent at 9/9/2024 10:12 AM CDT -----  Regarding: RX  Who Called: Lorraine Brambila    Refill or New Rx:New Rx  RX Name and Strength:xanax 1/2 mg  How is the patient currently taking it? (ex. 1XDay):PRN  Is this a 30 day or 90 day RX:PRN  Local or Mail Order:  List of preferred pharmacies on file (remove unneeded): [unfilled]  If different Pharmacy is requested, enter Pharmacy information here including location and phone number: [facility] [place] [address] [facility] [address]  Phone: [phone] Fax: [phone]       Ordering Provider:Enmanuel Sparks       Preferred Method of Contact: Phone Call  Patient's Preferred Phone Number on File: [phone]   Best Call Back Number, if different:  Additional Information:  [phone]            Refill or New Rx:Refill   RX Name and Strength:traMADoL (ULTRAM-ER) 100 MG Tb24  How is the patient currently taking it? (ex. 1XDay):PRN  Is this a 30 day or 90 day RX:30  Local or Mail Order:  List of preferred pharmacies on file (remove unneeded): [unfilled]  If different Pharmacy is requested, enter Pharmacy information here including location and phone number: [facility] [place] [employer] [facility] [address] [facility] [address]  Phone: [phone] Fax: [phone]

## 2024-09-09 NOTE — TELEPHONE ENCOUNTER
----- Message from Billy Regan sent at 9/6/2024 12:27 PM CDT -----  Patient's  called needing to speak to the nurse about getting something to help the patient with the pain she has.      [phone]    REBECCA VILLALBA   09/09/2024   3:25 PM   Attempted to call no answer.

## 2024-09-13 ENCOUNTER — PATIENT MESSAGE (OUTPATIENT)
Dept: SPINE | Facility: CLINIC | Age: 69
End: 2024-09-13
Payer: MEDICARE

## 2024-09-16 ENCOUNTER — TELEPHONE (OUTPATIENT)
Dept: SPINE | Facility: CLINIC | Age: 69
End: 2024-09-16
Payer: MEDICARE

## 2024-09-16 NOTE — TELEPHONE ENCOUNTER
Called and spoke with spouse to discuss biopsy results- pathology reports does not show that any bone was collected in the specimen, only soft tissue, connective tissue.     Discussed with Dr. Sparks and he wanted us to notify Dr. Tamayo office. Patient has appt on Wednesday with Dr. Harman. I called and spoke with his nurse and she asked that we fax results over and patient to keep appt for Wednesday. Notified spouse of this. He verbalizes understanding.

## 2024-09-18 RX ORDER — ALPRAZOLAM 1 MG/1
1 TABLET ORAL 2 TIMES DAILY PRN
Qty: 30 TABLET | Refills: 0 | Status: SHIPPED | OUTPATIENT
Start: 2024-09-18 | End: 2024-10-18

## 2024-09-18 RX ORDER — TRAMADOL HYDROCHLORIDE 50 MG/1
50 TABLET ORAL EVERY 6 HOURS PRN
Qty: 45 TABLET | Refills: 0 | Status: SHIPPED | OUTPATIENT
Start: 2024-09-18

## 2024-09-18 NOTE — TELEPHONE ENCOUNTER
Called patient to see if they are going to the oncology appt today. Yesterday her  had messaged us saying she wasn't going to be able to make it to the appt due to her pain. He said that they are planning on going to that appt but he is asking for a refill on tramadol and xanax for her to help with the ride to the doctor. He states that when she took the xanax the day of her Mri she ate well and slept well.     Discussed with Nishi, she is agreeable.

## 2024-09-30 DIAGNOSIS — I25.10 CORONARY ARTERY DISEASE INVOLVING NATIVE CORONARY ARTERY OF NATIVE HEART WITHOUT ANGINA PECTORIS: ICD-10-CM

## 2024-09-30 RX ORDER — CLOPIDOGREL BISULFATE 75 MG/1
75 TABLET ORAL DAILY
Qty: 90 TABLET | Refills: 2 | Status: SHIPPED | OUTPATIENT
Start: 2024-09-30 | End: 2025-06-27

## 2024-09-30 NOTE — TELEPHONE ENCOUNTER
----- Message from Graeme Rebolledo sent at 9/30/2024  4:15 PM CDT -----  R/F on blood thinner- Mail Order Pharmacy

## 2025-03-04 DIAGNOSIS — I99.8 BLOOD PRESSURE INSTABILITY: ICD-10-CM

## 2025-03-04 DIAGNOSIS — I25.10 CORONARY ARTERY DISEASE, UNSPECIFIED VESSEL OR LESION TYPE, UNSPECIFIED WHETHER ANGINA PRESENT, UNSPECIFIED WHETHER NATIVE OR TRANSPLANTED HEART: ICD-10-CM

## 2025-03-04 DIAGNOSIS — R00.2 PALPITATIONS: ICD-10-CM

## 2025-03-04 RX ORDER — METOPROLOL SUCCINATE 50 MG/1
50 TABLET, EXTENDED RELEASE ORAL DAILY
Qty: 90 TABLET | Refills: 1 | Status: SHIPPED | OUTPATIENT
Start: 2025-03-04 | End: 2026-03-04

## 2025-03-23 ENCOUNTER — HOSPITAL ENCOUNTER (EMERGENCY)
Facility: HOSPITAL | Age: 70
Discharge: ANOTHER HOSPITAL | End: 2025-03-24
Attending: EMERGENCY MEDICINE
Payer: MEDICARE

## 2025-03-23 DIAGNOSIS — K66.8 FREE INTRAPERITONEAL AIR: ICD-10-CM

## 2025-03-23 DIAGNOSIS — K56.609 SMALL BOWEL OBSTRUCTION: ICD-10-CM

## 2025-03-23 DIAGNOSIS — R10.84 GENERALIZED ABDOMINAL PAIN: Primary | ICD-10-CM

## 2025-03-23 DIAGNOSIS — R11.2 NAUSEA AND VOMITING, UNSPECIFIED VOMITING TYPE: ICD-10-CM

## 2025-03-23 LAB
ALBUMIN SERPL BCP-MCNC: 3 G/DL (ref 3.4–4.8)
ALBUMIN/GLOB SERPL: 0.5 {RATIO}
ALP SERPL-CCNC: 130 U/L (ref 40–150)
ALT SERPL W P-5'-P-CCNC: 36 U/L
ANION GAP SERPL CALCULATED.3IONS-SCNC: 23 MMOL/L (ref 7–16)
ANISOCYTOSIS BLD QL SMEAR: ABNORMAL
AST SERPL W P-5'-P-CCNC: 30 U/L (ref 11–45)
BACTERIA #/AREA URNS HPF: ABNORMAL /HPF
BASOPHILS # BLD AUTO: 0.03 K/UL (ref 0–0.2)
BASOPHILS NFR BLD AUTO: 0.5 % (ref 0–1)
BILIRUB SERPL-MCNC: 1 MG/DL
BILIRUB UR QL STRIP: ABNORMAL
BUN SERPL-MCNC: 35 MG/DL (ref 10–20)
BUN/CREAT SERPL: 26 (ref 6–20)
CALCIUM SERPL-MCNC: 10.5 MG/DL (ref 8.4–10.2)
CHLORIDE SERPL-SCNC: 82 MMOL/L (ref 98–107)
CLARITY UR: ABNORMAL
CO2 SERPL-SCNC: 35 MMOL/L (ref 23–31)
COLOR UR: ABNORMAL
CREAT SERPL-MCNC: 1.35 MG/DL (ref 0.55–1.02)
DIFFERENTIAL METHOD BLD: ABNORMAL
EGFR (NO RACE VARIABLE) (RUSH/TITUS): 42 ML/MIN/1.73M2
EOSINOPHIL # BLD AUTO: 0 K/UL (ref 0–0.5)
EOSINOPHIL NFR BLD AUTO: 0 % (ref 1–4)
ERYTHROCYTE [DISTWIDTH] IN BLOOD BY AUTOMATED COUNT: 15.8 % (ref 11.5–14.5)
GLOBULIN SER-MCNC: 5.5 G/DL (ref 2–4)
GLUCOSE SERPL-MCNC: 198 MG/DL (ref 82–115)
GLUCOSE UR STRIP-MCNC: NEGATIVE MG/DL
HCT VFR BLD AUTO: 38.3 % (ref 38–47)
HGB BLD-MCNC: 13.5 G/DL (ref 12–16)
IMM GRANULOCYTES # BLD AUTO: 0.03 K/UL (ref 0–0.04)
IMM GRANULOCYTES NFR BLD: 0.5 % (ref 0–0.4)
KETONES UR STRIP-SCNC: NEGATIVE MG/DL
LEUKOCYTE ESTERASE UR QL STRIP: ABNORMAL
LIPASE SERPL-CCNC: 5 U/L
LYMPHOCYTES # BLD AUTO: 0.37 K/UL (ref 1–4.8)
LYMPHOCYTES NFR BLD AUTO: 6.3 % (ref 27–41)
LYMPHOCYTES NFR BLD MANUAL: 6 % (ref 27–41)
MAGNESIUM SERPL-MCNC: 3.3 MG/DL (ref 1.6–2.6)
MCH RBC QN AUTO: 34.2 PG (ref 27–31)
MCHC RBC AUTO-ENTMCNC: 35.2 G/DL (ref 32–36)
MCV RBC AUTO: 97 FL (ref 80–96)
MONOCYTES # BLD AUTO: 0.13 K/UL (ref 0–0.8)
MONOCYTES NFR BLD AUTO: 2.2 % (ref 2–6)
MONOCYTES NFR BLD MANUAL: 2 % (ref 2–6)
MPC BLD CALC-MCNC: 8.6 FL (ref 9.4–12.4)
NEUTROPHILS # BLD AUTO: 5.3 K/UL (ref 1.8–7.7)
NEUTROPHILS NFR BLD AUTO: 90.5 % (ref 53–65)
NEUTS BAND NFR BLD MANUAL: 25 % (ref 1–5)
NEUTS SEG NFR BLD MANUAL: 67 % (ref 50–62)
NITRITE UR QL STRIP: NEGATIVE
NRBC # BLD AUTO: 0 X10E3/UL
NRBC, AUTO (.00): 0 %
OCCULT BLOOD: NEGATIVE
PH UR STRIP: 7.5 PH UNITS
PLATELET # BLD AUTO: 236 K/UL (ref 150–400)
PLATELET MORPHOLOGY: NORMAL
POTASSIUM SERPL-SCNC: 3.9 MMOL/L (ref 3.5–5.1)
PROT SERPL-MCNC: 8.5 G/DL (ref 5.8–7.6)
PROT UR QL STRIP: 100
RBC # BLD AUTO: 3.95 M/UL (ref 4.2–5.4)
RBC # UR STRIP: ABNORMAL /UL
RBC #/AREA URNS HPF: ABNORMAL /HPF
SODIUM SERPL-SCNC: 136 MMOL/L (ref 136–145)
SP GR UR STRIP: 1.02
SQUAMOUS #/AREA URNS LPF: ABNORMAL /LPF
UROBILINOGEN UR STRIP-ACNC: 0.2 MG/DL
WBC # BLD AUTO: 5.86 K/UL (ref 4.5–11)
WBC #/AREA URNS HPF: ABNORMAL /HPF

## 2025-03-23 PROCEDURE — 96365 THER/PROPH/DIAG IV INF INIT: CPT

## 2025-03-23 PROCEDURE — 99285 EMERGENCY DEPT VISIT HI MDM: CPT | Performed by: SPECIALIST

## 2025-03-23 PROCEDURE — 85025 COMPLETE CBC W/AUTO DIFF WBC: CPT | Performed by: EMERGENCY MEDICINE

## 2025-03-23 PROCEDURE — 87086 URINE CULTURE/COLONY COUNT: CPT | Performed by: EMERGENCY MEDICINE

## 2025-03-23 PROCEDURE — 83690 ASSAY OF LIPASE: CPT | Performed by: EMERGENCY MEDICINE

## 2025-03-23 PROCEDURE — 96375 TX/PRO/DX INJ NEW DRUG ADDON: CPT

## 2025-03-23 PROCEDURE — 63600175 PHARM REV CODE 636 W HCPCS: Performed by: SPECIALIST

## 2025-03-23 PROCEDURE — 80053 COMPREHEN METABOLIC PANEL: CPT | Performed by: EMERGENCY MEDICINE

## 2025-03-23 PROCEDURE — 25000003 PHARM REV CODE 250: Performed by: EMERGENCY MEDICINE

## 2025-03-23 PROCEDURE — 82272 OCCULT BLD FECES 1-3 TESTS: CPT | Performed by: EMERGENCY MEDICINE

## 2025-03-23 PROCEDURE — 96361 HYDRATE IV INFUSION ADD-ON: CPT

## 2025-03-23 PROCEDURE — 81003 URINALYSIS AUTO W/O SCOPE: CPT | Performed by: EMERGENCY MEDICINE

## 2025-03-23 PROCEDURE — 96374 THER/PROPH/DIAG INJ IV PUSH: CPT | Mod: 59

## 2025-03-23 PROCEDURE — 36415 COLL VENOUS BLD VENIPUNCTURE: CPT | Performed by: EMERGENCY MEDICINE

## 2025-03-23 PROCEDURE — 25500020 PHARM REV CODE 255: Performed by: EMERGENCY MEDICINE

## 2025-03-23 PROCEDURE — 99285 EMERGENCY DEPT VISIT HI MDM: CPT | Mod: 25

## 2025-03-23 PROCEDURE — 25000003 PHARM REV CODE 250: Performed by: SPECIALIST

## 2025-03-23 PROCEDURE — 83735 ASSAY OF MAGNESIUM: CPT | Performed by: EMERGENCY MEDICINE

## 2025-03-23 RX ORDER — MEGESTROL ACETATE 40 MG/ML
SUSPENSION ORAL
COMMUNITY
Start: 2025-02-21

## 2025-03-23 RX ORDER — HYDROCODONE BITARTRATE AND ACETAMINOPHEN 5; 325 MG/1; MG/1
1-2 TABLET ORAL EVERY 6 HOURS PRN
COMMUNITY

## 2025-03-23 RX ORDER — CABOZANTINIB 20 MG/1
1 TABLET ORAL
COMMUNITY
Start: 2025-03-19

## 2025-03-23 RX ORDER — DEXAMETHASONE 4 MG/1
4-8 TABLET ORAL
COMMUNITY
Start: 2025-02-12

## 2025-03-23 RX ORDER — IOPAMIDOL 755 MG/ML
100 INJECTION, SOLUTION INTRAVASCULAR
Status: COMPLETED | OUTPATIENT
Start: 2025-03-23 | End: 2025-03-23

## 2025-03-23 RX ORDER — ALPRAZOLAM 1 MG/1
1 TABLET ORAL NIGHTLY
COMMUNITY

## 2025-03-23 RX ORDER — DIATRIZOATE MEGLUMINE AND DIATRIZOATE SODIUM 660; 100 MG/ML; MG/ML
30 SOLUTION ORAL; RECTAL
Status: COMPLETED | OUTPATIENT
Start: 2025-03-23 | End: 2025-03-23

## 2025-03-23 RX ORDER — FENTANYL 25 UG/1
1 PATCH TRANSDERMAL
COMMUNITY
Start: 2025-02-24

## 2025-03-23 RX ORDER — CEFTRIAXONE 1 G/1
1 INJECTION, POWDER, FOR SOLUTION INTRAMUSCULAR; INTRAVENOUS
Status: COMPLETED | OUTPATIENT
Start: 2025-03-23 | End: 2025-03-23

## 2025-03-23 RX ORDER — FENTANYL 25 UG/1
1 PATCH TRANSDERMAL
COMMUNITY

## 2025-03-23 RX ORDER — SODIUM CHLORIDE 9 MG/ML
1000 INJECTION, SOLUTION INTRAVENOUS
Status: COMPLETED | OUTPATIENT
Start: 2025-03-23 | End: 2025-03-23

## 2025-03-23 RX ORDER — CABOZANTINIB 20 MG/1
1 TABLET ORAL
COMMUNITY
Start: 2025-01-18

## 2025-03-23 RX ADMIN — PIPERACILLIN SODIUM AND TAZOBACTAM SODIUM 4.5 G: 4; .5 INJECTION, POWDER, LYOPHILIZED, FOR SOLUTION INTRAVENOUS at 09:03

## 2025-03-23 RX ADMIN — DIATRIZOATE MEGLUMINE AND DIATRIZOATE SODIUM 30 ML: 600; 100 SOLUTION ORAL; RECTAL at 07:03

## 2025-03-23 RX ADMIN — VANCOMYCIN HYDROCHLORIDE 1000 MG: 1 INJECTION, POWDER, LYOPHILIZED, FOR SOLUTION INTRAVENOUS at 11:03

## 2025-03-23 RX ADMIN — IOPAMIDOL 80 ML: 755 INJECTION, SOLUTION INTRAVENOUS at 08:03

## 2025-03-23 RX ADMIN — CEFTRIAXONE 1 G: 1 INJECTION, POWDER, FOR SOLUTION INTRAMUSCULAR; INTRAVENOUS at 07:03

## 2025-03-23 RX ADMIN — SODIUM CHLORIDE 1000 ML: 9 INJECTION, SOLUTION INTRAVENOUS at 09:03

## 2025-03-23 RX ADMIN — SODIUM CHLORIDE 1000 ML: 9 INJECTION, SOLUTION INTRAVENOUS at 06:03

## 2025-03-23 NOTE — ED TRIAGE NOTES
Pt to er vi wheelchair with c/o nausea and vomiting off and on since yesterday and constipation -  states he used a fleets this am and pt states she had a watery bm - pt and  concerned about having  a blockage- pt has fentanyl patch in use due to metasatic cancer- pt receiving radiation treatments in [place] - pt states she fell into the bathroom this morning due to weakness - denies loc or injury

## 2025-03-23 NOTE — ED PROVIDER NOTES
Encounter Date: 3/23/2025  Patient accepted to [facility] in [place]  Patient is stable for transfer.         History     Chief Complaint   Patient presents with    Abdominal Pain    Nausea    Vomiting    Constipation     Patient presents with report of nausea and vomiting with loose stool as well since yesterday, after taking some milk of magnesia.  Patient states she has chronic constipation, felt like she had been constipated for 3 days so she took some milk of magnesia yesterday.  She did not ever pass any hard stool like she usually does.  Patient has a history of renal cell carcinoma on the left with metastatic disease for which she has been seeing Oncology and has been undergoing treatment over the past 9 months.      Review of patient's allergies indicates:  No Known Allergies  Past Medical History:   Diagnosis Date    COPD (chronic obstructive pulmonary disease)     Coronary artery disease     Fibromyalgia     History of non-ST elevation myocardial infarction (NSTEMI)     4/2019      Hyperlipidemia     Hypertension      Past Surgical History:   Procedure Laterality Date    CARDIAC CATHETERIZATION      HERNIA REPAIR      HYSTERECTOMY      OOPHORECTOMY       Family History   Problem Relation Name Age of Onset    Heart attack Father      Heart disease Father       Social History[1]  Review of Systems   Constitutional:  Positive for appetite change (poor appetite in general, has had a further decrease in appetite over the past 1-2 days.) and fatigue. Negative for activity change, chills, diaphoresis, fever and unexpected weight change.   HENT: Negative.     Eyes: Negative.    Respiratory: Negative.  Negative for shortness of breath.    Cardiovascular: Negative.  Negative for chest pain, palpitations and leg swelling.   Gastrointestinal:  Positive for abdominal pain (reports she had crampy abdominal pain yesterday and earlier today which has mostly resolved.  Pain was diffuse.), constipation (reports  constipation on a chronic basis and worse for the past 3 days, took milk of magnesia yesterday and now has diarrhea), diarrhea, nausea and vomiting. Negative for abdominal distention and blood in stool.   Genitourinary:  Positive for decreased urine volume.   Musculoskeletal: Negative.    Skin: Negative.    Neurological:  Positive for weakness (reports generalized weakness, no focal deficits.). Negative for dizziness, tremors, seizures, syncope, facial asymmetry, speech difficulty, light-headedness, numbness and headaches.   All other systems reviewed and are negative.      Physical Exam     Initial Vitals [03/23/25 1803]   BP Pulse Resp Temp SpO2   (!) 122/92 (!) 114 (!) 21 97.7 °F (36.5 °C) 95 %      MAP       --         Physical Exam    Nursing note and vitals reviewed.  Constitutional: She is not diaphoretic. She appears cachectic. She is cooperative.  Non-toxic appearance. She does not have a sickly appearance. She appears ill (patient appears chronically ill). No distress.   HENT:   Right Ear: External ear normal.   Left Ear: External ear normal.   Nose: Nose normal. Mouth/Throat: Mucous membranes are dry.   Eyes: Conjunctivae and EOM are normal. Pupils are equal, round, and reactive to light. No scleral icterus.   Neck: Neck supple. No JVD present.   Normal range of motion.  Cardiovascular:  Regular rhythm, normal heart sounds and intact distal pulses.   Tachycardia present.         No murmur heard.  Pulmonary/Chest: Breath sounds normal. No stridor. No respiratory distress. She has no wheezes. She has no rhonchi. She has no rales.   Abdominal: Bowel sounds are normal. She exhibits no distension. There is abdominal tenderness (patient has diffuse abdominal tenderness, abdomen is somewhat diffusely firm to touch.). There is guarding (patient has some voluntary guarding with palpation ofhe abdomen.). There is no rebound.   Genitourinary:    Genitourinary Comments: Nurse Estephania Ambrocio RN, present for examination.   Rectal examination shows no palpable stool in the rectal vault, no blood seen.  No mass palpated.  Small amount of tan liquid on glove was checked for occult blood.     Musculoskeletal:         General: No tenderness or edema. Normal range of motion.      Cervical back: Normal range of motion and neck supple.     Lymphadenopathy:     She has no cervical adenopathy.   Neurological: She is alert and oriented to person, place, and time. No cranial nerve deficit. GCS score is 15. GCS eye subscore is 4. GCS verbal subscore is 5. GCS motor subscore is 6.   Patient has generalized weakness, no focal deficits.   Skin: Skin is warm and dry. Capillary refill takes 2 to 3 seconds. No rash noted. No erythema. There is pallor.   Psychiatric: She has a normal mood and affect. Her behavior is normal.         Medical Screening Exam   See Full Note    ED Course   Procedures  Labs Reviewed   COMPREHENSIVE METABOLIC PANEL - Abnormal       Result Value    Sodium 136      Potassium 3.9      Chloride 82 (*)     CO2 35 (*)     Anion Gap 23 (*)     Glucose 198 (*)     BUN 35 (*)     Creatinine 1.35 (*)     BUN/Creatinine Ratio 26 (*)     Calcium 10.5 (*)     Total Protein 8.5 (*)     Albumin 3.0 (*)     Globulin 5.5 (*)     A/G Ratio 0.5      Bilirubin, Total 1.0      Alk Phos 130      ALT 36      AST 30      eGFR 42 (*)    MAGNESIUM - Abnormal    Magnesium 3.3 (*)    URINALYSIS, REFLEX TO URINE CULTURE - Abnormal    Color, UA Dark Yellow      Clarity, UA Cloudy      pH, UA 7.5      Leukocytes, UA Small (*)     Nitrites, UA Negative      Protein,  (*)     Glucose, UA Negative      Ketones, UA Negative      Urobilinogen, UA 0.2      Bilirubin, UA Small (*)     Blood, UA Trace-Intact (*)     Specific Gravity, UA 1.020     CBC WITH DIFFERENTIAL - Abnormal    WBC 5.86      RBC 3.95 (*)     Hemoglobin 13.5      Hematocrit 38.3      MCV 97.0 (*)     MCH 34.2 (*)     MCHC 35.2      RDW 15.8 (*)     Platelet Count 236      MPV 8.6 (*)      Neutrophils % 90.5 (*)     Lymphocytes % 6.3 (*)     Monocytes % 2.2      Eosinophils % 0.0 (*)     Basophils % 0.5      Immature Granulocytes % 0.5 (*)     nRBC, Auto 0.0      Neutrophils, Abs 5.30      Lymphocytes, Absolute 0.37 (*)     Monocytes, Absolute 0.13      Eosinophils, Absolute 0.00      Basophils, Absolute 0.03      Immature Granulocytes, Absolute 0.03      nRBC, Absolute 0.00      Diff Type Manual     MANUAL DIFFERENTIAL - Abnormal    Segmented Neutrophils, Man % 67 (*)     Bands, Man % 25 (*)     Lymphocytes, Man % 6 (*)     Monocytes, Man % 2      Platelet Morphology Normal      Anisocytosis 1+     URINALYSIS, MICROSCOPIC - Abnormal    WBC, UA Too Numerous To Count (*)     RBC, UA 3-5 (*)     Bacteria, UA Many (*)     Squamous Epithelial Cells, UA Moderate (*)    LIPASE - Normal    Lipase 5     OCCULT BLOOD X 1, STOOL - Normal    Occult Blood Negative     CULTURE, URINE   CBC W/ AUTO DIFFERENTIAL    Narrative:     The following orders were created for panel order CBC auto differential.  Procedure                               Abnormality         Status                     ---------                               -----------         ------                     CBC with Differential[[id number]]       Abnormal            Final result               Manual Differential[[id number]]         Abnormal            Final result                 Please view results for these tests on the individual orders.          Imaging Results               CT Abdomen Pelvis With IV Contrast Routine Oral Contrast (Final result)  Result time 03/23/25 21:17:33      Final result by Lex Jordan MD (03/23/25 21:17:33)                   Impression:      1. Findings concerning for mechanical small bowel obstruction with suspected transition point seen in the midline mid to upper pelvis, as further discussed in the body of the report.  2. Intraperitoneal free air, in the absence of any recent procedure/surgery, is highly concerning  for perforated hollow viscus.  3. Mild colonic diverticulosis.  No convincing evidence for focal diverticulitis.  Of note, there is small collection of non organized fluid with small gas focus within the pelvis near sigmoid colon diverticulum, potential source for the intraperitoneal free air.  4. Patient has history of known left renal cell carcinoma.  Left renal masses are slightly smaller from 09/06/2024 CT study.  5. Grossly stable other findings in the body of the report.  This report was flagged in Epic as abnormal.    COMMUNICATION  This critical result was discovered/received at 20:55 hours.  The critical information above was relayed directly by me by telephone to Dr. Herr in the emergency department on 03/23/2025 at 21:00 hours.      Electronically signed by: Lex Jordan MD  Date:    03/23/2025  Time:    21:17               Narrative:    EXAMINATION:  CT ABDOMEN PELVIS WITH IV CONTRAST    CLINICAL HISTORY:  Nausea/vomiting;Bowel obstruction suspected;Abdominal pain, acute, nonlocalized;    TECHNIQUE:  Low dose axial images, sagittal and coronal reformations were obtained from the lung bases to the pubic symphysis following the IV administration of 80 mL of Isovue 370 and the oral administration of 30 mL of Gastroview.  3 minute delayed images were also obtained through the abdomen and pelvis.    COMPARISON:  CT chest, abdomen and pelvis 09/06/2024    FINDINGS:  Imaged lung bases show partially imaged 1 cm calcified granuloma in the left lower lobe, scattered areas of mild platelike scarring versus atelectasis and mild patchy ground-glass and airspace opacities in the lower lobes.  No pleural effusion.    Scattered non dependent intraperitoneal free air overall small volume and mostly collected at the anterior upper abdomen beneath the diaphragm and along the liver.  Few scattered intraperitoneal gas foci within the non dependent left hemiabdomen and also left pelvis.  Small volume of poorly organized  fluid with small gas focus within the left aspect of the pelvis which abuts small portion of the sigmoid colon, may reflect source for the intraperitoneal free air.  Scattered colonic diverticula.  No convincing evidence for focal diverticulitis.    Multiple moderate to markedly dilated small bowel loops with scattered air-fluid levels throughout the abdomen and pelvis with suspected transition point seen in the midline mid to upper pelvis.  Distal small bowel loops distal to the transition point are relatively decompressed.  Terminal ileum is decompressed.  Appendix not localized; however, no pericecal inflammatory change.  No bowel pneumatosis or portal venous gas.  Stomach is moderately distended with ingested contents without wall thickening or adjacent inflammatory change.  Duodenum is dilated without wall thickening or adjacent inflammatory change.    Portal vasculature is patent.  Liver, gallbladder and bilateral adrenal glands are within normal limits.  Spleen is normal in size noting multiple scattered parenchymal calcifications consistent with prior granulomatous disease.  Pancreas mildly atrophic without discrete mass or adjacent inflammatory change definitively seen.  No significant biliary ductal dilatation.    Bilateral kidneys are normal in overall size and location enhancement on the right.  No hydronephrosis or significant perinephric stranding.  Heterogeneously enhancing mass is again seen at the left renal upper pole measuring approximately 3.7 x 4.3 cm, previously 4.4 x 5.2 cm.  Additional heterogeneous hypodense partially exophytic mass arising from the left renal lower pole measuring up to 2.2 x 2 cm which previously measured up to 2.6 cm.  Ureters are normal in course and caliber.  Urinary bladder is well distended without wall thickening or adjacent inflammatory change.  Uterus not identified and likely surgically absent or atrophic.  No adnexal mass or significant volume free pelvic  fluid.    No upper abdominal ascites.  No lymphadenopathy by CT criteria.    Scattered calcified and noncalcified atherosclerotic plaque of the abdominal aorta extending into its iliac branches.  Redemonstrated infrarenal fusiform abdominal aortic aneurysm there is 3.1 x 3.3 cm in maximum transaxial dimension, not significantly changed from prior allowing for differences in interobserver measurement variability.    Extraperitoneal soft tissues and osseous structures appear stable without acute findings.                                       Medications   vancomycin (VANCOCIN) 1,000 mg in D5W 250 mL IVPB (admixture device) (1,000 mg Intravenous New Bag 3/23/25 2300)   sodium chloride 0.9% bolus 1,000 mL 1,000 mL (0 mLs Intravenous Stopped 3/23/25 1938)   cefTRIAXone injection 1 g (1 g Intravenous Given 3/23/25 1943)   iopamidoL (ISOVUE-370) injection 100 mL (80 mLs Intravenous Given 3/23/25 2033)   diatrizoate meglumineand-diatrizoate sodium (GASTROVIEW) solution 30 mL (30 mLs Oral Given 3/23/25 1946)   piperacillin-tazobactam (ZOSYN) 4.5 g in D5W 100 mL IVPB (MB+) (0 g Intravenous Stopped 3/23/25 2233)   0.9% NaCl infusion (1,000 mLs Intravenous New Bag 3/23/25 2149)     Medical Decision Making  Differential diagnosis includes bowel obstruction, constipation, gastroenteritis, volume depletion, electrolyte abnormality, known left renal cell carcinoma with metastatic disease.    Patient states she just wants to get some IV fluid and go home.  Patient was given IV normal saline, lab and CT scan ordered.    Amount and/or Complexity of Data Reviewed  Labs: ordered. Decision-making details documented in ED Course.  Radiology: ordered. Decision-making details documented in ED Course.  Discussion of management or test interpretation with external provider(s): Patient care transferred to Dr. Herr at change of shift, lab and x-ray pending.  Report given.    Risk  Prescription drug management.                                       Clinical Impression:   Final diagnoses:  [R10.84] Generalized abdominal pain (Primary)  [K66.8] Free intraperitoneal air  [K56.609] Small bowel obstruction        ED Disposition Condition    Transfer to Another Facility Stable                    [1]   Social History  Tobacco Use    Smoking status: Former     Current packs/day: 0.00     Average packs/day: 1 pack/day for 50.0 years (50.0 ttl pk-yrs)     Types: Cigarettes     Start date: 1969     Quit date: 2019     Years since quittin.9    Smokeless tobacco: Never   Substance Use Topics    Alcohol use: Never    Drug use: Never        Geno Herr MD  [phone]

## 2025-03-24 VITALS
BODY MASS INDEX: 14.14 KG/M2 | SYSTOLIC BLOOD PRESSURE: 127 MMHG | WEIGHT: 88 LBS | HEIGHT: 66 IN | RESPIRATION RATE: 18 BRPM | HEART RATE: 104 BPM | DIASTOLIC BLOOD PRESSURE: 85 MMHG | TEMPERATURE: 98 F | OXYGEN SATURATION: 94 %

## 2025-03-24 NOTE — ED NOTES
Called [facility] to give transfer report to # [phone] maria for 18 min and called back, at 0140, report given to ISHAAN Melgar

## 2025-03-24 NOTE — ED NOTES
[facility] arrived to transport patient for transfer to [facility] in [place], report and transfer packet given to Sarah Montejo

## 2025-03-24 NOTE — ED NOTES
Rec'd call from Fabrizio with the [facility]. Pt going to [facility]. Patient room assignment Tele [id number]. Report to be called to [phone].

## 2025-03-24 NOTE — ED NOTES
Dr Herr on the phone with physican at [facility]  per [facility], pt accepted  in conversation

## 2025-03-24 NOTE — ED NOTES
Went to get patient to sign EMTALA form, pt requested to be transported via helicopter. Called [facility] spoke to Evangelista. Declined transport due to inclement weather.

## 2025-03-26 ENCOUNTER — RESULTS FOLLOW-UP (OUTPATIENT)
Dept: EMERGENCY MEDICINE | Facility: HOSPITAL | Age: 70
End: 2025-03-26

## 2025-03-26 LAB — UA COMPLETE W REFLEX CULTURE PNL UR: ABNORMAL

## 2025-04-21 DIAGNOSIS — G47.09 OTHER INSOMNIA: ICD-10-CM

## 2025-04-22 RX ORDER — MIRTAZAPINE 45 MG/1
45 TABLET, FILM COATED ORAL NIGHTLY
Qty: 90 TABLET | Refills: 3 | Status: SHIPPED | OUTPATIENT
Start: 2025-04-22

## 2025-05-18 ENCOUNTER — PATIENT MESSAGE (OUTPATIENT)
Dept: FAMILY MEDICINE | Facility: CLINIC | Age: 70
End: 2025-05-18
Payer: MEDICARE

## 2025-05-22 ENCOUNTER — OFFICE VISIT (OUTPATIENT)
Dept: PRIMARY CARE CLINIC | Facility: CLINIC | Age: 70
End: 2025-05-22
Payer: MEDICARE

## 2025-05-22 VITALS
WEIGHT: 83.63 LBS | BODY MASS INDEX: 13.44 KG/M2 | OXYGEN SATURATION: 97 % | TEMPERATURE: 98 F | RESPIRATION RATE: 20 BRPM | HEART RATE: 81 BPM | DIASTOLIC BLOOD PRESSURE: 77 MMHG | HEIGHT: 66 IN | SYSTOLIC BLOOD PRESSURE: 125 MMHG

## 2025-05-22 DIAGNOSIS — R64 CACHEXIA: ICD-10-CM

## 2025-05-22 DIAGNOSIS — L89.92 PRESSURE INJURY, STAGE 2, UNSPECIFIED LOCATION: ICD-10-CM

## 2025-05-22 DIAGNOSIS — I10 PRIMARY HYPERTENSION: Primary | ICD-10-CM

## 2025-05-22 DIAGNOSIS — L89.104 PRESSURE INJURY OF BACK, STAGE 4: ICD-10-CM

## 2025-05-22 PROCEDURE — 99214 OFFICE O/P EST MOD 30 MIN: CPT | Mod: ,,, | Performed by: NURSE PRACTITIONER

## 2025-05-22 PROCEDURE — 80053 COMPREHEN METABOLIC PANEL: CPT | Mod: ,,, | Performed by: CLINICAL MEDICAL LABORATORY

## 2025-05-22 PROCEDURE — 85025 COMPLETE CBC W/AUTO DIFF WBC: CPT | Mod: ,,, | Performed by: CLINICAL MEDICAL LABORATORY

## 2025-05-22 PROCEDURE — 80061 LIPID PANEL: CPT | Mod: ,,, | Performed by: CLINICAL MEDICAL LABORATORY

## 2025-05-22 PROCEDURE — 87070 CULTURE OTHR SPECIMN AEROBIC: CPT | Mod: ,,, | Performed by: CLINICAL MEDICAL LABORATORY

## 2025-05-22 RX ORDER — CEPHALEXIN 500 MG/1
500 CAPSULE ORAL EVERY 12 HOURS
Qty: 20 CAPSULE | Refills: 0 | Status: SHIPPED | OUTPATIENT
Start: 2025-05-22 | End: 2025-06-01

## 2025-05-22 NOTE — PROGRESS NOTES
[facility] [address] [place] [address]  Ph: [phone]   Alexsander Johnson DNP, FNP-C  Primary Care       PATIENT NAME: Lorraine Brambila   : 1955    AGE: 69 y.o. DATE: 2025    MRN: [id number]        Reason for Visit / Chief Complaint:  other (Pt needs assistance for  bed sores  on her back. And concerns of possible home health assistance.)     Subjective:     HPI: Patient states she was hospitalized at [facility] in  for bowel obstruction. States she stayed in the hospital for about one week. Patient states she has a bed sore to her back.     Patient has secondary neoplasm of bone and renal carcinoma. Sees oncology at [facility] in [place], [place].            Review of Systems: Review of Systems   Constitutional:  Negative for chills, fatigue and fever.   Respiratory:  Negative for cough, chest tightness and shortness of breath.    Cardiovascular:  Negative for chest pain.   Gastrointestinal:  Negative for abdominal pain, constipation, diarrhea, nausea and vomiting.   Genitourinary:  Negative for dysuria.   Musculoskeletal:  Negative for gait problem.   Skin:  Positive for wound. Negative for rash.   Neurological:  Negative for headaches.          Review of patient's allergies indicates:  No Known Allergies     Med List:  Medications Ordered Prior to Encounter[1]    Medical/Social/Family History:  Past Medical History:   Diagnosis Date    COPD (chronic obstructive pulmonary disease)     Coronary artery disease     Fibromyalgia     History of non-ST elevation myocardial infarction (NSTEMI)     2019      Hyperlipidemia     Hypertension       Tobacco Use History[2]   Social History     Substance and Sexual Activity   Alcohol Use Never       Family History   Problem Relation Name Age of Onset    Heart attack Father      Heart disease Father        Past Surgical History:   Procedure Laterality Date    CARDIAC CATHETERIZATION      HERNIA  "REPAIR      HYSTERECTOMY      OOPHORECTOMY          There is no immunization history on file for this patient.       Objective:      Vitals:    05/22/25 1529   BP: 125/77   BP Location: Left arm   Patient Position: Sitting   Pulse: 81   Resp: 20   Temp: 97.8 °F (36.6 °C)   TempSrc: Oral   SpO2: 97%   Weight: 37.9 kg (83 lb 9.6 oz)   Height: 5' 6" (1.676 m)     Body mass index is 13.49 kg/m².     Physical Exam: Physical Exam  Constitutional:       General: She is not in acute distress.     Comments: Cachexia       HENT:      Nose: Nose normal.   Eyes:      Extraocular Movements: Extraocular movements intact.      Conjunctiva/sclera: Conjunctivae normal.      Pupils: Pupils are equal, round, and reactive to light.   Cardiovascular:      Heart sounds: Normal heart sounds.   Pulmonary:      Breath sounds: Normal breath sounds.   Abdominal:      Palpations: Abdomen is soft.   Musculoskeletal:      Cervical back: Normal range of motion and neck supple.   Skin:     General: Skin is warm and dry.      Capillary Refill: Capillary refill takes less than 2 seconds.      Coloration: Skin is pale.      Findings: Wound present.             Comments: Patient has 2 in x 0.7 in open, stage 4 wound to mid upper back; bone appears to be exposed to top of wound; wound bed has redness and yellow slough.    Small stage 2 pressure area to mid lower back with yellow slough noted.    Neurological:      General: No focal deficit present.   Psychiatric:         Behavior: Behavior normal.                Assessment:          ICD-10-CM ICD-9-CM   1. Primary hypertension  I10 401.9   2. Pressure injury of back, stage 4  L89.104 707.09     707.24   3. Pressure injury, stage 2, unspecified location  L89.92 707.00     707.22   4. Cachexia  R64 799.4        Plan:       Primary hypertension  -     Ambulatory referral/consult to Wound Clinic; Future; Expected date: 05/29/2025  -     CBC Auto Differential; Future; Expected date: 05/22/2025  -     " Comprehensive Metabolic Panel; Future; Expected date: 2025  -     Lipid Panel; Future; Expected date: 2025    Pressure injury of back, stage 4  -     Ambulatory referral/consult to Wound Clinic; Future; Expected date: 2025  -     Culture, Wound  -     cephALEXin (KEFLEX) 500 MG capsule; Take 1 capsule (500 mg total) by mouth every 12 (twelve) hours. for 10 days  Dispense: 20 capsule; Refill: 0    Pressure injury, stage 2, unspecified location  -     Ambulatory referral/consult to Wound Clinic; Future; Expected date: 2025  -     Culture, Wound  -     cephALEXin (KEFLEX) 500 MG capsule; Take 1 capsule (500 mg total) by mouth every 12 (twelve) hours. for 10 days  Dispense: 20 capsule; Refill: 0    Cachexia  Has megace    Currently cleaning wound with wound , applying hypogel, covering with dressing.     New & refilled meds:  Requested Prescriptions     Signed Prescriptions Disp Refills    cephALEXin (KEFLEX) 500 MG capsule 20 capsule 0     Sig: Take 1 capsule (500 mg total) by mouth every 12 (twelve) hours. for 10 days       Follow up if symptoms worsen or fail to improve.     There are no Patient Instructions on file for this visit.         Signature: Alexsander Johnson DNP, FNP-C         [1]   Current Outpatient Medications on File Prior to Visit   Medication Sig Dispense Refill    ALPRAZolam (XANAX) 1 MG tablet Take 1 mg by mouth every evening.      CABOMETYX 20 mg Tab Take 1 tablet by mouth.      clopidogreL (PLAVIX) 75 mg tablet Take 1 tablet (75 mg total) by mouth once daily. 90 tablet 2    dexAMETHasone (DECADRON) 4 MG Tab Take 4-8 mg by mouth.      fentaNYL (DURAGESIC) 25 mcg/hr Place 1 patch onto the skin every 72 hours.      HYDROcodone-acetaminophen (NORCO) 5-325 mg per tablet Take 1-2 tablets by mouth every 6 (six) hours as needed.      megestroL (MEGACE) 400 mg/10 mL (40 mg/mL) Susp SMARTSI teaspoon By Mouth Daily      metoprolol succinate (TOPROL-XL) 50 MG 24 hr tablet Take  1 tablet (50 mg total) by mouth once daily. 90 tablet 1    mirtazapine (REMERON) 45 MG tablet TAKE 1 TABLET EVERY EVENING 90 tablet 3    ondansetron (ZOFRAN) 4 MG tablet Take 4 mg by mouth every 8 (eight) hours as needed for Nausea.      pantoprazole (PROTONIX) 40 MG tablet       atorvastatin (LIPITOR) 40 MG tablet TAKE 1 TABLET EVERY EVENING (Patient not taking: Reported on 2025) 90 tablet 3    CABOMETYX 20 mg Tab Take 1 tablet by mouth. (Patient not taking: Reported on 2025)      fentaNYL (DURAGESIC) 25 mcg/hr Place 1 patch onto the skin. (Patient not taking: Reported on 2025)      gabapentin (NEURONTIN) 100 MG capsule Take 1 capsule (100 mg total) by mouth 3 (three) times daily. (Patient not taking: Reported on 2025) 90 capsule 2    nitroGLYCERIN (NITROSTAT) 0.4 MG SL tablet Place 1 tablet (0.4 mg total) under the tongue every 5 (five) minutes as needed for Chest pain. 90 tablet 3     No current facility-administered medications on file prior to visit.   [2]   Social History  Tobacco Use   Smoking Status Former    Current packs/day: 0.00    Average packs/day: 1 pack/day for 50.0 years (50.0 ttl pk-yrs)    Types: Cigarettes    Start date: 1969    Quit date: 2019    Years since quittin.1   Smokeless Tobacco Never

## 2025-05-23 LAB
ALBUMIN SERPL BCP-MCNC: 3.7 G/DL (ref 3.4–4.8)
ALBUMIN/GLOB SERPL: 0.8 {RATIO}
ALP SERPL-CCNC: 112 U/L (ref 40–150)
ALT SERPL W P-5'-P-CCNC: 28 U/L
ANION GAP SERPL CALCULATED.3IONS-SCNC: 18 MMOL/L (ref 7–16)
ANISOCYTOSIS BLD QL SMEAR: ABNORMAL
AST SERPL W P-5'-P-CCNC: 30 U/L (ref 11–45)
BASOPHILS # BLD AUTO: 0.01 K/UL (ref 0–0.2)
BASOPHILS NFR BLD AUTO: 0.1 % (ref 0–1)
BILIRUB SERPL-MCNC: 0.3 MG/DL
BUN SERPL-MCNC: 14 MG/DL (ref 10–20)
BUN/CREAT SERPL: 20 (ref 6–20)
CALCIUM SERPL-MCNC: 10.4 MG/DL (ref 8.4–10.2)
CHLORIDE SERPL-SCNC: 95 MMOL/L (ref 98–107)
CHOLEST SERPL-MCNC: 320 MG/DL
CHOLEST/HDLC SERPL: 3.2 {RATIO}
CO2 SERPL-SCNC: 29 MMOL/L (ref 23–31)
CREAT SERPL-MCNC: 0.71 MG/DL (ref 0.55–1.02)
CRENATED CELLS: ABNORMAL
DIFFERENTIAL METHOD BLD: ABNORMAL
EGFR (NO RACE VARIABLE) (RUSH/TITUS): 92 ML/MIN/1.73M2
EOSINOPHIL # BLD AUTO: 0 K/UL (ref 0–0.5)
EOSINOPHIL NFR BLD AUTO: 0 % (ref 1–4)
ERYTHROCYTE [DISTWIDTH] IN BLOOD BY AUTOMATED COUNT: 14.6 % (ref 11.5–14.5)
GLOBULIN SER-MCNC: 4.5 G/DL (ref 2–4)
GLUCOSE SERPL-MCNC: 82 MG/DL (ref 82–115)
HCT VFR BLD AUTO: 45.5 % (ref 38–47)
HDLC SERPL-MCNC: 101 MG/DL (ref 35–60)
HGB BLD-MCNC: 13.7 G/DL (ref 12–16)
IMM GRANULOCYTES # BLD AUTO: 0.02 K/UL (ref 0–0.04)
IMM GRANULOCYTES NFR BLD: 0.3 % (ref 0–0.4)
LDLC SERPL CALC-MCNC: 185 MG/DL
LDLC/HDLC SERPL: 1.8 {RATIO}
LYMPHOCYTES # BLD AUTO: 0.76 K/UL (ref 1–4.8)
LYMPHOCYTES NFR BLD AUTO: 11.1 % (ref 27–41)
MACROCYTES BLD QL SMEAR: ABNORMAL
MCH RBC QN AUTO: 34.8 PG (ref 27–31)
MCHC RBC AUTO-ENTMCNC: 30.1 G/DL (ref 32–36)
MCV RBC AUTO: 115.5 FL (ref 80–96)
MONOCYTES # BLD AUTO: 0.15 K/UL (ref 0–0.8)
MONOCYTES NFR BLD AUTO: 2.2 % (ref 2–6)
MPC BLD CALC-MCNC: 9 FL (ref 9.4–12.4)
NEUTROPHILS # BLD AUTO: 5.9 K/UL (ref 1.8–7.7)
NEUTROPHILS NFR BLD AUTO: 86.3 % (ref 53–65)
NONHDLC SERPL-MCNC: 219 MG/DL
NRBC # BLD AUTO: 0.02 X10E3/UL
NRBC, AUTO (.00): 0.3 %
PLATELET # BLD AUTO: 322 K/UL (ref 150–400)
PLATELET MORPHOLOGY: ABNORMAL
POTASSIUM SERPL-SCNC: 4.7 MMOL/L (ref 3.5–5.1)
PROT SERPL-MCNC: 8.2 G/DL (ref 5.8–7.6)
RBC # BLD AUTO: 3.94 M/UL (ref 4.2–5.4)
SODIUM SERPL-SCNC: 137 MMOL/L (ref 136–145)
TRIGL SERPL-MCNC: 168 MG/DL (ref 37–140)
VLDLC SERPL-MCNC: 34 MG/DL
WBC # BLD AUTO: 6.84 K/UL (ref 4.5–11)

## 2025-05-24 LAB
MICROORGANISM SPEC CULT: ABNORMAL

## 2025-05-27 ENCOUNTER — RESULTS FOLLOW-UP (OUTPATIENT)
Dept: PRIMARY CARE CLINIC | Facility: CLINIC | Age: 70
End: 2025-05-27

## 2025-05-27 ENCOUNTER — TELEPHONE (OUTPATIENT)
Dept: PRIMARY CARE CLINIC | Facility: CLINIC | Age: 70
End: 2025-05-27
Payer: MEDICARE

## 2025-05-27 DIAGNOSIS — L08.9 WOUND INFECTION: Primary | ICD-10-CM

## 2025-05-27 DIAGNOSIS — T14.8XXA WOUND INFECTION: Primary | ICD-10-CM

## 2025-05-27 RX ORDER — MUPIROCIN 20 MG/G
OINTMENT TOPICAL EVERY OTHER DAY
Qty: 30 G | Refills: 1 | Status: SHIPPED | OUTPATIENT
Start: 2025-05-27

## 2025-05-27 RX ORDER — GENTAMICIN SULFATE 1 MG/G
OINTMENT TOPICAL EVERY OTHER DAY
Qty: 30 G | Refills: 1 | Status: SHIPPED | OUTPATIENT
Start: 2025-05-27

## 2025-05-27 NOTE — TELEPHONE ENCOUNTER
----- Message from Alexsander Johnson DNP, FNP-C sent at 5/27/2025 11:52 AM CDT -----  Please notify of results.     Discussed wound infection and plan of care with Dr. Orellana; she recommends to alternate gentamicin ointment and bactroban every other day to upper back wound; bactroban daily to lower back wound.     Ask patient if she is still taking the lipitor. Cholesterol is elevated.   ----- Message -----  From: Lab, Background User  Sent: 5/23/2025   4:39 PM CDT  To: Alexsander Johnson DNP, KATIEC

## 2025-05-28 ENCOUNTER — OFFICE VISIT (OUTPATIENT)
Dept: WOUND CARE | Facility: HOSPITAL | Age: 70
End: 2025-05-28
Attending: NURSE PRACTITIONER
Payer: MEDICARE

## 2025-05-28 VITALS
DIASTOLIC BLOOD PRESSURE: 73 MMHG | HEART RATE: 82 BPM | TEMPERATURE: 98 F | SYSTOLIC BLOOD PRESSURE: 116 MMHG | OXYGEN SATURATION: 97 % | RESPIRATION RATE: 19 BRPM

## 2025-05-28 DIAGNOSIS — L89.104 DECUBITUS ULCER OF BACK, STAGE 4: ICD-10-CM

## 2025-05-28 DIAGNOSIS — Z51.89 VISIT FOR WOUND CHECK: Primary | ICD-10-CM

## 2025-05-28 DIAGNOSIS — L89.100 PRESSURE INJURY OF BACK, UNSTAGEABLE: ICD-10-CM

## 2025-05-28 DIAGNOSIS — A49.8 INFECTION DUE TO MULTIDRUG-RESISTANT PSEUDOMONAS AERUGINOSA: ICD-10-CM

## 2025-05-28 DIAGNOSIS — A49.01 STAPH AUREUS INFECTION: ICD-10-CM

## 2025-05-28 DIAGNOSIS — Z16.24 INFECTION DUE TO MULTIDRUG-RESISTANT PSEUDOMONAS AERUGINOSA: ICD-10-CM

## 2025-05-28 PROCEDURE — 11042 DBRDMT SUBQ TIS 1ST 20SQCM/<: CPT

## 2025-05-28 PROCEDURE — 99204 OFFICE O/P NEW MOD 45 MIN: CPT | Performed by: NURSE PRACTITIONER

## 2025-05-28 PROCEDURE — 11042 DBRDMT SUBQ TIS 1ST 20SQCM/<: CPT | Performed by: NURSE PRACTITIONER

## 2025-05-28 PROCEDURE — 99204 OFFICE O/P NEW MOD 45 MIN: CPT | Mod: 25

## 2025-05-28 NOTE — PATIENT INSTRUCTIONS
Clean upper mid back with vashe and gauze then apply hygrogel silver gel and  cover gel with a strip of Vaseline gauze then secure with lg bordered mediplex. The lower back wound clean with vashe and gauze apply hydrogel with silver gel cover with small mediplex.      Maintain nutrition and hydration, proper handwashing, and personal hygiene, follow wound care instructions provided, take antibiotic prescription as prescribed, reposition frequently to offload pressure    Hand Hygiene  Wash hands before and after wound care. Call the Wound Center at [phone] if you have signs or symptoms of infection, increased drainage, increasing odor, or unusual redness. After Wound Care hours, please notify your PCP or go to the ER.

## 2025-05-28 NOTE — ASSESSMENT & PLAN NOTE
Cefepime 1gm IV q 12 hours for 10 days. Faxed order to [facility] on 5/28/25. They will run her insurance to try and get it approved then proceed. [facility] will notify patient and home health once approval/ rejection returns.

## 2025-05-28 NOTE — TELEPHONE ENCOUNTER
----- Message from Alexsander Johnson DNP, FNP-C sent at 5/27/2025 11:52 AM CDT -----  Please notify of results.     Discussed wound infection and plan of care with Dr. Orellana; she recommends to alternate gentamicin ointment and bactroban every other day to upper back wound; bactroban daily to lower back wound.     Ask patient if she is still taking the lipitor. Cholesterol is elevated.   ----- Message -----  From: Lab, Background User  Sent: 5/23/2025   4:39 PM CDT  To: Alexsander Johnson DNP, KATIEC     No

## 2025-05-28 NOTE — PROCEDURES
"Debridement    Date/Time: 5/28/2025 1:30 PM    Performed by: Cande Nava FNP  Authorized by: Cande Nava FNP  Associated wounds:        Wound 05/28/25 1342 Pressure Injury medial Thoracic spine #1       Wound 05/28/25 1345 Pressure Injury lower;medial Lumbar spine #2    Time out: Immediately prior to procedure a "time out" was called to verify the correct patient, procedure, equipment, support staff and site/side marked as required.    Consent Done?:  Yes (Verbal) and Yes (Written)    Preparation: Patient was prepped and draped in usual sterile fashion    Local anesthesia used?: Yes    Local anesthetic:  Lidocaine 2% topical gel    Wound Details:    Location:  Back    Type of Debridement:  Excisional       Length (cm):  6       Width (cm):  1.5       Depth (cm):  0.5       Area (sq cm):  7.07       Percent Debrided (%):  30       Total Area Debrided (sq cm):  2.12    Depth of debridement:  Subcutaneous tissue    Tissue debrided:  Subcutaneous and Fascia (Epibole present to edges - scored then silver nitrate applied. Bone visualized but not debrided.)    Devitalized tissue debrided:  Exudate and Slough    Instruments:  Curette  Bleeding:  Minimal  Hemostasis Achieved: Yes  Method Used:  Silver Nitrate    Additional wounds:  1    2nd Wound Details:     Location:  Back (lumbar spine)    Type of Debridement:  Excisional       Length (cm):  1       Area (sq cm):  0.55       Width (cm):  0.7       Percent Debrided (%):  50       Depth (cm):  0.1       Total Area Debrided (sq cm):  0.28    Depth of debridement:  Subcutaneous tissue    Tissue debrided:  Subcutaneous    Devitalized tissue debrided:  Slough and Exudate    Instruments:  Curette  Bleeding:  Minimal  Method Used:  Pressure and Silver Nitrate  Patient tolerance:  Patient tolerated the procedure well with no immediate complications  1st Wound Pain Assessment: 2  2nd Wound Pain Assessment: 1  "

## 2025-05-28 NOTE — ASSESSMENT & PLAN NOTE
Clean wound to lumbar spine with vashe and apply hydrogel with silver then cover/ secure with foam border gauze. Wound Care to be done on Monday, Wednesday, and Friday.

## 2025-05-28 NOTE — ASSESSMENT & PLAN NOTE
Clean wound with vashe and apply hydrogel with silver, cover with adaptic (be sure it covers bone), then apply foam border gauze. Wound care to be done on Monday, Wednesday, and Friday and prn for soiling.    Home health consult to see patient 2x weekly and she will be seen once weekly at wound clinic    Nutrition  Supplement with daily multivitamin.  Nutritional supplement: Boost, Ensure, Tino    Hand Hygiene/Smoking Cessation  Wash hands before and after wound care. Call the Wound Center at [phone] if you have signs or symptoms of infection, increased drainage, increasing odor, or unusual redness. After Wound Care hours, please notify your PCP or go to the ER.     RTC 1 week

## 2025-05-28 NOTE — PROGRESS NOTES
Cande Nava, GIUSEPPE   [facility]  [address] [place] [address]  [phone]      PATIENT NAME: Lorraine Brambila  : 1955  DATE: 25  MRN: [id number]      Billing Provider: GIUSEPPE Robledo  Level of Service: [place] OFFICE/OUTPT VISIT, NEW, LEVL IV, 45-59 MIN  Patient PCP Information       Provider PCP Type    Alexsander Johnson DNP, FNP-C General            Reason for Visit / Chief Complaint: Wound Check       History of Present Illness / Problem Focused Workflow     Lorraine Brambila is a 70 yo wf referred by MARYANN Johnson NP for pressure injuries x 2 to thoracic and lumbar spine. Patient states she was hospitalized in March at [place] for Bowel Obstruction and areas were red but not open when she was discharged but opened up after she returned home. States they have been using hydrogel with silver for a couple of months and that is was healing, but stalled, so saw Ms. Johnson last week. Culture was obtained which grew out Staph and pseudomonas. She had already been placed on Keflex and continues to take it and Ms. Johnson talked with Dr. Orellana and started her on gentamicin and bactroban ointment to use topically. She also has hx of kidney cancer with numerous bony metastases including spine and rib lesions. Her  said that oncology was aware of lesions but weren't r/t cancer and that the latest scan showed improvement.  Her last note from oncology dated 25 reads that radiology read her CT scan and saw significant progression on her cora lesiions , specifically T7 and L3, which are in close proximity to where her current wounds are. She continues with chemo: Opdivo with cabometryx monthly. Also xgeva for bony therapy. Note also says that referral would be made to radiation oncology for radiotherapy to T7 and L3 but this was not mentioned at visit and notes were revewed after patient left.         Review of Systems      Review of Systems   Constitutional:  Positive for activity change, appetite change and fatigue.   HENT:  Negative for congestion, sneezing, sore throat and trouble swallowing.    Respiratory:  Negative for apnea, cough, chest tightness and shortness of breath.    Cardiovascular:  Negative for chest pain and leg swelling.   Gastrointestinal:  Negative for abdominal pain, nausea and vomiting.   Genitourinary:  Negative for dysuria, flank pain, hematuria and vaginal bleeding.   Skin:  Positive for wound. Negative for color change.   Neurological:  Positive for weakness. Negative for dizziness and seizures.   Psychiatric/Behavioral:  Negative for confusion.        Medical / Social / Family History     Past Medical History:   Diagnosis Date    COPD (chronic obstructive pulmonary disease)     Coronary artery disease     Fibromyalgia     History of non-ST elevation myocardial infarction (NSTEMI)     4/2019      Hyperlipidemia     Hypertension        Past Surgical History:   Procedure Laterality Date    CARDIAC CATHETERIZATION      HERNIA REPAIR      HYSTERECTOMY      OOPHORECTOMY         Social History  Ms. Lorraine Brambila  reports that she quit smoking about 6 years ago. Her smoking use included cigarettes. She started smoking about 56 years ago. She has a 50 pack-year smoking history. She has never used smokeless tobacco. She reports that she does not drink alcohol and does not use drugs.    Family History  Ms.'effie Brambila family history includes Heart attack in her father; Heart disease in her father.    Medications and Allergies     Medications  No outpatient medications have been marked as taking for the 5/28/25 encounter (Office Visit) with Cande Nava FNP.       Allergies  Review of patient's allergies indicates:  No Known Allergies    Physical Examination     Vitals:    05/28/25 1329   BP: 116/73   Pulse: 82   Resp: 19   Temp: 97.8 °F (36.6 °C)     Physical Exam  Constitutional:        Appearance: Normal appearance. She is ill-appearing.      Comments: Thin and frail 83 lbs   HENT:      Right Ear: External ear normal.      Left Ear: External ear normal.      Nose: Nose normal.      Mouth/Throat:      Mouth: Mucous membranes are moist.   Pulmonary:      Effort: Pulmonary effort is normal.   Musculoskeletal:      Right lower leg: No edema.      Left lower leg: No edema.      Comments: See LDA for details   Skin:     Comments: See LDA for details   Neurological:      Mental Status: She is alert and oriented to person, place, and time.      Motor: Weakness present.      Gait: Gait abnormal.   Psychiatric:         Mood and Affect: Mood normal.         Thought Content: Thought content normal.         Assessment and Plan             Wound 05/28/25 1342 Pressure Injury medial Thoracic spine #1 (Active)   05/28/25 1342 Thoracic spine   Present on Original Admission: Y   Primary Wound Type: Pressure inj   Side:    Orientation: medial   Wound Approximate Age at First Assessment (Weeks):    Wound Number: #1   Is this injury device related?: No   Incision Type:    Closure Method:    Wound Description (Comments):    Type:    Additional Comments:    Ankle-Brachial Index:    Pulses:    Removal Indication and Assessment: infection   Wound Outcome:    Wound Image    05/28/25 1545   Pressure Injury Stage 4 05/28/25 1545   Dressing Appearance Intact;Saturated 05/28/25 1545   Drainage Amount Large 05/28/25 1545   Drainage Characteristics/Odor Creamy;Green;Tan;Sanguineous;Yellow 05/28/25 1545   Appearance Wet;Green;Slough;Yellow;Tendon;Bone 05/28/25 1545   Periwound Area Redness;Traer;Gray 05/28/25 1545   Wound Edges Irregular 05/28/25 1545   Wound Length (cm) 6 cm 05/28/25 1545   Wound Width (cm) 1.5 cm 05/28/25 1545   Wound Depth (cm) 0.5 cm 05/28/25 1545   Wound Volume (cm^3) 2.356 cm^3 05/28/25 1545   Wound Surface Area (cm^2) 7.07 cm^2 05/28/25 1545   Care Wound cleanser;Other (see comments) 05/28/25 1545    Dressing Hydrogel;Silver;Island/border 05/28/25 1545            Wound 05/28/25 1345 Pressure Injury lower;medial Lumbar spine #2 (Active)   05/28/25 1345 Lumbar spine   Present on Original Admission: Y   Primary Wound Type: Pressure inj   Side:    Orientation: lower;medial   Wound Approximate Age at First Assessment (Weeks):    Wound Number: #2   Is this injury device related?: No   Incision Type:    Closure Method:    Wound Description (Comments):    Type:    Additional Comments:    Ankle-Brachial Index:    Pulses:    Removal Indication and Assessment: infection   Wound Outcome:    Wound Image    05/28/25 1552   Pressure Injury Stage U 05/28/25 1552   Dressing Appearance Moist drainage 05/28/25 1552   Drainage Amount Moderate 05/28/25 1552   Drainage Characteristics/Odor Green;Bishop;Sanguineous 05/28/25 1552   Appearance Green;Gray;Yellow 05/28/25 1552   Periwound Area Redness;Pustules 05/28/25 1552   Wound Edges Irregular 05/28/25 1552   Wound Length (cm) 1 cm 05/28/25 1552   Wound Width (cm) 0.7 cm 05/28/25 1552   Wound Depth (cm) 0.1 cm 05/28/25 1552   Wound Volume (cm^3) 0.037 cm^3 05/28/25 1552   Wound Surface Area (cm^2) 0.55 cm^2 05/28/25 1552   Care Wound cleanser;Other (see comments) 05/28/25 1552   Dressing Island/border;Hydrogel;Silver 05/28/25 1552     Problem List Items Addressed This Visit       Decubitus ulcer of back, stage 4    Current Assessment & Plan   Clean wound with vashe and apply hydrogel with silver, cover with adaptic (be sure it covers bone), then apply foam border gauze. Wound care to be done on Monday, Wednesday, and Friday and prn for soiling.    Home health consult to see patient 2x weekly and she will be seen once weekly at wound clinic    Nutrition  Supplement with daily multivitamin.  Nutritional supplement: Boost, Ensure, Tino    Hand Hygiene/Smoking Cessation  Wash hands before and after wound care. Call the Wound Center at [phone] if you have signs or symptoms of  infection, increased drainage, increasing odor, or unusual redness. After Wound Care hours, please notify your PCP or go to the ER.     RTC 1 week         Relevant Orders    Debridement    Pressure injury of back, unstageable    Current Assessment & Plan   Clean wound to lumbar spine with vashe and apply hydrogel with silver then cover/ secure with foam border gauze. Wound Care to be done on Monday, Wednesday, and Friday.           Relevant Orders    Debridement    Infection due to multidrug-resistant Pseudomonas aeruginosa    Current Assessment & Plan   Cefepime 1gm IV q 12 hours for 10 days. Faxed order to [facility] on 5/28/25. They will run her insurance to try and get it approved then proceed. [facility] will notify patient and home health once approval/ rejection returns.          Staph aureus infection    Current Assessment & Plan   Continue Keflex until finished.          Other Visit Diagnoses         Visit for wound check    -  Primary            Future Appointments   Date Time Provider Department Center   8/4/2025  1:30 PM Lucina Madison, ASAFP [facility]            Signature:  [facility]  [address] [place] [address] [id number] [phone]    Date of encounter: 5/28/25

## 2025-05-28 NOTE — TELEPHONE ENCOUNTER
Patient called and notified wound culture results and prescriptions sent to the pharmacy. Patient was asked if she was still taking her Lipitor. Patient verbalized understanding and states she is not taking lipitor but she has some and will start backing taking. MARYANN Johnson DNP,FNP-C notified.

## 2025-05-29 ENCOUNTER — DOCUMENTATION ONLY (OUTPATIENT)
Dept: WOUND CARE | Facility: HOSPITAL | Age: 70
End: 2025-05-29
Payer: MEDICARE

## 2025-05-29 NOTE — PROGRESS NOTES
RECEIVED A MESSAGE FROM [facility] THAT mRS. CRUZ IS SCHEDULE FOR 1ST DOSE OF CEFEPIME 2GM TOMORROW AT 1 AT [facility].  I CALLED [facility] AND LET THEM KNOW AND I LEFT A MESSAGE WITH ON CALL CENTER AT [facility] TO LET THEM KNOW AS WELL AND TO MAKE SURE THAT THEY RECEIVED THE REFERRAL THAT I FAXED THEM YESTERDAY FOR ADMISSION TO HOME HEALTH FOR WOUND CARE AND FOR IV CEFEPIME 2GM IV BID X 10 DAYS. I SPOKE WITH ANAND ON 5/28/25 AND LET HER KNOW THAT I WOULD BE FAXING REFERRAL ALONG WITH ORDERS AND NOTES, ETC., BUT JUST WANTED TO DOUBLE CHECK TO MAKE SURE THAT SHE RECEIVED IT.

## 2025-05-29 NOTE — PROGRESS NOTES
I spoke with Mr. Brambila today and let him know that [facility] had approved IV Cefepime and I placed an order/ treatment plan in [facility] for first dose to be given at [facility]. I messaged Cydney Cooper RN, TAY, Elaine Palacios RN, and [facility], Pharmacy Manager, and let them know. Once appointment is scheduled I asked them to let me know so that I could contact [facility] so they would know when to send supplies and meds for remaining doses.      I also discussed my concern with these wounds being more than just pressure and he said Dr. Harman was aware of them and they were from pressure and nothing else.    I called [facility] and spoke with Dr. Harman's nurse Helene and let her know that she was being seen by wound care and let her know about the pseudomonas infection and treatment with cefepime 2gm IV bid x 10 days. She also said the Dr. Harman was aware of wounds and saw them last visit and she would let him know about pseudomonas and IV abx.

## 2025-05-30 ENCOUNTER — INFUSION (OUTPATIENT)
Dept: INFUSION THERAPY | Facility: HOSPITAL | Age: 70
End: 2025-05-30
Attending: NURSE PRACTITIONER
Payer: MEDICARE

## 2025-05-30 VITALS
DIASTOLIC BLOOD PRESSURE: 65 MMHG | OXYGEN SATURATION: 96 % | TEMPERATURE: 98 F | RESPIRATION RATE: 17 BRPM | HEART RATE: 70 BPM | SYSTOLIC BLOOD PRESSURE: 131 MMHG

## 2025-05-30 DIAGNOSIS — A49.8 INFECTION DUE TO MULTIDRUG-RESISTANT PSEUDOMONAS AERUGINOSA: Primary | ICD-10-CM

## 2025-05-30 DIAGNOSIS — Z16.24 INFECTION DUE TO MULTIDRUG-RESISTANT PSEUDOMONAS AERUGINOSA: Primary | ICD-10-CM

## 2025-05-30 PROCEDURE — 63600175 PHARM REV CODE 636 W HCPCS: Performed by: NURSE PRACTITIONER

## 2025-05-30 PROCEDURE — 25000003 PHARM REV CODE 250: Performed by: NURSE PRACTITIONER

## 2025-05-30 RX ADMIN — CEFEPIME 2 G: 2 INJECTION, POWDER, FOR SOLUTION INTRAVENOUS at 01:05

## 2025-05-30 NOTE — PROGRESS NOTES
Pt tolerated medication without difficulty - iv left intact to left hand-- flushed with10 cc's normal saline- no redness or edema noted - iv covered with loose stockinette - circulation wnl's- ptdischarged home to care of self and -

## 2025-05-30 NOTE — PROGRESS NOTES
Pt for outpatient infusion - pt to er 6-  with pt - iv started on first attempt to left hand without redness or edema-number 22 placed to left hand- iv medication started without difficulty

## 2025-06-04 ENCOUNTER — OFFICE VISIT (OUTPATIENT)
Dept: WOUND CARE | Facility: HOSPITAL | Age: 70
End: 2025-06-04
Attending: NURSE PRACTITIONER
Payer: MEDICARE

## 2025-06-04 VITALS
HEIGHT: 66 IN | OXYGEN SATURATION: 95 % | RESPIRATION RATE: 18 BRPM | DIASTOLIC BLOOD PRESSURE: 66 MMHG | SYSTOLIC BLOOD PRESSURE: 103 MMHG | HEART RATE: 76 BPM | WEIGHT: 83.56 LBS | BODY MASS INDEX: 13.43 KG/M2 | TEMPERATURE: 98 F

## 2025-06-04 DIAGNOSIS — L89.100 PRESSURE INJURY OF BACK, UNSTAGEABLE: ICD-10-CM

## 2025-06-04 DIAGNOSIS — L89.104 DECUBITUS ULCER OF BACK, STAGE 4: ICD-10-CM

## 2025-06-04 DIAGNOSIS — Z51.89 VISIT FOR WOUND CHECK: Primary | ICD-10-CM

## 2025-06-04 PROCEDURE — 99213 OFFICE O/P EST LOW 20 MIN: CPT | Performed by: NURSE PRACTITIONER

## 2025-06-04 PROCEDURE — 11042 DBRDMT SUBQ TIS 1ST 20SQCM/<: CPT | Performed by: NURSE PRACTITIONER

## 2025-06-04 RX ORDER — CEFEPIME HYDROCHLORIDE 2 G/1
2 INJECTION, POWDER, FOR SOLUTION INTRAVENOUS 2 TIMES DAILY
COMMUNITY
Start: 2025-05-30

## 2025-06-08 ENCOUNTER — LAB REQUISITION (OUTPATIENT)
Dept: LAB | Facility: HOSPITAL | Age: 70
End: 2025-06-08
Attending: NURSE PRACTITIONER
Payer: MEDICARE

## 2025-06-08 DIAGNOSIS — B96.5 PSEUDOMONAS (AERUGINOSA) (MALLEI) (PSEUDOMALLEI) AS THE CAUSE OF DISEASES CLASSIFIED ELSEWHERE: ICD-10-CM

## 2025-06-08 DIAGNOSIS — B95.61 METHICILLIN SUSCEPTIBLE STAPHYLOCOCCUS AUREUS INFECTION AS THE CAUSE OF DISEASES CLASSIFIED ELSEWHERE: ICD-10-CM

## 2025-06-08 DIAGNOSIS — Z16.24 RESISTANCE TO MULTIPLE ANTIBIOTICS: ICD-10-CM

## 2025-06-08 DIAGNOSIS — L89.894 PRESSURE ULCER OF OTHER SITE, STAGE 4: ICD-10-CM

## 2025-06-08 LAB
ALBUMIN SERPL BCP-MCNC: 2.9 G/DL (ref 3.4–4.8)
ALBUMIN/GLOB SERPL: 0.9 {RATIO}
ALP SERPL-CCNC: 95 U/L (ref 40–150)
ALT SERPL W P-5'-P-CCNC: 41 U/L
ANION GAP SERPL CALCULATED.3IONS-SCNC: 14 MMOL/L (ref 7–16)
AST SERPL W P-5'-P-CCNC: 28 U/L (ref 11–45)
BASOPHILS # BLD AUTO: 0.02 K/UL (ref 0–0.2)
BASOPHILS NFR BLD AUTO: 0.3 % (ref 0–1)
BILIRUB SERPL-MCNC: 0.2 MG/DL
BUN SERPL-MCNC: 33 MG/DL (ref 10–20)
BUN/CREAT SERPL: 56 (ref 6–20)
CALCIUM SERPL-MCNC: 9.5 MG/DL (ref 8.4–10.2)
CHLORIDE SERPL-SCNC: 100 MMOL/L (ref 98–107)
CO2 SERPL-SCNC: 27 MMOL/L (ref 23–31)
CREAT SERPL-MCNC: 0.59 MG/DL (ref 0.55–1.02)
DIFFERENTIAL METHOD BLD: ABNORMAL
EGFR (NO RACE VARIABLE) (RUSH/TITUS): 97 ML/MIN/1.73M2
EOSINOPHIL # BLD AUTO: 0.04 K/UL (ref 0–0.5)
EOSINOPHIL NFR BLD AUTO: 0.7 % (ref 1–4)
ERYTHROCYTE [DISTWIDTH] IN BLOOD BY AUTOMATED COUNT: 14.4 % (ref 11.5–14.5)
GLOBULIN SER-MCNC: 3.3 G/DL (ref 2–4)
GLUCOSE SERPL-MCNC: 150 MG/DL (ref 82–115)
HCT VFR BLD AUTO: 35.6 % (ref 38–47)
HGB BLD-MCNC: 11.8 G/DL (ref 12–16)
IMM GRANULOCYTES # BLD AUTO: 0.07 K/UL (ref 0–0.04)
IMM GRANULOCYTES NFR BLD: 1.2 % (ref 0–0.4)
LYMPHOCYTES # BLD AUTO: 0.78 K/UL (ref 1–4.8)
LYMPHOCYTES NFR BLD AUTO: 13.5 % (ref 27–41)
MCH RBC QN AUTO: 34.6 PG (ref 27–31)
MCHC RBC AUTO-ENTMCNC: 33.1 G/DL (ref 32–36)
MCV RBC AUTO: 104.4 FL (ref 80–96)
MONOCYTES # BLD AUTO: 0.18 K/UL (ref 0–0.8)
MONOCYTES NFR BLD AUTO: 3.1 % (ref 2–6)
MPC BLD CALC-MCNC: 8.6 FL (ref 9.4–12.4)
NEUTROPHILS # BLD AUTO: 4.69 K/UL (ref 1.8–7.7)
NEUTROPHILS NFR BLD AUTO: 81.2 % (ref 53–65)
NRBC # BLD AUTO: 0 X10E3/UL
NRBC, AUTO (.00): 0 %
PLATELET # BLD AUTO: 179 K/UL (ref 150–400)
POTASSIUM SERPL-SCNC: 4.4 MMOL/L (ref 3.5–5.1)
PROT SERPL-MCNC: 6.2 G/DL (ref 5.8–7.6)
RBC # BLD AUTO: 3.41 M/UL (ref 4.2–5.4)
SODIUM SERPL-SCNC: 137 MMOL/L (ref 136–145)
WBC # BLD AUTO: 5.78 K/UL (ref 4.5–11)

## 2025-06-08 PROCEDURE — 80053 COMPREHEN METABOLIC PANEL: CPT | Performed by: NURSE PRACTITIONER

## 2025-06-08 PROCEDURE — 85025 COMPLETE CBC W/AUTO DIFF WBC: CPT | Performed by: NURSE PRACTITIONER

## 2025-06-09 ENCOUNTER — OFFICE VISIT (OUTPATIENT)
Dept: WOUND CARE | Facility: HOSPITAL | Age: 70
End: 2025-06-09
Attending: NURSE PRACTITIONER
Payer: MEDICARE

## 2025-06-09 VITALS
SYSTOLIC BLOOD PRESSURE: 114 MMHG | DIASTOLIC BLOOD PRESSURE: 66 MMHG | OXYGEN SATURATION: 98 % | HEART RATE: 86 BPM | TEMPERATURE: 98 F

## 2025-06-09 DIAGNOSIS — L89.100 PRESSURE INJURY OF BACK, UNSTAGEABLE: Primary | ICD-10-CM

## 2025-06-09 DIAGNOSIS — L89.104 DECUBITUS ULCER OF BACK, STAGE 4: ICD-10-CM

## 2025-06-09 PROCEDURE — 99499 UNLISTED E&M SERVICE: CPT | Performed by: NURSE PRACTITIONER

## 2025-06-09 PROCEDURE — 11042 DBRDMT SUBQ TIS 1ST 20SQCM/<: CPT | Performed by: NURSE PRACTITIONER

## 2025-06-09 PROCEDURE — 11044 DBRDMT BONE 1ST 20 SQ CM/<: CPT

## 2025-06-09 RX ORDER — COLLAGENASE SANTYL 250 [ARB'U]/G
OINTMENT TOPICAL
Qty: 30 G | Refills: 1 | Status: SHIPPED | OUTPATIENT
Start: 2025-06-09

## 2025-06-09 NOTE — PATIENT INSTRUCTIONS
6/9/25  Wound care instructions for stage 4 decubitus (thoracic spine):  Clean wound with vashe and apply hydrogel with silver, cover with adaptic (be sure it covers bone), then apply foam border gauze. OK to also use moleskin to periwound to provide offloading to wound prior to applying foam border gauze like Mepilex.  Wound care to be done on Monday, Wednesday, and Friday and prn for soiling.    6/9/25  Daily wound care instructions for decubitus to lumbar spine:   Clean with vashe and apply santyl nickel thick to lumbar spine decubitus. Use offloading moleskin to surround area to decrease pressure, then cover with foam border dressing like mepilex.     Nutrition  Supplement with daily multivitamin.  Nutritional supplement: Boost, Ensure, Tino     Hand Hygiene/Smoking Cessation  Wash hands before and after wound care. Call the Wound Center at [phone] if you have signs or symptoms of infection, increased drainage, increasing odor, or unusual redness. After Wound Care hours, please notify your PCP or go to the ER.     Home health to come 2x weekly

## 2025-06-16 DIAGNOSIS — E78.00 HYPERCHOLESTEROLEMIA: ICD-10-CM

## 2025-06-16 RX ORDER — ATORVASTATIN CALCIUM 40 MG/1
40 TABLET, FILM COATED ORAL NIGHTLY
Qty: 90 TABLET | Refills: 1 | Status: SHIPPED | OUTPATIENT
Start: 2025-06-16

## 2025-06-17 NOTE — ASSESSMENT & PLAN NOTE
6/9/25  Wound care instructions for stage 4 decubitus (thoracic spine):  Clean wound with vashe and apply hydrogel with silver, cover with adaptic (be sure it covers bone), then apply foam border gauze. OK to also use moleskin to periwound to provide offloading to wound prior to applying foam border gauze like Mepilex.  Wound care to be done on Monday, Wednesday, and Friday and prn for soiling.     Home health consult to see patient 2x weekly and she will be seen once weekly at wound clinic     Nutrition  Supplement with daily multivitamin.  Nutritional supplement: Boost, Ensure, Tino     Hand Hygiene/Smoking Cessation  Wash hands before and after wound care. Call the Wound Center at [phone] if you have signs or symptoms of infection, increased drainage, increasing odor, or unusual redness. After Wound Care hours, please notify your PCP or go to the ER.

## 2025-06-17 NOTE — PROGRESS NOTES
GIUSEPPE Robledo   [facility]  [address] [place], [place] [id number] [phone]      PATIENT NAME: Lorraine Brambila  : 1955  DATE: 25  MRN: [id number]      Billing Provider: GIUSEPPE Robledo  Level of Service:   Patient PCP Information       Provider PCP Type    Alexsander Johnson, KYLER, FNP-C General            Reason for Visit / Chief Complaint: Wound Check       History of Present Illness / Problem Focused Workflow     Lorraine Brambila is a 68 yo wf referred by MARYANN Johnson NP for pressure injuries x 2 to thoracic and lumbar spine. Patient states she was hospitalized in March at [place] for Bowel Obstruction and areas were red but not open when she was discharged but opened up after she returned home. States they have been using hydrogel with silver for a couple of months and that is was healing, but stalled, so saw Ms. Johnson last week. Culture was obtained which grew out Staph and pseudomonas. She had already been placed on Keflex and continues to take it and Ms. Johnson talked with Dr. Orellana and started her on gentamicin and bactroban ointment to use topically. She also has hx of kidney cancer with numerous bony metastases including spine and rib lesions. Her  said that oncology was aware of lesions but weren't r/t cancer and that the latest scan showed improvement.  Her last note from oncology dated 25 reads that radiology read her CT scan and saw significant progression on her cora lesiions , specifically T7 and L3, which are in close proximity to where her current wounds are. She continues with chemo: Opdivo with cabometryx monthly. Also xgeva for bony therapy. Note also says that referral would be made to radiation oncology for radiotherapy to T7 and L3 but this was not mentioned at visit and notes were revewed after patient left.     2025 Presents for wound care. Not much change in either wound.  Because of the way the bone is protruding out doubt it will cover without surgical intervention. Patient and  confirm that she had radiation to spine but her  says that those are not the places that received treatment. Will continue present therapy for now and see how healing progresses. Discussed need for Tino to help with healing. She goes back to oncologist, Dr. Harman, on 6/19 per patient hx. 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 Denies any problems with it.    6/9/25 Areas look clean but not much change- Mrs. Brambila does not want to see surgeon for wounds. He got her some Tino to drink so she has been doing this 1-2 times a day and she feels like she has gained a little weight. She has an appointment next Monday with Dr. Harman so will return to wound clinic in 2 weeks.        Review of Systems     Review of Systems   Constitutional:  Positive for activity change, appetite change and fatigue.   HENT:  Negative for congestion, sneezing, sore throat and trouble swallowing.    Respiratory:  Negative for apnea, cough, chest tightness and shortness of breath.    Cardiovascular:  Negative for chest pain and leg swelling.   Gastrointestinal:  Negative for abdominal pain, nausea and vomiting.   Genitourinary:  Negative for dysuria, flank pain, hematuria and vaginal bleeding.   Skin:  Positive for wound. Negative for color change.   Neurological:  Positive for weakness. Negative for dizziness and seizures.   Psychiatric/Behavioral:  Negative for confusion.        Medical / Social / Family History     Past Medical History:   Diagnosis Date    COPD (chronic obstructive pulmonary disease)     Coronary artery disease     Fibromyalgia     History of non-ST elevation myocardial infarction (NSTEMI)     4/2019      Hyperlipidemia     Hypertension        Past Surgical History:   Procedure Laterality Date    CARDIAC  CATHETERIZATION      HERNIA REPAIR      HYSTERECTOMY      OOPHORECTOMY         Social History  Ms. Lorraine Brambila  reports that she quit smoking about 6 years ago. Her smoking use included cigarettes. She started smoking about 56 years ago. She has a 50 pack-year smoking history. She has never used smokeless tobacco. She reports that she does not drink alcohol and does not use drugs.    Family History  Ms.'s Lorraine Brambila family history includes Heart attack in her father; Heart disease in her father.    Medications and Allergies     Medications  No outpatient medications have been marked as taking for the 6/9/25 encounter (Office Visit) with Cande Nava FNP.       Allergies  Review of patient's allergies indicates:  No Known Allergies    Physical Examination     Vitals:    06/09/25 1443   BP: 114/66   Pulse: 86   Temp: 98.4 °F (36.9 °C)     Physical Exam  Constitutional:       Appearance: Normal appearance. She is ill-appearing.      Comments: Thin and frail 83 lbs   HENT:      Right Ear: External ear normal.      Left Ear: External ear normal.      Nose: Nose normal.      Mouth/Throat:      Mouth: Mucous membranes are moist.   Pulmonary:      Effort: Pulmonary effort is normal.   Musculoskeletal:      Right lower leg: No edema.      Left lower leg: No edema.      Comments: See LDA for details   Skin:     Comments: See LDA for details   Neurological:      Mental Status: She is alert and oriented to person, place, and time.      Motor: Weakness present.      Gait: Gait abnormal.   Psychiatric:         Mood and Affect: Mood normal.         Thought Content: Thought content normal.         Assessment and Plan             Wound 05/28/25 1342 Pressure Injury medial Thoracic spine #1 (Active)   05/28/25 1342 Thoracic spine   Present on Original Admission: Y   Primary Wound Type: Pressure inj   Side:    Orientation: medial   Wound Approximate Age at First Assessment (Weeks):    Wound Number: #1   Is this  injury device related?: No   Incision Type:    Closure Method:    Wound Description (Comments):    Type:    Additional Comments:    Ankle-Brachial Index:    Pulses:    Removal Indication and Assessment: infection   Wound Outcome:    Wound Image    06/09/25 1500   Pressure Injury Stage 4 06/09/25 1457   Dressing Appearance Moist drainage 06/09/25 1457   Drainage Amount Moderate 06/09/25 1457   Drainage Characteristics/Odor Creamy;Tan;Yellow 06/09/25 1457   Appearance Yellow;Bone;Tan;Muscle 06/09/25 1457   Periwound Area Maroon;Esparto 06/09/25 1457   Wound Edges Irregular 06/09/25 1457   Wound Length (cm) 5.8 cm 06/09/25 1457   Wound Width (cm) 1.5 cm 06/09/25 1457   Wound Depth (cm) 0.3 cm 06/09/25 1457   Wound Volume (cm^3) 1.367 cm^3 06/09/25 1457   Wound Surface Area (cm^2) 6.83 cm^2 06/09/25 1457   Care Antimicrobial agent 06/09/25 1457   Dressing Silver;Hydrogel 06/09/25 1457   Periwound Care Other (see comments) 06/09/25 1457            Wound 05/28/25 1345 Pressure Injury lower;medial Lumbar spine #2 (Active)   05/28/25 1345 Lumbar spine   Present on Original Admission: Y   Primary Wound Type: Pressure inj   Side:    Orientation: lower;medial   Wound Approximate Age at First Assessment (Weeks):    Wound Number: #2   Is this injury device related?: No   Incision Type:    Closure Method:    Wound Description (Comments):    Type:    Additional Comments:    Ankle-Brachial Index:    Pulses:    Removal Indication and Assessment: infection   Wound Outcome:    Wound Image    06/09/25 1500   Pressure Injury Stage U 06/09/25 1449   Dressing Appearance Moist drainage 06/09/25 1449   Drainage Amount Moderate 06/09/25 1449   Drainage Characteristics/Odor Yellow 06/09/25 1449   Appearance Yellow 06/09/25 1449   Periwound Area Esparto;Redness 06/09/25 1449   Wound Length (cm) 1.2 cm 06/09/25 1449   Wound Width (cm) 1.8 cm 06/09/25 1449   Wound Depth (cm) 0.1 cm 06/09/25 1449   Wound Volume (cm^3) 0.113 cm^3 06/09/25 1449   Wound  Surface Area (cm^2) 1.7 cm^2 06/09/25 1449   Care Cleansed with:;Antimicrobial agent;Debrided 06/09/25 1449       Problem List Items Addressed This Visit       Decubitus ulcer of back, stage 4    Current Assessment & Plan   6/9/25  Wound care instructions for stage 4 decubitus (thoracic spine):  Clean wound with vashe and apply hydrogel with silver, cover with adaptic (be sure it covers bone), then apply foam border gauze. OK to also use moleskin to periwound to provide offloading to wound prior to applying foam border gauze like Mepilex.  Wound care to be done on Monday, Wednesday, and Friday and prn for soiling.     Home health consult to see patient 2x weekly and she will be seen once weekly at wound clinic     Nutrition  Supplement with daily multivitamin.  Nutritional supplement: Boost, Ensure, Tino     Hand Hygiene/Smoking Cessation  Wash hands before and after wound care. Call the Wound Center at [phone] if you have signs or symptoms of infection, increased drainage, increasing odor, or unusual redness. After Wound Care hours, please notify your PCP or go to the ER.          Pressure injury of back, unstageable - Primary    Current Assessment & Plan   6/9/25  Daily wound care instructions: Clean with vashe and apply santyl nickel thick to lumbar spine decubitus. Use offloading moleskin to surround area to decrease pressure, then cover with foam border dressing like mepilex.    Nutrition  Supplement with daily multivitamin.  Nutritional supplement: Boost, Ensure, Tino    Hand Hygiene/Smoking Cessation  Wash hands before and after wound care. Call the Wound Center at [phone] if you have signs or symptoms of infection, increased drainage, increasing odor, or unusual redness. After Wound Care hours, please notify your PCP or go to the ER.          Relevant Medications    collagenase (SANTYL) ointment    Other Relevant Orders    Debridement     Will consider santyl or medi honey to bottom wound to aid  in debridement when she comes next week.    Future Appointments   Date Time Provider Department Center   6/23/2025  1:00 PM Cande Nava FNP [facility]   8/4/2025  1:30 [facility]            Signature:  GIUSEPPE Robledo  [facility]  [address] [place], [place] [id number] [phone]    Date of encounter: 6/9/25

## 2025-06-17 NOTE — ASSESSMENT & PLAN NOTE
6/9/25  Daily wound care instructions: Clean with vashe and apply santyl nickel thick to lumbar spine decubitus. Use offloading moleskin to surround area to decrease pressure, then cover with foam border dressing like mepilex.    Nutrition  Supplement with daily multivitamin.  Nutritional supplement: Boost, Ensure, Tino    Hand Hygiene/Smoking Cessation  Wash hands before and after wound care. Call the Wound Center at [phone] if you have signs or symptoms of infection, increased drainage, increasing odor, or unusual redness. After Wound Care hours, please notify your PCP or go to the ER.

## 2025-06-17 NOTE — PROCEDURES
"Debridement    Date/Time: 6/9/2025 1:24 PM    Performed by: Cande Nava FNP  Authorized by: Cande Nava FNP  Associated wounds:        Wound 05/28/25 1345 Pressure Injury lower;medial Lumbar spine #2    Time out: Immediately prior to procedure a "time out" was called to verify the correct patient, procedure, equipment, support staff and site/side marked as required.    Consent Done?:  Yes (Verbal) and Yes (Written)    Preparation: Patient was prepped and draped in usual sterile fashion and Patient was prepped and draped with aseptic technique    Local anesthesia used?: Yes    Local anesthetic:  Lidocaine 2% topical gel    Wound Details:    Location:  Back (lumbar spine)       Length (cm):  1.2       Width (cm):  1.8       Depth (cm):  0.1       Area (sq cm):  1.7       Percent Debrided (%):  50       Total Area Debrided (sq cm):  0.85    Depth of debridement:  Subcutaneous tissue    Devitalized tissue debrided:  Slough    Instruments:  Curette  Bleeding:  Minimal  Hemostasis Achieved: Yes  Method Used:  Pressure  Patient tolerance:  Patient tolerated the procedure well with no immediate complications  1st Wound Pain Assessment: 1    "

## 2025-06-23 ENCOUNTER — OFFICE VISIT (OUTPATIENT)
Dept: WOUND CARE | Facility: HOSPITAL | Age: 70
End: 2025-06-23
Attending: NURSE PRACTITIONER
Payer: MEDICARE

## 2025-06-23 VITALS
DIASTOLIC BLOOD PRESSURE: 78 MMHG | OXYGEN SATURATION: 98 % | HEART RATE: 77 BPM | SYSTOLIC BLOOD PRESSURE: 150 MMHG | TEMPERATURE: 98 F | RESPIRATION RATE: 16 BRPM

## 2025-06-23 DIAGNOSIS — L89.104 DECUBITUS ULCER OF BACK, STAGE 4: ICD-10-CM

## 2025-06-23 DIAGNOSIS — Z51.89 VISIT FOR WOUND CHECK: ICD-10-CM

## 2025-06-23 DIAGNOSIS — L89.100 PRESSURE INJURY OF BACK, UNSTAGEABLE: Primary | ICD-10-CM

## 2025-06-23 PROCEDURE — 11042 DBRDMT SUBQ TIS 1ST 20SQCM/<: CPT | Performed by: NURSE PRACTITIONER

## 2025-06-23 PROCEDURE — 99499 UNLISTED E&M SERVICE: CPT | Performed by: NURSE PRACTITIONER

## 2025-06-23 PROCEDURE — 11042 DBRDMT SUBQ TIS 1ST 20SQCM/<: CPT

## 2025-06-23 NOTE — PATIENT INSTRUCTIONS
Current Assessment & Plan   6/9/25  Wound care instructions for stage 4 decubitus (thoracic spine):  Clean wound with vashe and apply hydrogel with silver and collagen gel, cover with adaptic (be sure it covers bone), then apply foam border gauze.   .  Wound care to be done on Monday, Wednesday, and Friday and prn for soiling.     Home health consult to see patient 2x weekly and she will be seen once weekly at wound clinic     Nutrition  Supplement with daily multivitamin.  Nutritional supplement: Boost, Ensure, Tino     Hand Hygiene/Smoking Cessation  Wash hands before and after wound care. Call the Wound Center at [phone] if you have signs or symptoms of infection, increased drainage, increasing odor, or unusual redness. After Wound Care hours, please notify your PCP or go to the ER.             Pressure injury of back, unstageable - Primary     Current Assessment & Plan   6/9/25  Daily wound care instructions: Clean with vashe and apply santyl nickel thick to lumbar spine decubitus.    then cover with foam border dressing like mepilex.     Nutrition  Supplement with daily multivitamin.  Nutritional supplement: Boost, Ensure, Tino     Hand Hygiene/Smoking Cessation  Wash hands before and after wound care. Call the Wound Center at [phone] if you have signs or symptoms of infection, increased drainage, increasing odor, or unusual redness. After Wound Care hours, please notify your PCP or go to the ER.

## 2025-06-26 DIAGNOSIS — I25.10 CORONARY ARTERY DISEASE INVOLVING NATIVE CORONARY ARTERY OF NATIVE HEART WITHOUT ANGINA PECTORIS: ICD-10-CM

## 2025-06-27 RX ORDER — CLOPIDOGREL BISULFATE 75 MG/1
75 TABLET ORAL
Qty: 90 TABLET | Refills: 1 | Status: SHIPPED | OUTPATIENT
Start: 2025-06-27

## 2025-06-28 NOTE — ASSESSMENT & PLAN NOTE
6/23/25  Daily wound care instructions: Clean with vashe and apply santyl nickel thick to lumbar spine decubitus, then cover with foam border dressing like mepilex.     Nutrition  Supplement with daily multivitamin.  Nutritional supplement: Boost, Ensure, Tino     Hand Hygiene/Smoking Cessation  Wash hands before and after wound care. Call the Wound Center at [phone] if you have signs or symptoms of infection, increased drainage, increasing odor, or unusual redness. After Wound Care hours, please notify your PCP or go to the ER.

## 2025-06-28 NOTE — PROCEDURES
"Debridement    Date/Time: 6/23/2025 1:55 PM    Performed by: Cande Nava FNP  Authorized by: Cande Nava FNP  Associated wounds:        Wound 05/28/25 1345 Pressure Injury lower;medial Lumbar spine #2    Time out: Immediately prior to procedure a "time out" was called to verify the correct patient, procedure, equipment, support staff and site/side marked as required.    Consent Done?:  Yes (Verbal) and Yes (Written)  Local anesthesia used?: Yes    Local anesthetic:  Lidocaine 2% topical gel    Wound Details:    Location:  Back (lumbar spine)       Length (cm):  1.5       Width (cm):  1.4       Depth (cm):  0.1       Area (sq cm):  1.65       Percent Debrided (%):  100       Total Area Debrided (sq cm):  1.65    Depth of debridement:  Subcutaneous tissue    Tissue debrided:  Subcutaneous    Devitalized tissue debrided:  Slough    Instruments:  Curette  Bleeding:  Minimal  Hemostasis Achieved: Yes  Method Used:  Pressure  Patient tolerance:  Patient tolerated the procedure well with no immediate complications  1st Wound Pain Assessment: 3    "

## 2025-06-28 NOTE — PROGRESS NOTES
GIUSEPPE Robledo   [facility]  [address] [place], [place] [id number] [phone]      PATIENT NAME: Lorraine Brambila  : 1955  DATE: 25  MRN: [id number]      Billing Provider: GIUSEPPE Robledo  Level of Service:   Patient PCP Information       Provider PCP Type    Alexsander Johnson, KYLER, FNP-C General            Reason for Visit / Chief Complaint: Wound Check       History of Present Illness / Problem Focused Workflow     Lorraine Brambila is a 68 yo wf referred by MARYANN Johnson NP for pressure injuries x 2 to thoracic and lumbar spine. Patient states she was hospitalized in March at [facility] for Bowel Obstruction and areas were red but not open when she was discharged but opened up after she returned home. States they have been using hydrogel with silver for a couple of months and that is was healing, but stalled, so saw Ms. Johnson last week. Culture was obtained which grew out Staph and pseudomonas. She had already been placed on Keflex and continues to take it and Ms. Johnson talked with Dr. Orellana and started her on gentamicin and bactroban ointment to use topically. She also has hx of kidney cancer with numerous bony metastases including spine and rib lesions. Her  said that oncology was aware of lesions but weren't r/t cancer and that the latest scan showed improvement.  Her last note from oncology dated 25 reads that radiology read her CT scan and saw significant progression on her cora lesiions , specifically T7 and L3, which are in close proximity to where her current wounds are. She continues with chemo: Opdivo with cabometryx monthly. Also xgeva for bony therapy. Note also says that referral would be made to radiation oncology for radiotherapy to T7 and L3 but this was not mentioned at visit and notes were revewed after patient left.     2025 Presents for wound care. Not much change in either wound.  Because of the way the bone is protruding out doubt it will cover without surgical intervention. Patient and  confirm that she had radiation to spine but her  says that those are not the places that received treatment. Will continue present therapy for now and see how healing progresses. Discussed need for Tino to help with healing. She goes back to oncologist, Dr. Harman, on 6/19 per patient hx. 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 Denies any problems with it.    6/9/25 Areas look clean but not much change- Mrs. Brambila does not want to see surgeon for wounds. He got her some Tino to drink so she has been doing this 1-2 times a day and she feels like she has gained a little weight. She has an appointment next Monday with Dr. Harman so will return to wound clinic in 2 weeks.    6/23/25 Wounds remain stable.         Review of Systems     Review of Systems   Constitutional:  Positive for activity change, appetite change and fatigue.   HENT:  Negative for congestion, sneezing, sore throat and trouble swallowing.    Respiratory:  Negative for apnea, cough, chest tightness and shortness of breath.    Cardiovascular:  Negative for chest pain and leg swelling.   Gastrointestinal:  Negative for abdominal pain, nausea and vomiting.   Genitourinary:  Negative for dysuria, flank pain, hematuria and vaginal bleeding.   Skin:  Positive for wound. Negative for color change.   Neurological:  Positive for weakness. Negative for dizziness and seizures.   Psychiatric/Behavioral:  Negative for confusion.        Medical / Social / Family History     Past Medical History:   Diagnosis Date    COPD (chronic obstructive pulmonary disease)     Coronary artery disease     Fibromyalgia     History of non-ST elevation myocardial infarction (NSTEMI)     4/2019      Hyperlipidemia     Hypertension        Past Surgical History:   Procedure  Laterality Date    CARDIAC CATHETERIZATION      HERNIA REPAIR      HYSTERECTOMY      OOPHORECTOMY         Social History  Ms. Lorraine Brambila  reports that she quit smoking about 6 years ago. Her smoking use included cigarettes. She started smoking about 56 years ago. She has a 50 pack-year smoking history. She has never used smokeless tobacco. She reports that she does not drink alcohol and does not use drugs.    Family History  Ms.'s Lorraine Brambila family history includes Heart attack in her father; Heart disease in her father.    Medications and Allergies     Medications  No outpatient medications have been marked as taking for the 6/23/25 encounter (Office Visit) with Cande Nava FNP.       Allergies  Review of patient's allergies indicates:  No Known Allergies    Physical Examination     Vitals:    06/23/25 1314   BP: (!) 150/78   Pulse: 77   Resp: 16   Temp: 97.8 °F (36.6 °C)     Physical Exam  Constitutional:       Appearance: Normal appearance. She is ill-appearing.      Comments: Thin and frail 83 lbs   HENT:      Right Ear: External ear normal.      Left Ear: External ear normal.      Nose: Nose normal.      Mouth/Throat:      Mouth: Mucous membranes are moist.   Pulmonary:      Effort: Pulmonary effort is normal.   Musculoskeletal:      Right lower leg: No edema.      Left lower leg: No edema.      Comments: See LDA for details   Skin:     Comments: See LDA for details   Neurological:      Mental Status: She is alert and oriented to person, place, and time.      Motor: Weakness present.      Gait: Gait abnormal.   Psychiatric:         Mood and Affect: Mood normal.         Thought Content: Thought content normal.         Assessment and Plan             Wound 05/28/25 1342 Pressure Injury medial Thoracic spine #1 (Active)   05/28/25 1342 Thoracic spine   Present on Original Admission: Y   Primary Wound Type: Pressure inj   Side:    Orientation: medial   Wound Approximate Age at First  Assessment (Weeks):    Wound Number: #1   Is this injury device related?: No   Incision Type:    Closure Method:    Wound Description (Comments):    Type:    Additional Comments:    Ankle-Brachial Index:    Pulses:    Removal Indication and Assessment: infection   Wound Outcome:    Wound Image    06/23/25 1659   Pressure Injury Stage 4 06/23/25 1659   Dressing Appearance Moist drainage;Saturated 06/23/25 1659   Drainage Amount Copious 06/23/25 1659   Drainage Characteristics/Odor Yellow;Malodorous;Creamy 06/23/25 1659   Appearance Yellow;Moist;Tan;Eschar;White;Slough 06/23/25 1659   Periwound Area Maroon 06/23/25 1659   Wound Edges Irregular 06/23/25 1659   Wound Length (cm) 6.5 cm 06/23/25 1659   Wound Width (cm) 1.7 cm 06/23/25 1659   Wound Depth (cm) 1 cm 06/23/25 1659   Wound Volume (cm^3) 5.786 cm^3 06/23/25 1659   Wound Surface Area (cm^2) 8.68 cm^2 06/23/25 1659   Care Cleansed with:;Antimicrobial agent 06/23/25 1659   Dressing Removed;Collagen;Other (comment);Foam;Island/border 06/23/25 1659            Wound 05/28/25 1345 Pressure Injury lower;medial Lumbar spine #2 (Active)   05/28/25 1345 Lumbar spine   Present on Original Admission: Y   Primary Wound Type: Pressure inj   Side:    Orientation: lower;medial   Wound Approximate Age at First Assessment (Weeks):    Wound Number: #2   Is this injury device related?: No   Incision Type:    Closure Method:    Wound Description (Comments):    Type:    Additional Comments:    Ankle-Brachial Index:    Pulses:    Removal Indication and Assessment: infection   Wound Outcome:    Wound Image    06/23/25 1707   Pressure Injury Stage U 06/23/25 1707   Dressing Appearance Moist drainage 06/23/25 1707   Drainage Amount Moderate 06/23/25 1707   Drainage Characteristics/Odor Yellow 06/23/25 1707   Appearance Yellow;Moist;Eschar;Slough 06/23/25 1707   Wound Length (cm) 1.5 cm 06/23/25 1707   Wound Width (cm) 1.4 cm 06/23/25 1707   Wound Depth (cm) 0.1 cm 06/23/25 1707   Wound  Volume (cm^3) 0.11 cm^3 06/23/25 1707   Wound Surface Area (cm^2) 1.65 cm^2 06/23/25 1707   Care Cleansed with:;Antimicrobial agent 06/23/25 1707   Dressing Other (comment);Island/border;Foam 06/23/25 1707       Problem List Items Addressed This Visit       Decubitus ulcer of back, stage 4    Current Assessment & Plan   6/23/25  Wound care instructions for stage 4 decubitus (thoracic spine):  Clean wound with vashe and mix hydrogel with silver and hydrogel with collagen and apply to wound, cover with adaptic (be sure it covers bone), then apply foam border gauze.  Wound care to be done on Monday, Wednesday, and Friday and prn for soiling.     Home health to see patient 2x weekly and she will be seen once weekly at wound clinic     Nutrition  Supplement with daily multivitamin.  Nutritional supplement: Boost, Ensure, Tino     Hand Hygiene/Smoking Cessation  Wash hands before and after wound care. Call the Wound Center at [phone] if you have signs or symptoms of infection, increased drainage, increasing odor, or unusual redness. After Wound Care hours, please notify your PCP or go to the ER.          Pressure injury of back, unstageable    Current Assessment & Plan   6/23/25  Daily wound care instructions: Clean with vashe and apply santyl nickel thick to lumbar spine decubitus, then cover with foam border dressing like mepilex.     Nutrition  Supplement with daily multivitamin.  Nutritional supplement: Boost, Ensure, Tino     Hand Hygiene/Smoking Cessation  Wash hands before and after wound care. Call the Wound Center at [phone] if you have signs or symptoms of infection, increased drainage, increasing odor, or unusual redness. After Wound Care hours, please notify your PCP or go to the ER.             Other Visit Diagnoses         Visit for wound check    -  Primary              Future Appointments   Date Time Provider Department Center   6/30/2025  1:00 PM Cande Nava FNP [facility]    8/4/2025  1:30 PM Lucina Madison, ASAFP RMOBC CARD [facility]            Signature:  [facility]  [address] [place] [id number] [phone]    Date of encounter: 6/23/25

## 2025-06-28 NOTE — ASSESSMENT & PLAN NOTE
6/23/25  Wound care instructions for stage 4 decubitus (thoracic spine):  Clean wound with vashe and mix hydrogel with silver and hydrogel with collagen and apply to wound, cover with adaptic (be sure it covers bone), then apply foam border gauze.  Wound care to be done on Monday, Wednesday, and Friday and prn for soiling.     Home health to see patient 2x weekly and she will be seen once weekly at wound clinic     Nutrition  Supplement with daily multivitamin.  Nutritional supplement: Boost, Ensure, Tino     Hand Hygiene/Smoking Cessation  Wash hands before and after wound care. Call the Wound Center at [phone] if you have signs or symptoms of infection, increased drainage, increasing odor, or unusual redness. After Wound Care hours, please notify your PCP or go to the ER.

## 2025-06-30 ENCOUNTER — OFFICE VISIT (OUTPATIENT)
Dept: WOUND CARE | Facility: HOSPITAL | Age: 70
End: 2025-06-30
Attending: NURSE PRACTITIONER
Payer: MEDICARE

## 2025-06-30 VITALS
TEMPERATURE: 98 F | SYSTOLIC BLOOD PRESSURE: 141 MMHG | OXYGEN SATURATION: 96 % | RESPIRATION RATE: 18 BRPM | HEART RATE: 86 BPM | DIASTOLIC BLOOD PRESSURE: 85 MMHG

## 2025-06-30 DIAGNOSIS — L89.104 DECUBITUS ULCER OF BACK, STAGE 4: ICD-10-CM

## 2025-06-30 DIAGNOSIS — L89.100 PRESSURE INJURY OF BACK, UNSTAGEABLE: Primary | ICD-10-CM

## 2025-06-30 PROCEDURE — 87070 CULTURE OTHR SPECIMN AEROBIC: CPT | Performed by: NURSE PRACTITIONER

## 2025-06-30 PROCEDURE — 97602 WOUND(S) CARE NON-SELECTIVE: CPT | Performed by: NURSE PRACTITIONER

## 2025-06-30 PROCEDURE — 99499 UNLISTED E&M SERVICE: CPT | Performed by: NURSE PRACTITIONER

## 2025-06-30 PROCEDURE — 99213 OFFICE O/P EST LOW 20 MIN: CPT | Performed by: NURSE PRACTITIONER

## 2025-06-30 NOTE — PATIENT INSTRUCTIONS
Wound care instructions for stage 4 decubitus (thoracic spine):  Clean wound with vashe and mix hydrogel with silver and hydrogel with collagen and apply to wound, cover with adaptic (be sure it covers bone), then apply foam border gauze.  Wound care to be done on Monday, Wednesday, and Friday and prn for soiling.    Daily wound care instructions for lumbar decubitus: Clean with vashe and apply santyl nickel thick to lumbar spine decubitus, then cover with foam border dressing like mepilex.      Home health to see patient 2x weekly and she will be seen once weekly at wound clinic     Nutrition  Supplement with daily multivitamin.  Nutritional supplement: Boost, Ensure, Tino     Hand Hygiene/Smoking Cessation  Wash hands before and after wound care. Call the Wound Center at [phone] if you have signs or symptoms of infection, increased drainage, increasing odor, or unusual redness. After Wound Care hours, please notify your PCP or go to the ER.

## 2025-06-30 NOTE — PROGRESS NOTES
GIUSEPPE Robledo   [facility]  [address] [place], [place] [id number] [phone]      PATIENT NAME: Lorraine Brambila  : 1955  DATE: 25  MRN: [id number]      Billing Provider: GIUSEPPE Robledo  Level of Service: NO LOS  Patient PCP Information       Provider PCP Type    Alexsander Johnson DNP, FNP-C General            Reason for Visit / Chief Complaint: Wound Check       History of Present Illness / Problem Focused Workflow     Lorraine Brambila is a 68 yo wf referred by MARYANN Johnson NP for pressure injuries x 2 to thoracic and lumbar spine. Patient states she was hospitalized in March at [facility] for Bowel Obstruction and areas were red but not open when she was discharged but opened up after she returned home. States they have been using hydrogel with silver for a couple of months and that is was healing, but stalled, so saw Ms. Johnson last week. Culture was obtained which grew out Staph and pseudomonas. She had already been placed on Keflex and continues to take it and Ms. Johnson talked with Dr. Orellana and started her on gentamicin and bactroban ointment to use topically. She also has hx of kidney cancer with numerous bony metastases including spine and rib lesions. Her  said that oncology was aware of lesions but weren't r/t cancer and that the latest scan showed improvement.  Her last note from oncology dated 25 reads that radiology read her CT scan and saw significant progression on her cora lesiions , specifically T7 and L3, which are in close proximity to where her current wounds are. She continues with chemo: Opdivo with cabometryx monthly. Also xgeva for bony therapy. Note also says that referral would be made to radiation oncology for radiotherapy to T7 and L3 but this was not mentioned at visit and notes were revewed after patient left.     2025 Presents for wound care. Not much change in either  wound. Because of the way the bone is protruding out doubt it will cover without surgical intervention. Patient and  confirm that she had radiation to spine but her  says that those are not the places that received treatment. Will continue present therapy for now and see how healing progresses. Discussed need for Tino to help with healing. She goes back to oncologist, Dr. Harman, on 6/19 per patient hx. 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 on  and finishes 6/8/25. Denies any problems with it.    6/9/25 Areas look clean but not much change- Mrs. Brambila does not want to see surgeon for wounds. He got her some Tino to drink so she has been doing this 1-2 times a day and she feels like she has gained a little weight. She has an appointment next Monday with Dr. Harman so will return to wound clinic in 2 weeks.    6/23/25 Wounds remain stable.     6/30/35 Wound to thoracic spine with green drainage so cultured it today. Wound to lumbar spine still with thick tan slough covering area so unable to assess how deep area is but patient has been having a lot of pain to both areas and generalized pain all over as well and unable to tolerate debridement. Protruding bone is a problem but she does not want to even discuss surgical debridement.        Review of Systems     Review of Systems   Constitutional:  Positive for activity change, appetite change and fatigue.   HENT:  Negative for congestion, sneezing, sore throat and trouble swallowing.    Respiratory:  Negative for apnea, cough, chest tightness and shortness of breath.    Cardiovascular:  Negative for chest pain and leg swelling.   Gastrointestinal:  Negative for abdominal pain, nausea and vomiting.   Genitourinary:  Negative for dysuria, flank pain, hematuria and vaginal bleeding.   Skin:  Positive for wound. Negative for color change.   Neurological:  Positive for weakness. Negative for  dizziness and seizures.   Psychiatric/Behavioral:  Negative for confusion.        Medical / Social / Family History     Past Medical History:   Diagnosis Date    COPD (chronic obstructive pulmonary disease)     Coronary artery disease     Fibromyalgia     History of non-ST elevation myocardial infarction (NSTEMI)     4/2019      Hyperlipidemia     Hypertension        Past Surgical History:   Procedure Laterality Date    CARDIAC CATHETERIZATION      HERNIA REPAIR      HYSTERECTOMY      OOPHORECTOMY         Social History  Ms. Lorraine Brambila  reports that she quit smoking about 6 years ago. Her smoking use included cigarettes. She started smoking about 56 years ago. She has a 50 pack-year smoking history. She has never used smokeless tobacco. She reports that she does not drink alcohol and does not use drugs.    Family History  Ms.'effie Brambila family history includes Heart attack in her father; Heart disease in her father.    Medications and Allergies     Medications  No outpatient medications have been marked as taking for the 6/30/25 encounter (Office Visit) with Cande Nava FNP.       Allergies  Review of patient's allergies indicates:  No Known Allergies    Physical Examination     Vitals:    06/30/25 1321   BP: (!) 141/85   Pulse: 86   Resp: 18   Temp: 98.2 °F (36.8 °C)     Physical Exam  Constitutional:       Appearance: Normal appearance. She is ill-appearing.      Comments: Thin and frail 83 lbs   HENT:      Right Ear: External ear normal.      Left Ear: External ear normal.      Nose: Nose normal.      Mouth/Throat:      Mouth: Mucous membranes are moist.   Pulmonary:      Effort: Pulmonary effort is normal.   Musculoskeletal:      Right lower leg: No edema.      Left lower leg: No edema.      Comments: See LDA for details   Skin:     Comments: See LDA for details   Neurological:      Mental Status: She is alert and oriented to person, place, and time.      Motor: Weakness present.       Gait: Gait abnormal.   Psychiatric:         Mood and Affect: Mood normal.         Thought Content: Thought content normal.         Assessment and Plan             Wound 05/28/25 1342 Pressure Injury medial Thoracic spine #1 (Active)   05/28/25 1342 Thoracic spine   Present on Original Admission: Y   Primary Wound Type: Pressure inj   Side:    Orientation: medial   Wound Approximate Age at First Assessment (Weeks):    Wound Number: #1   Is this injury device related?: No   Incision Type:    Closure Method:    Wound Description (Comments):    Type:    Additional Comments:    Ankle-Brachial Index:    Pulses:    Removal Indication and Assessment: infection   Wound Outcome:    Wound Image    06/30/25 1326   Pressure Injury Stage 4 06/30/25 1326   Drainage Amount Moderate 06/30/25 1326   Drainage Characteristics/Odor Green;Purulent 06/30/25 1326   Tissue loss description Full thickness 06/30/25 1326   Wound Length (cm) 6.3 cm 06/30/25 1326   Wound Width (cm) 1.6 cm 06/30/25 1326   Wound Depth (cm) 0.4 cm 06/30/25 1326   Wound Volume (cm^3) 2.111 cm^3 06/30/25 1326   Wound Surface Area (cm^2) 7.92 cm^2 06/30/25 1326   Supply Surface Area (L x W) 10.08 sq cm 06/30/25 1326   Care Cleansed with:;Wound cleanser 06/30/25 1326   Dressing Applied;Hydrogel;Silver;Other (comment) 06/30/25 1326            Wound 05/28/25 1345 Pressure Injury lower;medial Lumbar spine #2 (Active)   05/28/25 1345 Lumbar spine   Present on Original Admission: Y   Primary Wound Type: Pressure inj   Side:    Orientation: lower;medial   Wound Approximate Age at First Assessment (Weeks):    Wound Number: #2   Is this injury device related?: No   Incision Type:    Closure Method:    Wound Description (Comments):    Type:    Additional Comments:    Ankle-Brachial Index:    Pulses:    Removal Indication and Assessment: infection   Wound Outcome:    Wound Image    06/30/25 1330   Pressure Injury Stage U 06/30/25 1330   Drainage Amount Moderate 06/30/25 1330    Drainage Characteristics/Odor Serosanguineous 06/30/25 1330   Wound Length (cm) 2 cm 06/30/25 1330   Wound Width (cm) 1.5 cm 06/30/25 1330   Wound Depth (cm) 0.1 cm 06/30/25 1330   Wound Volume (cm^3) 0.157 cm^3 06/30/25 1330   Wound Surface Area (cm^2) 2.36 cm^2 06/30/25 1330   Supply Surface Area (L x W) 3 sq cm 06/30/25 1330   Care Cleansed with:;Wound cleanser 06/30/25 1330   Dressing Applied;Foam 06/30/25 1330       Problem List Items Addressed This Visit       Decubitus ulcer of back, stage 4    Current Assessment & Plan   Wound care instructions for stage 4 decubitus (thoracic spine):  Clean wound with vashe and mix hydrogel with silver and hydrogel with collagen and apply to wound, cover with adaptic (be sure it covers bone), then apply foam border gauze.  Wound care to be done on Monday, Wednesday, and Friday and prn for soiling.     Home health to see patient 2x weekly and she will be seen once weekly at wound clinic     Nutrition  Supplement with daily multivitamin.  Nutritional supplement: Boost, Ensure, Tino     Hand Hygiene/Smoking Cessation  Wash hands before and after wound care. Call the Wound Center at [phone] if you have signs or symptoms of infection, increased drainage, increasing odor, or unusual redness. After Wound Care hours, please notify your PCP or go to the ER.          Relevant Orders    Culture, Tissue    Pressure injury of back, unstageable - Primary    Current Assessment & Plan   Daily wound care instructions: Clean with vashe and apply santyl nickel thick to lumbar spine decubitus, then cover with foam border dressing like mepilex.     Nutrition  Supplement with daily multivitamin.  Nutritional supplement: Boost, Ensure, Tino     Hand Hygiene/Smoking Cessation  Wash hands before and after wound care. Call the Wound Center at [phone] if you have signs or symptoms of infection, increased drainage, increasing odor, or unusual redness. After Wound Care hours, please notify  your PCP or go to the ER.               Future Appointments   Date Time Provider Department Center   7/7/2025 11:00 AM Cande Nava FNP [facility]   8/4/2025  1:30 PM Lucina Madison ACNP [facility]            Signature:  GIUSEPPE Robledo  [facility]  [address] [place], [place] [id number] [phone]    Date of encounter: 6/30/25

## 2025-07-01 NOTE — ASSESSMENT & PLAN NOTE
Daily wound care instructions: Clean with vashe and apply santyl nickel thick to lumbar spine decubitus, then cover with foam border dressing like mepilex.     Nutrition  Supplement with daily multivitamin.  Nutritional supplement: Boost, Ensure, Tino     Hand Hygiene/Smoking Cessation  Wash hands before and after wound care. Call the Wound Center at [phone] if you have signs or symptoms of infection, increased drainage, increasing odor, or unusual redness. After Wound Care hours, please notify your PCP or go to the ER.

## 2025-07-01 NOTE — ASSESSMENT & PLAN NOTE
Wound care instructions for stage 4 decubitus (thoracic spine):  Clean wound with vashe and mix hydrogel with silver and hydrogel with collagen and apply to wound, cover with adaptic (be sure it covers bone), then apply foam border gauze.  Wound care to be done on Monday, Wednesday, and Friday and prn for soiling.     Home health to see patient 2x weekly and she will be seen once weekly at wound clinic     Nutrition  Supplement with daily multivitamin.  Nutritional supplement: Boost, Ensure, Tino     Hand Hygiene/Smoking Cessation  Wash hands before and after wound care. Call the Wound Center at [phone] if you have signs or symptoms of infection, increased drainage, increasing odor, or unusual redness. After Wound Care hours, please notify your PCP or go to the ER.

## 2025-07-02 LAB
BACTERIA TISS AEROBE CULT: ABNORMAL
BACTERIA TISS AEROBE CULT: ABNORMAL

## 2025-07-03 ENCOUNTER — RESULTS FOLLOW-UP (OUTPATIENT)
Dept: WOUND CARE | Facility: HOSPITAL | Age: 70
End: 2025-07-03

## 2025-07-03 RX ORDER — CIPROFLOXACIN 500 MG/1
500 TABLET, FILM COATED ORAL EVERY 12 HOURS
Qty: 20 TABLET | Refills: 0 | Status: SHIPPED | OUTPATIENT
Start: 2025-07-03 | End: 2025-07-13

## 2025-07-07 ENCOUNTER — OFFICE VISIT (OUTPATIENT)
Dept: WOUND CARE | Facility: HOSPITAL | Age: 70
End: 2025-07-07
Attending: NURSE PRACTITIONER
Payer: MEDICARE

## 2025-07-07 VITALS
DIASTOLIC BLOOD PRESSURE: 60 MMHG | SYSTOLIC BLOOD PRESSURE: 95 MMHG | TEMPERATURE: 98 F | HEART RATE: 87 BPM | RESPIRATION RATE: 18 BRPM | OXYGEN SATURATION: 94 %

## 2025-07-07 DIAGNOSIS — L89.100 PRESSURE INJURY OF BACK, UNSTAGEABLE: ICD-10-CM

## 2025-07-07 DIAGNOSIS — L89.104 DECUBITUS ULCER OF BACK, STAGE 4: Primary | ICD-10-CM

## 2025-07-07 PROCEDURE — 99499 UNLISTED E&M SERVICE: CPT | Performed by: NURSE PRACTITIONER

## 2025-07-07 PROCEDURE — 11042 DBRDMT SUBQ TIS 1ST 20SQCM/<: CPT | Performed by: NURSE PRACTITIONER

## 2025-07-07 PROCEDURE — 11042 DBRDMT SUBQ TIS 1ST 20SQCM/<: CPT

## 2025-07-07 RX ORDER — LIDOCAINE HYDROCHLORIDE 20 MG/ML
JELLY TOPICAL
Qty: 30 ML | Refills: 0 | Status: SHIPPED | OUTPATIENT
Start: 2025-07-07

## 2025-07-07 NOTE — PATIENT INSTRUCTIONS
Decubitus ulcer of back, stage 4      Current Assessment & Plan   Wound care instructions for stage 4 decubitus (thoracic spine):  Clean wound with vashe and mix hydrogel with silver and hydrogel with collagen and apply to wound, cover with adaptic (be sure it covers bone), then apply foam border gauze.  Wound care to be done on Monday, Wednesday, and Friday and prn for soiling.     Home health to see patient 2x weekly and she will be seen once weekly at wound clinic     Nutrition  Supplement with daily multivitamin.  Nutritional supplement: Boost, Ensure, Tino     Hand Hygiene/Smoking Cessation  Wash hands before and after wound care. Call the Wound Center at [phone] if you have signs or symptoms of infection, increased drainage, increasing odor, or unusual redness. After Wound Care hours, please notify your PCP or go to the ER.            Relevant Orders     Culture, Tissue     Pressure injury of back, unstageable - Primary     Current Assessment & Plan   Daily wound care instructions: Clean with vashe and apply santyl nickel thick to lumbar spine decubitus, then cover with foam border dressing like mepilex.     Nutrition  Supplement with daily multivitamin.  Nutritional supplement: Boost, Ensure, Tino     Hand Hygiene/Smoking Cessation  Wash hands before and after wound care. Call the Wound Center at [phone] if you have signs or symptoms of infection, increased drainage, increasing odor, or unusual redness. After Wound Care hours, please notify your PCP or go to the ER.

## 2025-07-09 NOTE — ASSESSMENT & PLAN NOTE
Daily wound care instructions: Clean with vashe and apply santyl nickel thick to lumbar spine decubitus, then cover with foam border dressing like mepilex.     Nutrition  Supplement with daily multivitamin.  Nutritional supplement: Boost, Ensure, Tino     Hand Hygiene/Smoking Cessation  Wash hands before and after wound care. Call the Wound Center at [phone] if you have signs or symptoms of infection, increased drainage, increasing odor, or unusual redness. After Wound Care hours, please notify your PCP or go to the ER.

## 2025-07-09 NOTE — PROCEDURES
"Debridement    Date/Time: 7/7/2025 1:46 PM    Performed by: Cande Nava FNP  Authorized by: Cande Nava FNP  Associated wounds:        Wound 05/28/25 1345 Pressure Injury lower;medial Lumbar spine #2    Time out: Immediately prior to procedure a "time out" was called to verify the correct patient, procedure, equipment, support staff and site/side marked as required.    Consent Done?:  Yes (Verbal) and Yes (Written)  Local anesthesia used?: Yes    Local anesthetic:  Lidocaine 2% topical gel    Debridement - 1st Wound - General Location: lumbar spine.    Type of Debridement:  Non-excisional       Length (cm):  1.6       Width (cm):  1.4       Depth (cm):  0.1       Area (sq cm):  1.76       Percent Debrided (%):  100       Total Area Debrided (sq cm):  1.76    Depth of debridement:  Subcutaneous tissue    Tissue debrided:  Subcutaneous    Devitalized tissue debrided:  Slough and Necrotic/Eschar    Instruments:  Curette  Bleeding:  Minimal  Hemostasis Achieved: Yes  Method Used:  Pressure  Patient tolerance:  Patient tolerated the procedure well with no immediate complications  1st Wound Pain Assessment: 3  "

## 2025-07-09 NOTE — PROGRESS NOTES
Cande Nava, GIUSEPPE   [facility]  [address] [place], [place] [id number] [phone]      PATIENT NAME: Lorraine Brambila  : 1955  DATE: 25  MRN: [id number]      Billing Provider: GIUSEPPE Robledo  Level of Service: NO LOS  Patient PCP Information       Provider PCP Type    Alexsander Johnson DNP, FNP-C General            Reason for Visit / Chief Complaint: Non-healing Wound       History of Present Illness / Problem Focused Workflow     Lorraine Brambila is a 70 yo wf referred by MARYANN Johnson NP for pressure injuries x 2 to thoracic and lumbar spine. Patient states she was hospitalized in March at [place] for Bowel Obstruction and areas were red but not open when she was discharged but opened up after she returned home. States they have been using hydrogel with silver for a couple of months and that is was healing, but stalled, so saw Ms. Johnson last week. Culture was obtained which grew out Staph and pseudomonas. She had already been placed on Keflex and continues to take it and Ms. Johnson talked with Dr. Orellana and started her on gentamicin and bactroban ointment to use topically. She also has hx of kidney cancer with numerous bony metastases including spine and rib lesions. Her  said that oncology was aware of lesions but weren't r/t cancer and that the latest scan showed improvement.  Her last note from oncology dated 25 reads that radiology read her CT scan and saw significant progression on her cora lesiions , specifically T7 and L3, which are in close proximity to where her current wounds are. She continues with chemo: Opdivo with cabometryx monthly. Also xgeva for bony therapy. Note also says that referral would be made to radiation oncology for radiotherapy to T7 and L3 but this was not mentioned at visit and notes were revewed after patient left.     2025 Presents for wound care. Not much change in either  wound. Because of the way the bone is protruding out doubt it will cover without surgical intervention. Patient and  confirm that she had radiation to spine but her  says that those are not the places that received treatment. Will continue present therapy for now and see how healing progresses. Discussed need for Tino to help with healing. She goes back to oncologist, Dr. Harman, on 6/19 per patient hx. 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 Denies any problems with it.    6/9/25 Areas look clean but not much change- Mrs. Brambila does not want to see surgeon for wounds. He got her some Tino to drink so she has been doing this 1-2 times a day and she feels like she has gained a little weight. She has an appointment next Monday with Dr. Harman so will return to wound clinic in 2 weeks.    6/23/25 Wounds remain stable.     6/30/25 Wound to thoracic spine with green drainage so cultured it today. Wound to lumbar spine still with thick tan slough covering area so unable to assess how deep area is but patient has been having a lot of pain to both areas and generalized pain all over as well and unable to tolerate debridement. Protruding bone is a problem but she does not want to even discuss surgical debridement.    7/7/25 Culture from last week came back positive for pseudomonas and staph and both sensitive to cipro she has been on it since Thursday. There has not been any significant wound healing in either area - discussed that there were multiple reasons -- cancer primarily,  immunosuppression from chemo and steroids, nutrition, previous radiation, etc. And also the fact that the bone is protruding so far out and the tissue cannot cover it. I had mentioned surgical consult last week but I really don't think anything can be done surgically with all that she has going on and there is a large chance of making things  worse. Will attempt to contact Oncology Clinic and discuss case with them first. She has an appointment there on Monday for chemo.        Review of Systems     Review of Systems   Constitutional:  Positive for activity change and fatigue. Negative for appetite change.   HENT:  Negative for congestion, sneezing, sore throat and trouble swallowing.    Respiratory:  Negative for apnea, cough, chest tightness and shortness of breath.    Cardiovascular:  Negative for chest pain and leg swelling.   Gastrointestinal:  Negative for abdominal pain, nausea and vomiting.   Genitourinary:  Negative for dysuria, flank pain, hematuria and vaginal bleeding.   Skin:  Positive for wound. Negative for color change.   Neurological:  Positive for weakness. Negative for dizziness and seizures.   Psychiatric/Behavioral:  Negative for confusion.        Medical / Social / Family History     Past Medical History:   Diagnosis Date    COPD (chronic obstructive pulmonary disease)     Coronary artery disease     Fibromyalgia     History of non-ST elevation myocardial infarction (NSTEMI)     4/2019      Hyperlipidemia     Hypertension        Past Surgical History:   Procedure Laterality Date    CARDIAC CATHETERIZATION      HERNIA REPAIR      HYSTERECTOMY      OOPHORECTOMY         Social History  Ms. Lorraine Brambila  reports that she quit smoking about 6 years ago. Her smoking use included cigarettes. She started smoking about 56 years ago. She has a 50 pack-year smoking history. She has never used smokeless tobacco. She reports that she does not drink alcohol and does not use drugs.    Family History  Ms.'s Lorraine Brambila family history includes Heart attack in her father; Heart disease in her father.    Medications and Allergies     Medications  No outpatient medications have been marked as taking for the 7/7/25 encounter (Office Visit) with Cande Nava FNP.       Allergies  Review of patient's allergies indicates:  No Known  Allergies    Physical Examination     Vitals:    07/07/25 1321   BP: 95/60   Pulse: 87   Resp: 18   Temp: 97.7 °F (36.5 °C)     Physical Exam  Constitutional:       Appearance: Normal appearance. She is ill-appearing.      Comments: Thin and frail 83 lbs   HENT:      Right Ear: External ear normal.      Left Ear: External ear normal.      Nose: Nose normal.      Mouth/Throat:      Mouth: Mucous membranes are moist.   Pulmonary:      Effort: Pulmonary effort is normal.   Musculoskeletal:      Right lower leg: No edema.      Left lower leg: No edema.      Comments: See LDA for details   Skin:     Comments: See LDA for details   Neurological:      Mental Status: She is alert and oriented to person, place, and time.      Motor: Weakness present.      Gait: Gait abnormal.   Psychiatric:         Mood and Affect: Mood normal.         Thought Content: Thought content normal.         Assessment and Plan              Wound 05/28/25 1342 Pressure Injury medial Thoracic spine #1 (Active)   05/28/25 1342 Thoracic spine   Present on Original Admission: Y   Primary Wound Type: Pressure inj   Side:    Orientation: medial   Wound Approximate Age at First Assessment (Weeks):    Wound Number: #1   Is this injury device related?: No   Incision Type:    Closure Method:    Wound Description (Comments):    Type:    Additional Comments:    Ankle-Brachial Index:    Pulses:    Removal Indication and Assessment: infection   Wound Outcome:    Wound Image   07/07/25 1335   Pressure Injury Stage 4 07/07/25 1335   Drainage Amount Large 07/07/25 1335   Drainage Characteristics/Odor Purulent;Creamy 07/07/25 1335   Appearance Yellow;Pink 07/07/25 1335   Tissue loss description Full thickness 07/07/25 1335   Periwound Area Pale white 07/07/25 1335   Wound Edges Rolled/closed 07/07/25 1335   Wound Length (cm) 6 cm 07/07/25 1335   Wound Width (cm) 1.5 cm 07/07/25 1335   Wound Depth (cm) 0.6 cm 07/07/25 1335   Wound Volume (cm^3) 2.827 cm^3 07/07/25  1335   Wound Surface Area (cm^2) 7.07 cm^2 07/07/25 1335   Supply Surface Area (L x W) 9 sq cm 07/07/25 1335   Care Cleansed with:;Wound cleanser 07/07/25 1357   Dressing Applied 07/07/25 1357   Number of days: 42            Wound 05/28/25 1345 Pressure Injury lower;medial Lumbar spine #2 (Active)   05/28/25 1345 Lumbar spine   Present on Original Admission: Y   Primary Wound Type: Pressure inj   Side:    Orientation: lower;medial   Wound Approximate Age at First Assessment (Weeks):    Wound Number: #2   Is this injury device related?: No   Incision Type:    Closure Method:    Wound Description (Comments):    Type:    Additional Comments:    Ankle-Brachial Index:    Pulses:    Removal Indication and Assessment: infection   Wound Outcome:    Wound Image   07/07/25 1336   Drainage Amount Moderate 07/07/25 1336   Drainage Characteristics/Odor Serosanguineous 07/07/25 1336   Tissue loss description Full thickness 07/07/25 1336   Periwound Area Redness 07/07/25 1336   Wound Edges Defined 07/07/25 1336   Wound Length (cm) 1.6 cm 07/07/25 1336   Wound Width (cm) 1.4 cm 07/07/25 1336   Wound Depth (cm) 0.1 cm 07/07/25 1336   Wound Volume (cm^3) 0.117 cm^3 07/07/25 1336   Wound Surface Area (cm^2) 1.76 cm^2 07/07/25 1336   Supply Surface Area (L x W) 2.24 sq cm 07/07/25 1336   Care Cleansed with:;Wound cleanser 07/07/25 1357   Dressing Applied 07/07/25 1357   Number of days: 42      Problem List Items Addressed This Visit       Decubitus ulcer of back, stage 4 - Primary    Current Assessment & Plan   Wound care instructions for stage 4 decubitus (thoracic spine):  Clean wound with vashe. Apply medihoney gel to wound including gentle application of small amount underneath periwound  to areas of undermining.Cover with adaptic (be sure it covers bone), then apply foam border gauze.  Wound care to be done on Monday, Wednesday, and Friday and more often prn for soiling or discomfort.      Home health to see patient 2x weekly and  she will be seen once weekly at wound clinic     Nutrition  Supplement with daily multivitamin.  Nutritional supplement: Boost, Ensure, Tino     Hand Hygiene/Smoking Cessation  Wash hands before and after wound care. Call the Wound Center at [phone] if you have signs or symptoms of infection, increased drainage, increasing odor, or unusual redness. After Wound Care hours, please notify your PCP or go to the ER.          Pressure injury of back, unstageable    Current Assessment & Plan   Daily wound care instructions: Clean with vashe and apply santyl nickel thick to lumbar spine decubitus, then cover with foam border dressing like mepilex.     Nutrition  Supplement with daily multivitamin.  Nutritional supplement: Boost, Ensure, Tino     Hand Hygiene/Smoking Cessation  Wash hands before and after wound care. Call the Wound Center at [phone] if you have signs or symptoms of infection, increased drainage, increasing odor, or unusual redness. After Wound Care hours, please notify your PCP or go to the ER.          Relevant Orders    Debridement         Future Appointments   Date Time Provider Department Center   7/14/2025  1:00 PM Cande Nava FNP [facility] [place]   8/4/2025  1:30 PM Lucina Madison ACNP [facility] CARD [facility]            Signature:  GIUSEPPE Robledo  [facility]  [address] [place] [address]  [phone]    Date of encounter: 7/7/25

## 2025-07-09 NOTE — ASSESSMENT & PLAN NOTE
Wound care instructions for stage 4 decubitus (thoracic spine):  Clean wound with vashe. Apply medihoney gel to wound including gentle application of small amount underneath periwound  to areas of undermining.Cover with adaptic (be sure it covers bone), then apply foam border gauze.  Wound care to be done on Monday, Wednesday, and Friday and more often prn for soiling or discomfort.      Home health to see patient 2x weekly and she will be seen once weekly at wound clinic     Nutrition  Supplement with daily multivitamin.  Nutritional supplement: Boost, Ensure, Tino     Hand Hygiene/Smoking Cessation  Wash hands before and after wound care. Call the Wound Center at [phone] if you have signs or symptoms of infection, increased drainage, increasing odor, or unusual redness. After Wound Care hours, please notify your PCP or go to the ER.

## 2025-07-21 ENCOUNTER — OFFICE VISIT (OUTPATIENT)
Dept: WOUND CARE | Facility: HOSPITAL | Age: 70
End: 2025-07-21
Attending: NURSE PRACTITIONER
Payer: MEDICARE

## 2025-07-21 DIAGNOSIS — L89.100 PRESSURE INJURY OF BACK, UNSTAGEABLE: ICD-10-CM

## 2025-07-21 DIAGNOSIS — L89.104 DECUBITUS ULCER OF BACK, STAGE 4: Primary | ICD-10-CM

## 2025-07-21 PROCEDURE — 99213 OFFICE O/P EST LOW 20 MIN: CPT | Performed by: NURSE PRACTITIONER

## 2025-07-21 NOTE — PATIENT INSTRUCTIONS
Decubitus ulcer of back, stage 4 - Primary      Current Assessment & Plan   Wound care instructions for stage 4 decubitus (thoracic spine):  Clean wound with vashe. Apply medihoney gel to wound including gentle application of small amount underneath periwound  to areas of undermining.Cover with adaptic (be sure it covers bone), then apply foam border gauze.  Wound care to be done on Monday, Wednesday, and Friday and more often prn for soiling or discomfort.      Home health to see patient 2x weekly and she will be seen once weekly at wound clinic     Nutrition  Supplement with daily multivitamin.  Nutritional supplement: Boost, Ensure, Tino     Hand Hygiene/Smoking Cessation  Wash hands before and after wound care. Call the Wound Center at [phone] if you have signs or symptoms of infection, increased drainage, increasing odor, or unusual redness. After Wound Care hours, please notify your PCP or go to the ER.            Pressure injury of back, unstageable     Current Assessment & Plan   Daily wound care instructions: Clean with vashe and apply santyl nickel thick to lumbar spine decubitus, then cover with foam border dressing like mepilex.     Nutrition  Supplement with daily multivitamin.  Nutritional supplement: Boost, Ensure, Tino     Hand Hygiene/Smoking Cessation  Wash hands before and after wound care. Call the Wound Center at [phone] if you have signs or symptoms of infection, increased drainage, increasing odor, or unusual redness. After Wound Care hours, please notify your PCP or go to the ER.

## 2025-07-23 NOTE — PROGRESS NOTES
GIUSEPPE Robledo   [facility]  [address] [place], [place] [id number] [phone]      PATIENT NAME: Lorraine Brambila  : 1955  DATE: 25  MRN: [id number]      Billing Provider: GIUSEPPE Robledo  Level of Service:   Patient PCP Information       Provider PCP Type    Alexsander Johnson DNP, FNP-C General            Reason for Visit / Chief Complaint: Non-healing Wound       History of Present Illness / Problem Focused Workflow     Lorraine Brambila is a 68 yo wf referred by MARYANN Johnson NP for pressure injuries x 2 to thoracic and lumbar spine. Patient states she was hospitalized in March at [facility] for Bowel Obstruction and areas were red but not open when she was discharged but opened up after she returned home. States they have been using hydrogel with silver for a couple of months and that is was healing, but stalled, so saw Ms. Johnson last week. Culture was obtained which grew out Staph and pseudomonas. She had already been placed on Keflex and continues to take it and Ms. Johnson talked with Dr. Orellana and started her on gentamicin and bactroban ointment to use topically. She also has hx of kidney cancer with numerous bony metastases including spine and rib lesions. Her  said that oncology was aware of lesions but weren't r/t cancer and that the latest scan showed improvement.  Her last note from oncology dated 25 reads that radiology read her CT scan and saw significant progression on her cora lesiions , specifically T7 and L3, which are in close proximity to where her current wounds are. She continues with chemo: Opdivo with cabometryx monthly. Also xgeva for bony therapy. Note also says that referral would be made to radiation oncology for radiotherapy to T7 and L3 but this was not mentioned at visit and notes were revewed after patient left.     2025 Presents for wound care. Not much change in either  wound. Because of the way the bone is protruding out doubt it will cover without surgical intervention. Patient and  confirm that she had radiation to spine but her  says that those are not the places that received treatment. Will continue present therapy for now and see how healing progresses. Discussed need for Tino to help with healing. She goes back to oncologist, Dr. Harman, on 6/19 per patient hx. 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 Denies any problems with it.    6/9/25 Areas look clean but not much change- Mrs. Brambila does not want to see surgeon for wounds. He got her some Tino to drink so she has been doing this 1-2 times a day and she feels like she has gained a little weight. She has an appointment next Monday with Dr. Harman so will return to wound clinic in 2 weeks.    6/23/25 Wounds remain stable.     6/30/25 Wound to thoracic spine with green drainage so cultured it today. Wound to lumbar spine still with thick tan slough covering area so unable to assess how deep area is but patient has been having a lot of pain to both areas and generalized pain all over as well and unable to tolerate debridement. Protruding bone is a problem but she does not want to even discuss surgical debridement.    7/7/25 Culture from last week came back positive for pseudomonas and staph and both sensitive to cipro she has been on it since Thursday. There has not been any significant wound healing in either area - discussed that there were multiple reasons -- cancer primarily,  immunosuppression from chemo and steroids, nutrition, previous radiation, etc. And also the fact that the bone is protruding so far out and the tissue cannot cover it. I had mentioned surgical consult last week but I really don't think anything can be done surgically with all that she has going on and there is a large chance of making things  worse. Will attempt to contact Oncology Clinic and discuss case with them first. She has an appointment there on Monday for chemo.    7/21/25 Wound looks better this week with no further s/s of infection.Lumbar wound still covered with slough despite santyl but she cannot tolerate debridement. Both wounds are extremely painful. Will continue present treatment and continue with home health. Any healing is going to be very slow because of the cancer, the chemo, radiation to that area, steroids, nutrition status, and debility in general. Discussed this with   and  Mr. Brambila and discussed that while we couldn't change most of the factors we can but discussed that we would continue to do everything thing we could to make conditions as favorable as possible to promote healing and we would also continue to monitor closely for infection or any other complications.        Review of Systems     Review of Systems   Constitutional:  Positive for activity change and fatigue. Negative for appetite change.   HENT:  Negative for congestion, sneezing, sore throat and trouble swallowing.    Respiratory:  Negative for apnea, cough, chest tightness and shortness of breath.    Cardiovascular:  Negative for chest pain and leg swelling.   Gastrointestinal:  Negative for abdominal pain, nausea and vomiting.   Genitourinary:  Negative for dysuria, flank pain, hematuria and vaginal bleeding.   Skin:  Positive for wound. Negative for color change.   Neurological:  Positive for weakness. Negative for dizziness and seizures.   Psychiatric/Behavioral:  Negative for confusion.        Medical / Social / Family History     Past Medical History:   Diagnosis Date    COPD (chronic obstructive pulmonary disease)     Coronary artery disease     Fibromyalgia     History of non-ST elevation myocardial infarction (NSTEMI)     4/2019      Hyperlipidemia     Hypertension        Past Surgical History:   Procedure Laterality Date    CARDIAC  CATHETERIZATION      HERNIA REPAIR      HYSTERECTOMY      OOPHORECTOMY         Social History  Ms. Lorraine Brambila  reports that she quit smoking about 6 years ago. Her smoking use included cigarettes. She started smoking about 56 years ago. She has a 50 pack-year smoking history. She has never used smokeless tobacco. She reports that she does not drink alcohol and does not use drugs.    Family History  Ms.'s Lorraine Brambila family history includes Heart attack in her father; Heart disease in her father.    Medications and Allergies     Medications  No outpatient medications have been marked as taking for the 7/21/25 encounter (Office Visit) with Cande Nava FNP.       Allergies  Review of patient's allergies indicates:  No Known Allergies    Physical Examination     There were no vitals filed for this visit.    Physical Exam  Constitutional:       Appearance: Normal appearance. She is ill-appearing.      Comments: Thin and frail 83 lbs   HENT:      Right Ear: External ear normal.      Left Ear: External ear normal.      Nose: Nose normal.      Mouth/Throat:      Mouth: Mucous membranes are moist.   Pulmonary:      Effort: Pulmonary effort is normal.   Musculoskeletal:      Right lower leg: No edema.      Left lower leg: No edema.      Comments: See LDA for details   Skin:     Comments: See LDA for details   Neurological:      Mental Status: She is alert and oriented to person, place, and time.      Motor: Weakness present.      Gait: Gait abnormal.   Psychiatric:         Mood and Affect: Mood normal.         Thought Content: Thought content normal.         Assessment and Plan               Wound 05/28/25 1342 Pressure Injury medial Thoracic spine #1 (Active)   05/28/25 1342 Thoracic spine   Present on Original Admission: Y   Primary Wound Type: Pressure inj   Side:    Orientation: medial   Wound Approximate Age at First Assessment (Weeks):    Wound Number: #1   Is this injury device related?: No    Incision Type:    Closure Method:    Wound Description (Comments):    Type:    Additional Comments:    Ankle-Brachial Index:    Pulses:    Removal Indication and Assessment: infection   Wound Outcome:    Wound Image   07/21/25 1437   Pressure Injury Stage 4 07/21/25 1437   Drainage Amount Large 07/21/25 1354   Drainage Characteristics/Odor Serosanguineous;Green;Yellow 07/21/25 1354   Wound Length (cm) 6.1 cm 07/21/25 1342   Wound Width (cm) 1.9 cm 07/21/25 1342   Wound Depth (cm) 0.5 cm 07/21/25 1342   Wound Volume (cm^3) 3.034 cm^3 07/21/25 1342   Wound Surface Area (cm^2) 9.1 cm^2 07/21/25 1342   Supply Surface Area (L x W) 11.59 sq cm 07/21/25 1342   Care Cleansed with:;Wound cleanser 07/21/25 1437   Dressing Applied;Silver 07/21/25 1437   Number of days: 56            Wound 05/28/25 1345 Pressure Injury lower;medial Lumbar spine #2 (Active)   05/28/25 1345 Lumbar spine   Present on Original Admission: Y   Primary Wound Type: Pressure inj   Side:    Orientation: lower;medial   Wound Approximate Age at First Assessment (Weeks):    Wound Number: #2   Is this injury device related?: No   Incision Type:    Closure Method:    Wound Description (Comments):    Type:    Additional Comments:    Ankle-Brachial Index:    Pulses:    Removal Indication and Assessment: infection   Wound Outcome:    Wound Image   07/21/25 1438   Pressure Injury Stage U 07/21/25 1355   Drainage Amount Moderate 07/21/25 1355   Drainage Characteristics/Odor Serosanguineous 07/21/25 1355   Yellow (%), Wound Tissue Color 100 % 07/21/25 1355   Wound Length (cm) 1.1 cm 07/21/25 1343   Wound Width (cm) 1.4 cm 07/21/25 1343   Wound Depth (cm) 0.1 cm 07/21/25 1343   Wound Volume (cm^3) 0.081 cm^3 07/21/25 1343   Wound Surface Area (cm^2) 1.21 cm^2 07/21/25 1343   Supply Surface Area (L x W) 1.54 sq cm 07/21/25 1343   Care Cleansed with:;Wound cleanser 07/21/25 1438   Number of days: 56         Problem List Items Addressed This Visit       Decubitus  ulcer of back, stage 4 - Primary    Current Assessment & Plan   Wound care instructions for stage 4 decubitus (thoracic spine):  Clean wound with vashe. Apply medihoney gel to wound including gentle application of small amount underneath periwound  to areas of undermining.Cover with adaptic (be sure it covers bone), then apply foam border gauze.  Wound care to be done on Monday, Wednesday, and Friday and more often prn for soiling or discomfort.      Home health to see patient 2x weekly and she will be seen once weekly at wound clinic     Nutrition  Supplement with daily multivitamin.  Nutritional supplement: Boost, Ensure, Tino     Hand Hygiene/Smoking Cessation  Wash hands before and after wound care. Call the Wound Center at [phone] if you have signs or symptoms of infection, increased drainage, increasing odor, or unusual redness. After Wound Care hours, please notify your PCP or go to the ER.          Pressure injury of back, unstageable    Current Assessment & Plan   Daily wound care instructions: Clean with vashe and apply santyl nickel thick to lumbar spine decubitus, then cover with foam border dressing like mepilex.     Nutrition  Supplement with daily multivitamin.  Nutritional supplement: Boost, Ensure, Tino     Hand Hygiene/Smoking Cessation  Wash hands before and after wound care. Call the Wound Center at [phone] if you have signs or symptoms of infection, increased drainage, increasing odor, or unusual redness. After Wound Care hours, please notify your PCP or go to the ER.               Future Appointments   Date Time Provider Department Center   7/28/2025  1:00 PM Cande Nava FNP [facility]   8/4/2025  1:30 PM [facility]            Signature:  GIUSEPPE Robledo  [facility]  [address] [place] [address]  [phone]    Date of encounter: 7/21/25

## 2025-07-23 NOTE — ASSESSMENT & PLAN NOTE
Daily wound care instructions: Clean with vashe and apply santyl nickel thick to lumbar spine decubitus, then cover with foam border dressing like mepilex.     Nutrition  Supplement with daily multivitamin.  Nutritional supplement: Boost, Ensure, Tino     Hand Hygiene/Smoking Cessation  Wash hands before and after wound care. Call the Wound Center at [phone] if you have signs or symptoms of infection, increased drainage, increasing odor, or unusual redness. After Wound Care hours, please notify your PCP or go to the ER.

## 2025-07-23 NOTE — ASSESSMENT & PLAN NOTE
Wound care instructions for stage 4 decubitus (thoracic spine):  Clean wound with vashe. Apply medihoney gel to wound including gentle application of small amount underneath periwound  to areas of undermining.Cover with adaptic (be sure it covers bone), then apply foam border gauze.  Wound care to be done on Monday, Wednesday, and Friday and more often prn for soiling or discomfort.      Home health to see patient 2x weekly and she will be seen once weekly at wound clinic     Nutrition  Supplement with daily multivitamin.  Nutritional supplement: Boost, Ensure, Tino     Hand Hygiene/Smoking Cessation  Wash hands before and after wound care. Call the Wound Center at [phone] if you have signs or symptoms of infection, increased drainage, increasing odor, or unusual redness. After Wound Care hours, please notify your PCP or go to the ER.

## 2025-07-28 ENCOUNTER — OFFICE VISIT (OUTPATIENT)
Dept: WOUND CARE | Facility: HOSPITAL | Age: 70
End: 2025-07-28
Attending: NURSE PRACTITIONER
Payer: MEDICARE

## 2025-07-28 DIAGNOSIS — L89.100 PRESSURE INJURY OF BACK, UNSTAGEABLE: ICD-10-CM

## 2025-07-28 DIAGNOSIS — L08.9 WOUND INFECTION: ICD-10-CM

## 2025-07-28 DIAGNOSIS — L89.104 DECUBITUS ULCER OF BACK, STAGE 4: Primary | ICD-10-CM

## 2025-07-28 DIAGNOSIS — T14.8XXA WOUND INFECTION: ICD-10-CM

## 2025-07-28 PROCEDURE — 11042 DBRDMT SUBQ TIS 1ST 20SQCM/<: CPT

## 2025-07-28 PROCEDURE — 99213 OFFICE O/P EST LOW 20 MIN: CPT | Performed by: NURSE PRACTITIONER

## 2025-07-28 RX ORDER — CIPROFLOXACIN 250 MG/1
250 TABLET, FILM COATED ORAL 2 TIMES DAILY
Qty: 28 TABLET | Refills: 0 | Status: SHIPPED | OUTPATIENT
Start: 2025-07-28

## 2025-07-28 NOTE — PATIENT INSTRUCTIONS
Decubitus ulcer of back, stage 4 - Primary      Current Assessment & Plan   Wound care instructions for stage 4 decubitus (thoracic spine):  Clean wound with vashe. Apply medihoney gel to wound including gentle application of small amount underneath periwound  to areas of undermining.Cover with adaptic (be sure it covers bone), then apply foam border gauze.  Wound care to be done on Monday, Wednesday, and Friday and more often prn for soiling or discomfort.      Home health to see patient 2x weekly and she will be seen once weekly at wound clinic     Nutrition  Supplement with daily multivitamin.  Nutritional supplement: Boost, Ensure, Tino     Hand Hygiene/Smoking Cessation  Wash hands before and after wound care. Call the Wound Center at [phone] if you have signs or symptoms of infection, increased drainage, increasing odor, or unusual redness. After Wound Care hours, please notify your PCP or go to the ER.            Pressure injury of back, unstageable     Current Assessment & Plan   Daily wound care instructions: Clean with vashe and apply santyl nickel thick to lumbar spine decubitus, then cover with foam border dressing like mepilex.     Nutrition  Supplement with daily multivitamin.  Nutritional supplement: Boost, Ensure, Tino     Hand Hygiene/Smoking Cessation  Wash hands before and after wound care. Call the Wound Center at [phone] if you have signs or symptoms of infection, increased drainage, increasing odor, or unusual redness. After Wound Care hours, please notify your PCP or go to the ER.

## 2025-08-03 NOTE — ASSESSMENT & PLAN NOTE
Daily wound care instructions: Clean with vashe and apply santyl nickel thick to lumbar spine decubitus, then cover with foam border dressing like mepilex.     Nutrition  Supplement with daily multivitamin.  Nutritional supplement: Boost, Ensure, Tino     Hand Hygiene/Smoking Cessation  Wash hands before and after wound care. Call the Wound Center at [phone] if you have signs or symptoms of infection, increased drainage, increasing odor, or unusual redness. After Wound Care hours, please notify your PCP or go to the ER.

## 2025-08-03 NOTE — ASSESSMENT & PLAN NOTE
Wound care instructions for stage 4 decubitus (thoracic spine):  Clean wound with vashe. Apply medihoney gel to wound including gentle application of small amount underneath periwound  to areas of undermining.Cover with adaptic (be sure it covers bone), then apply foam border gauze.  Wound care to be done on Monday, Wednesday, and Friday and more often prn for soiling or discomfort.      Home health to see patient 2x weekly and she will be seen once weekly at wound clinic     Nutrition  Supplement with daily multivitamin.  Nutritional supplement: Boost, Ensure, Tino     Hand Hygiene/Smoking Cessation  Wash hands before and after wound care. Call the Wound Center at [phone] if you have signs or symptoms of infection, increased drainage, increasing odor, or unusual redness. After Wound Care hours, please notify your PCP or go to the ER.

## 2025-08-03 NOTE — ASSESSMENT & PLAN NOTE
Wound care instructions for stage 4 decubitus (thoracic spine):  Clean wound with vashe. Apply medihoney gel to wound including gentle application of small amount underneath periwound  to areas of undermining.Cover with adaptic (be sure it covers bone), then apply foam border gauze.  Wound care to be done on Monday, Wednesday, and Friday and more often prn for soiling or discomfort.      Home health to see patient 2x weekly and she will be seen once weekly at wound clinic     Nutrition  Supplement with daily multivitamin.  Nutritional supplement: Boost, Ensure, Tino     Hand Hygiene/Smoking Cessation  Wash hands before and after wound care. Call the Wound Center at [phone] if you have signs or symptoms of infection, increased drainage, increasing odor, or unusual redness. After Wound Care hours, please notify your PCP or go to the ER.

## 2025-08-03 NOTE — PROGRESS NOTES
Cande Nava, GIUSEPPE   [facility]  [address] [place] [address]  [phone]      PATIENT NAME: Lorraine Brambila  : 1955  DATE: 25  MRN: [id number]      Billing Provider: GIUSEPPE Robledo  Level of Service: AK OFFICE/OUTPT VISIT, EST, LEVL III, 20-29 MIN  Patient PCP Information       Provider PCP Type    Alexsander Johnson DNP, FNP-C General            Reason for Visit / Chief Complaint: Wound Check       History of Present Illness / Problem Focused Workflow     Lorraine Brambila is a 68 yo wf referred by MARYANN Johnson NP for pressure injuries x 2 to thoracic and lumbar spine. Patient states she was hospitalized in March at [place] for Bowel Obstruction and areas were red but not open when she was discharged but opened up after she returned home. States they have been using hydrogel with silver for a couple of months and that is was healing, but stalled, so saw Ms. Johnson last week. Culture was obtained which grew out Staph and pseudomonas. She had already been placed on Keflex and continues to take it and Ms. Johnson talked with Dr. Orellana and started her on gentamicin and bactroban ointment to use topically. She also has hx of kidney cancer with numerous bony metastases including spine and rib lesions. Her  said that oncology was aware of lesions but weren't r/t cancer and that the latest scan showed improvement.  Her last note from oncology dated 25 reads that radiology read her CT scan and saw significant progression on her cora lesiions , specifically T7 and L3, which are in close proximity to where her current wounds are. She continues with chemo: Opdivo with cabometryx monthly. Also xgeva for bony therapy. Note also says that referral would be made to radiation oncology for radiotherapy to T7 and L3 but this was not mentioned at visit and notes were revewed after patient left.     2025 Presents for  wound care. Not much change in either wound. Because of the way the bone is protruding out doubt it will cover without surgical intervention. Patient and  confirm that she had radiation to spine but her  says that those are not the places that received treatment. Will continue present therapy for now and see how healing progresses. Discussed need for Tino to help with healing. She goes back to oncologist, Dr. Harman, on 6/19 per patient hx. 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 Denies any problems with it.    6/9/25 Areas look clean but not much change- Mrs. Brambila does not want to see surgeon for wounds. He got her some Tino to drink so she has been doing this 1-2 times a day and she feels like she has gained a little weight. She has an appointment next Monday with Dr. Harman so will return to wound clinic in 2 weeks.    6/23/25 Wounds remain stable.     6/30/25 Wound to thoracic spine with green drainage so cultured it today. Wound to lumbar spine still with thick tan slough covering area so unable to assess how deep area is but patient has been having a lot of pain to both areas and generalized pain all over as well and unable to tolerate debridement. Protruding bone is a problem but she does not want to even discuss surgical debridement.    7/7/25 Culture from last week came back positive for pseudomonas and staph and both sensitive to cipro she has been on it since Thursday. There has not been any significant wound healing in either area - discussed that there were multiple reasons -- cancer primarily,  immunosuppression from chemo and steroids, nutrition, previous radiation, etc. And also the fact that the bone is protruding so far out and the tissue cannot cover it. I had mentioned surgical consult last week but I really don't think anything can be done surgically with all that she has going on and there is  a large chance of making things worse. Will attempt to contact Oncology Clinic and discuss case with them first. She has an appointment there on Monday for chemo.    7/21/25 Wound looks better this week with no further s/s of infection.Lumbar wound still covered with slough despite santyl but she cannot tolerate debridement. Both wounds are extremely painful. Will continue present treatment and continue with home health. Any healing is going to be very slow because of the cancer, the chemo, radiation to that area, steroids, nutrition status, and debility in general. Discussed this with   and  Mr. Brambila and discussed that while we couldn't change most of the factors we can but discussed that we would continue to do everything thing we could to make conditions as favorable as possible to promote healing and we would also continue to monitor closely for infection or any other complications.    7/28/25  Patient c/o increased amount of pain to thoracic area/ wound. She does have some thick tan/ perhaps greenish discharge this week. Will go ahead and rx with cipro again  -- she said it upset her stomach some last time so will try decreasing the dose to 250 bid but increasing from 10 to 14 days and see if she tolerates it better. Encouraged her to talk with her oncologist about possibility of increasing her pain meds. She takes norco 5 mg prn and wears a duragesic patch but states she has been on the same dose for a while so hopefully he can tweak her meds to provide better relief without causing excessive sedation.        Review of Systems     Review of Systems   Constitutional:  Positive for activity change and fatigue. Negative for appetite change.   HENT:  Negative for congestion, sneezing, sore throat and trouble swallowing.    Respiratory:  Negative for apnea, cough, chest tightness and shortness of breath.    Cardiovascular:  Negative for chest pain and leg swelling.   Gastrointestinal:  Negative for abdominal  pain, nausea and vomiting.   Genitourinary:  Negative for dysuria, flank pain, hematuria and vaginal bleeding.   Skin:  Positive for wound. Negative for color change.   Neurological:  Positive for weakness. Negative for dizziness and seizures.   Psychiatric/Behavioral:  Negative for confusion.        Medical / Social / Family History     Past Medical History:   Diagnosis Date    COPD (chronic obstructive pulmonary disease)     Coronary artery disease     Fibromyalgia     History of non-ST elevation myocardial infarction (NSTEMI)     4/2019      Hyperlipidemia     Hypertension        Past Surgical History:   Procedure Laterality Date    CARDIAC CATHETERIZATION      HERNIA REPAIR      HYSTERECTOMY      OOPHORECTOMY         Social History  Ms. Lorraine Brambila  reports that she quit smoking about 6 years ago. Her smoking use included cigarettes. She started smoking about 56 years ago. She has a 50 pack-year smoking history. She has never used smokeless tobacco. She reports that she does not drink alcohol and does not use drugs.    Family History  Ms.'s Lorraine Brambila family history includes Heart attack in her father; Heart disease in her father.    Medications and Allergies     Medications  No outpatient medications have been marked as taking for the 7/28/25 encounter (Office Visit) with Cande Nava FNP.       Allergies  Review of patient's allergies indicates:  No Known Allergies    Physical Examination     There were no vitals filed for this visit.    Physical Exam  Constitutional:       Appearance: Normal appearance. She is ill-appearing.      Comments: Thin and frail 83 lbs   HENT:      Right Ear: External ear normal.      Left Ear: External ear normal.      Nose: Nose normal.      Mouth/Throat:      Mouth: Mucous membranes are moist.   Pulmonary:      Effort: Pulmonary effort is normal.   Musculoskeletal:      Right lower leg: No edema.      Left lower leg: No edema.      Comments: See LDA for  details   Skin:     Comments: See LDA for details   Neurological:      Mental Status: She is alert and oriented to person, place, and time.      Motor: Weakness present.      Gait: Gait abnormal.   Psychiatric:         Mood and Affect: Mood normal.         Thought Content: Thought content normal.         Assessment and Plan               Wound 05/28/25 1342 Pressure Injury medial Thoracic spine #1 (Active)   05/28/25 1342 Thoracic spine   Present on Original Admission: Y   Primary Wound Type: Pressure inj   Side:    Orientation: medial   Wound Approximate Age at First Assessment (Weeks):    Wound Number: #1   Is this injury device related?: No   Incision Type:    Closure Method:    Wound Description (Comments):    Type:    Additional Comments:    Ankle-Brachial Index:    Pulses:    Removal Indication and Assessment: infection   Wound Outcome:    Wound Image   07/28/25 1504   Drainage Amount Large 07/28/25 1412   Drainage Characteristics/Odor Creamy;Tan 07/28/25 1412   Periwound Area Redness 07/28/25 1412   Wound Edges Defined 07/28/25 1412   Wound Length (cm) 6.5 cm 07/28/25 1412   Wound Width (cm) 3.9 cm 07/28/25 1412   Wound Depth (cm) 0.3 cm 07/28/25 1412   Wound Volume (cm^3) 3.982 cm^3 07/28/25 1412   Wound Surface Area (cm^2) 19.91 cm^2 07/28/25 1412   Supply Surface Area (L x W) 25.35 sq cm 07/28/25 1412   Care Cleansed with:;Wound cleanser 07/28/25 1504   Dressing Honey 07/28/25 1504   Number of days: 67            Wound 05/28/25 1345 Pressure Injury lower;medial Lumbar spine #2 (Active)   05/28/25 1345 Lumbar spine   Present on Original Admission: Y   Primary Wound Type: Pressure inj   Side:    Orientation: lower;medial   Wound Approximate Age at First Assessment (Weeks):    Wound Number: #2   Is this injury device related?: No   Incision Type:    Closure Method:    Wound Description (Comments):    Type:    Additional Comments:    Ankle-Brachial Index:    Pulses:    Removal Indication and Assessment:  infection   Wound Outcome:    Wound Image   07/28/25 1504   Pressure Injury Stage U 07/28/25 1414   Drainage Amount Moderate 07/28/25 1414   Drainage Characteristics/Odor Serosanguineous 07/28/25 1414   Periwound Area Redness 07/28/25 1414   Wound Edges Defined 07/28/25 1414   Wound Length (cm) 1.5 cm 07/28/25 1414   Wound Width (cm) 1.3 cm 07/28/25 1414   Wound Depth (cm) 0.1 cm 07/28/25 1414   Wound Volume (cm^3) 0.102 cm^3 07/28/25 1414   Wound Surface Area (cm^2) 1.53 cm^2 07/28/25 1414   Supply Surface Area (L x W) 1.95 sq cm 07/28/25 1414   Care Cleansed with:;Wound cleanser 07/28/25 1504   Dressing Foam 07/28/25 1504   Number of days: 67       Problem List Items Addressed This Visit       Decubitus ulcer of back, stage 4 - Primary    Current Assessment & Plan   Wound care instructions for stage 4 decubitus (thoracic spine):  Clean wound with vashe. Apply medihoney gel to wound including gentle application of small amount underneath periwound  to areas of undermining.Cover with adaptic (be sure it covers bone), then apply foam border gauze.  Wound care to be done on Monday, Wednesday, and Friday and more often prn for soiling or discomfort.      Home health to see patient 2x weekly and she will be seen once weekly at wound clinic     Nutrition  Supplement with daily multivitamin.  Nutritional supplement: Boost, Ensure, Tino     Hand Hygiene/Smoking Cessation  Wash hands before and after wound care. Call the Wound Center at [phone] if you have signs or symptoms of infection, increased drainage, increasing odor, or unusual redness. After Wound Care hours, please notify your PCP or go to the ER.          Pressure injury of back, unstageable    Current Assessment & Plan   7/28/25  Daily wound care instructions: Clean with vashe and apply santyl nickel thick to lumbar spine decubitus, then cover with foam border dressing like mepilex.     Nutrition  Supplement with daily multivitamin.  Nutritional supplement:  Boost, Ensure, Tino     Hand Hygiene/Smoking Cessation  Wash hands before and after wound care. Call the Wound Center at [phone] if you have signs or symptoms of infection, increased drainage, increasing odor, or unusual redness. After Wound Care hours, please notify your PCP or go to the ER.           Other Visit Diagnoses         Wound infection        Relevant Medications    ciprofloxacin HCl (CIPRO) 250 MG tablet              Future Appointments   Date Time Provider Department Center   8/4/2025  1:00 PM Cande Nava FNP [facility]            Signature:  GIUSEPPE Robledo  [facility]  [address] [place] [address]  [phone]    Date of encounter: 7/28/25

## 2025-08-03 NOTE — PROGRESS NOTES
Cande Nava, GIUSEPPE   [facility]  [address] [place] [address]  [phone]      PATIENT NAME: Lorraine Brambila  : 1955  DATE: 25  MRN: [id number]      Billing Provider: GIUSEPPE Rolbedo  Level of Service: DC OFFICE/OUTPT VISIT, EST, LEVL III, 20-29 MIN  Patient PCP Information       Provider PCP Type    Alexsander Johnson DNP, FNP-C General            Reason for Visit / Chief Complaint: Non-healing Wound       History of Present Illness / Problem Focused Workflow     Lorraine Brambila is a 68 yo wf referred by MARYANN Johnson NP for pressure injuries x 2 to thoracic and lumbar spine. Patient states she was hospitalized in March at [facility] for Bowel Obstruction and areas were red but not open when she was discharged but opened up after she returned home. States they have been using hydrogel with silver for a couple of months and that is was healing, but stalled, so saw Ms. Johnson last week. Culture was obtained which grew out Staph and pseudomonas. She had already been placed on Keflex and continues to take it and Ms. Johnson talked with Dr. Orellana and started her on gentamicin and bactroban ointment to use topically. She also has hx of kidney cancer with numerous bony metastases including spine and rib lesions. Her  said that oncology was aware of lesions but weren't r/t cancer and that the latest scan showed improvement.  Her last note from oncology dated 25 reads that radiology read her CT scan and saw significant progression on her cora lesiions , specifically T7 and L3, which are in close proximity to where her current wounds are. She continues with chemo: Opdivo with cabometryx monthly. Also xgeva for bony therapy. Note also says that referral would be made to radiation oncology for radiotherapy to T7 and L3 but this was not mentioned at visit and notes were revewed after patient left.     2025 Presents  for wound care. Not much change in either wound. Because of the way the bone is protruding out doubt it will cover without surgical intervention. Patient and  confirm that she had radiation to spine but her  says that those are not the places that received treatment. Will continue present therapy for now and see how healing progresses. Discussed need for Tino to help with healing. She goes back to oncologist, Dr. Harman, on 6/19 per patient hx. 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 Denies any problems with it.    6/9/25 Areas look clean but not much change- Mrs. Brambila does not want to see surgeon for wounds. He got her some Tino to drink so she has been doing this 1-2 times a day and she feels like she has gained a little weight. She has an appointment next Monday with Dr. Harman so will return to wound clinic in 2 weeks.    6/23/25 Wounds remain stable.     6/30/25 Wound to thoracic spine with green drainage so cultured it today. Wound to lumbar spine still with thick tan slough covering area so unable to assess how deep area is but patient has been having a lot of pain to both areas and generalized pain all over as well and unable to tolerate debridement. Protruding bone is a problem but she does not want to even discuss surgical debridement.    7/7/25 Culture from last week came back positive for pseudomonas and staph and both sensitive to cipro she has been on it since Thursday. There has not been any significant wound healing in either area - discussed that there were multiple reasons -- cancer primarily,  immunosuppression from chemo and steroids, nutrition, previous radiation, etc. And also the fact that the bone is protruding so far out and the tissue cannot cover it. I had mentioned surgical consult last week but I really don't think anything can be done surgically with all that she has going on and  there is a large chance of making things worse. Will attempt to contact Oncology Clinic and discuss case with them first. She has an appointment there on Monday for chemo.    7/21/25 Wound looks better this week with no further s/s of infection.Lumbar wound still covered with slough despite santyl but she cannot tolerate debridement. Both wounds are extremely painful. Will continue present treatment and continue with home health. Any healing is going to be very slow because of the cancer, the chemo, radiation to that area, steroids, nutrition status, and debility in general. Discussed this with MrsZenaida  and  Mr. Brambila and discussed that while we couldn't change most of the factors we can but discussed that we would continue to do everything thing we could to make conditions as favorable as possible to promote healing and we would also continue to monitor closely for infection or any other complications.    7/28/25  Patient c/o increased amount of pain to thoracic area/ wound. She does have some thick tan/ perhaps greenish discharge this week. Will go ahead and rx with cipro again  -- she said it upset her stomach some last time so will try decreasing the dose to 250 bid but increasing from 10 to 14 days and see if she tolerates it better. Encouraged her to talk with her oncologist about possibility of increasing her pain meds. She takes norco 5 mg prn and wears a duragesic patch but states she has been on the same dose for a while so hopefully he can tweak her meds to provide better relief without causing excessive sedation.    8/5/25 Continues to have worsening pain to thoracic area. Not able to get comfortable. Has appointment with Dr. Harman on Monday with repeat CT and Mr. Brambila said he was going to talk to him about stopping her cabometyx (chem) and see if that helps her wound heal. She has been taking cipro for wound infection (3rd time with abx b/c shortly after course is completed yellow/ light greenish  discharge returns (cultures x 2 were positive for pseudomonas - 1st time resistant to cipro so had 10 days of IV cefepime) but she had to take a break b/c of gi upset so hasn't been on it for 2-3 days.         Review of Systems     Review of Systems   Constitutional:  Positive for activity change and fatigue. Negative for appetite change.   HENT:  Negative for congestion, sneezing, sore throat and trouble swallowing.    Respiratory:  Negative for apnea, cough, chest tightness and shortness of breath.    Cardiovascular:  Negative for chest pain and leg swelling.   Gastrointestinal:  Negative for abdominal pain, nausea and vomiting.   Genitourinary:  Negative for dysuria, flank pain, hematuria and vaginal bleeding.   Skin:  Positive for wound. Negative for color change.   Neurological:  Positive for weakness and numbness. Negative for dizziness and seizures.        Fingers are numb, especially to left hand. Improved after radiation but getting worse again.   Psychiatric/Behavioral:  Negative for confusion.        Medical / Social / Family History     Past Medical History:   Diagnosis Date    COPD (chronic obstructive pulmonary disease)     Coronary artery disease     Fibromyalgia     History of non-ST elevation myocardial infarction (NSTEMI)     4/2019      Hyperlipidemia     Hypertension        Past Surgical History:   Procedure Laterality Date    CARDIAC CATHETERIZATION      HERNIA REPAIR      HYSTERECTOMY      OOPHORECTOMY         Social History  Ms. Lorraine Brambila  reports that she quit smoking about 6 years ago. Her smoking use included cigarettes. She started smoking about 56 years ago. She has a 50 pack-year smoking history. She has never used smokeless tobacco. She reports that she does not drink alcohol and does not use drugs.    Family History  Ms.'s Lorraine Brambila family history includes Heart attack in her father; Heart disease in her father.    Medications and Allergies     Medications  No  outpatient medications have been marked as taking for the 8/4/25 encounter (Office Visit) with Cande Nava FNP.       Allergies  Review of patient's allergies indicates:  No Known Allergies    Physical Examination     Vitals:    08/04/25 1336   BP: 102/74   Pulse: 85   Resp: 18   Temp: 97.5 °F (36.4 °C)       Physical Exam  Constitutional:       Appearance: She is ill-appearing.      Comments: Thin and frail    HENT:      Right Ear: External ear normal.      Left Ear: External ear normal.      Nose: Nose normal.      Mouth/Throat:      Mouth: Mucous membranes are moist.   Pulmonary:      Effort: Pulmonary effort is normal.   Musculoskeletal:      Right lower leg: No edema.      Left lower leg: No edema.      Comments: See LDA for details    Moderate amount of tenderness present above thoracic wound- left>right   Skin:     Coloration: Skin is pale.      Comments: See LDA for details   Neurological:      Mental Status: She is alert and oriented to person, place, and time.      Motor: Weakness present.      Gait: Gait abnormal.   Psychiatric:         Mood and Affect: Mood normal.         Thought Content: Thought content normal.         Assessment and Plan                Wound 05/28/25 1342 Pressure Injury medial Thoracic spine #1 (Active)   05/28/25 1342 Thoracic spine   Present on Original Admission: Y   Primary Wound Type: Pressure inj   Side:    Orientation: medial   Wound Approximate Age at First Assessment (Weeks):    Wound Number: #1   Is this injury device related?: No   Incision Type:    Closure Method:    Wound Description (Comments):    Type:    Additional Comments:    Ankle-Brachial Index:    Pulses:    Removal Indication and Assessment: infection   Wound Outcome:    Wound Image   08/04/25 1418   Drainage Amount Large 08/04/25 1339   Drainage Characteristics/Odor Purulent;[place];Serosanguineous 08/04/25 1339   Appearance Purple;Red;[place] 08/04/25 1339   Periwound Area Purple;Moist 08/04/25 1339    Wound Length (cm) 6.5 cm 08/04/25 1339   Wound Width (cm) 2 cm 08/04/25 1339   Wound Depth (cm) 0.4 cm 08/04/25 1339   Wound Volume (cm^3) 2.723 cm^3 08/04/25 1339   Wound Surface Area (cm^2) 10.21 cm^2 08/04/25 1339   Supply Surface Area (L x W) 13 sq cm 08/04/25 1339   Undermining (depth (cm)/location) 12-12 08/04/25 1339   Care Cleansed with:;Wound cleanser 08/04/25 1418   Dressing Applied;Foam 08/04/25 1418   Number of days: 69            Wound 05/28/25 1345 Pressure Injury lower;medial Lumbar spine #2 (Active)   05/28/25 1345 Lumbar spine   Present on Original Admission: Y   Primary Wound Type: Pressure inj   Side:    Orientation: lower;medial   Wound Approximate Age at First Assessment (Weeks):    Wound Number: #2   Is this injury device related?: No   Incision Type:    Closure Method:    Wound Description (Comments):    Type:    Additional Comments:    Ankle-Brachial Index:    Pulses:    Removal Indication and Assessment: infection   Wound Outcome:    Wound Image   08/04/25 1419   Drainage Characteristics/Odor Sanguineous 08/04/25 1341   Periwound Area [place] 08/04/25 1341   Wound Length (cm) 1 cm 08/04/25 1341   Wound Width (cm) 1.5 cm 08/04/25 1341   Wound Depth (cm) 0.1 cm 08/04/25 1341   Wound Volume (cm^3) 0.079 cm^3 08/04/25 1341   Wound Surface Area (cm^2) 1.18 cm^2 08/04/25 1341   Supply Surface Area (L x W) 1.5 sq cm 08/04/25 1341   Care Cleansed with:;Wound cleanser 08/04/25 1419   Dressing Foam 08/04/25 1419   Number of days: 69          Problem List Items Addressed This Visit       Decubitus ulcer of back, stage 4 - Primary    Current Assessment & Plan   Wound care instructions for stage 4 decubitus (thoracic spine):  Clean wound with vashe. Apply medihoney gel to wound including gentle application of small amount underneath periwound  to areas of undermining.Cover with adaptic (be sure it covers bone), then apply foam border gauze.  Wound care to be done on Monday, Wednesday, and Friday and more  often prn for soiling or discomfort.      Home health to see patient 2x weekly and she will be seen once weekly at wound clinic     Nutrition  Supplement with daily multivitamin.  Nutritional supplement: Boost, Ensure, Tino     Hand Hygiene/Smoking Cessation  Wash hands before and after wound care. Call the Wound Center at [phone] if you have signs or symptoms of infection, increased drainage, increasing odor, or unusual redness. After Wound Care hours, please notify your PCP or go to the ER.          Pressure injury of back, unstageable    Current Assessment & Plan   Daily wound care instructions: Clean with vashe and apply santyl nickel thick to lumbar spine decubitus, then cover with foam border dressing like mepilex.     Nutrition  Supplement with daily multivitamin.  Nutritional supplement: Boost, Ensure, Tino     Hand Hygiene/Smoking Cessation  Wash hands before and after wound care. Call the Wound Center at [phone] if you have signs or symptoms of infection, increased drainage, increasing odor, or unusual redness. After Wound Care hours, please notify your PCP or go to the ER.         Her wounds are not healing but several factors preventing this: cancer, chemo, steroids, nutrition, severe illness,frequent wound infections, mets to spine - s/p radiation (but her  says could not be from radiation because the area where wound isn't where she had the radiation). Also she cannot tolerate hardly any manipulation of the wound bed - even cleaning it causes a lot of pain. I don't think she would be a candidate for any surgical intervention because of above. She is not a candidate for hyperbarics. She would not be able to tolerate wound vac because of her pain. At this point the best thing may be to continue with 3x weekly dressing changes and keeping the wound clean and free from infection as much as possible and finding a way to lessen her pain. We will see what the scans show next week and also  what Dr. Harman says and go from there.       Future Appointments   Date Time Provider Department Center   8/18/2025  1:00 PM Cande Nava FNP [facility]            Signature:  GIUSEPPE [place]o  [facility]  [address] [place] [id number]  [phone]    Date of encounter: 8/4/25

## 2025-08-03 NOTE — ASSESSMENT & PLAN NOTE
7/28/25  Daily wound care instructions: Clean with vashe and apply santyl nickel thick to lumbar spine decubitus, then cover with foam border dressing like mepilex.     Nutrition  Supplement with daily multivitamin.  Nutritional supplement: Boost, Ensure, Tino     Hand Hygiene/Smoking Cessation  Wash hands before and after wound care. Call the Wound Center at [phone] if you have signs or symptoms of infection, increased drainage, increasing odor, or unusual redness. After Wound Care hours, please notify your PCP or go to the ER.

## 2025-08-04 ENCOUNTER — OFFICE VISIT (OUTPATIENT)
Dept: WOUND CARE | Facility: HOSPITAL | Age: 70
End: 2025-08-04
Attending: NURSE PRACTITIONER
Payer: MEDICARE

## 2025-08-04 VITALS
TEMPERATURE: 98 F | DIASTOLIC BLOOD PRESSURE: 74 MMHG | RESPIRATION RATE: 18 BRPM | HEART RATE: 85 BPM | OXYGEN SATURATION: 95 % | SYSTOLIC BLOOD PRESSURE: 102 MMHG

## 2025-08-04 DIAGNOSIS — L89.100 PRESSURE INJURY OF BACK, UNSTAGEABLE: ICD-10-CM

## 2025-08-04 DIAGNOSIS — L89.104 DECUBITUS ULCER OF BACK, STAGE 4: Primary | ICD-10-CM

## 2025-08-04 PROCEDURE — 11042 DBRDMT SUBQ TIS 1ST 20SQCM/<: CPT

## 2025-08-04 PROCEDURE — 99213 OFFICE O/P EST LOW 20 MIN: CPT | Performed by: NURSE PRACTITIONER

## 2025-08-04 NOTE — PATIENT INSTRUCTIONS
Decubitus ulcer of back, stage 4 - Primary      Current Assessment & Plan   Wound care instructions for stage 4 decubitus (thoracic spine):  Clean wound with vashe. Apply medihoney gel to wound including gentle application of small amount underneath periwound  to areas of undermining.Cover with adaptic (be sure it covers bone), then apply foam border gauze.  Wound care to be done on Monday, Wednesday, and Friday and more often prn for soiling or discomfort.      Home health to see patient 2x weekly and she will be seen once weekly at wound clinic     Nutrition  Supplement with daily multivitamin.  Nutritional supplement: Boost, Ensure, Tino     Hand Hygiene/Smoking Cessation  Wash hands before and after wound care. Call the Wound Center at [phone] if you have signs or symptoms of infection, increased drainage, increasing odor, or unusual redness. After Wound Care hours, please notify your PCP or go to the ER.            Pressure injury of back, unstageable     Current Assessment & Plan   7/28/25  Daily wound care instructions: Clean with vashe and apply santyl nickel thick to lumbar spine decubitus, then cover with foam border dressing like mepilex.     Nutrition  Supplement with daily multivitamin.  Nutritional supplement: Boost, Ensure, Tino     Hand Hygiene/Smoking Cessation  Wash hands before and after wound care. Call the Wound Center at [phone] if you have signs or symptoms of infection, increased drainage, increasing odor, or unusual redness. After Wound Care hours, please notify your PCP or go to the ER.

## 2025-08-05 ENCOUNTER — LAB REQUISITION (OUTPATIENT)
Dept: LAB | Facility: HOSPITAL | Age: 70
End: 2025-08-05
Attending: NURSE PRACTITIONER
Payer: MEDICARE

## 2025-08-05 ENCOUNTER — TELEPHONE (OUTPATIENT)
Dept: PRIMARY CARE CLINIC | Facility: CLINIC | Age: 70
End: 2025-08-05
Payer: MEDICARE

## 2025-08-05 ENCOUNTER — TELEPHONE (OUTPATIENT)
Dept: CARDIOLOGY | Facility: CLINIC | Age: 70
End: 2025-08-05
Payer: MEDICARE

## 2025-08-05 DIAGNOSIS — B96.5 PSEUDOMONAS (AERUGINOSA) (MALLEI) (PSEUDOMALLEI) AS THE CAUSE OF DISEASES CLASSIFIED ELSEWHERE: ICD-10-CM

## 2025-08-05 DIAGNOSIS — B95.61 METHICILLIN SUSCEPTIBLE STAPHYLOCOCCUS AUREUS INFECTION AS THE CAUSE OF DISEASES CLASSIFIED ELSEWHERE: ICD-10-CM

## 2025-08-05 LAB
ALBUMIN SERPL BCP-MCNC: 2.6 G/DL (ref 3.4–4.8)
ALBUMIN/GLOB SERPL: 0.7 {RATIO}
ALP SERPL-CCNC: 143 U/L (ref 40–150)
ALT SERPL W P-5'-P-CCNC: 43 U/L
ANION GAP SERPL CALCULATED.3IONS-SCNC: 15 MMOL/L (ref 7–16)
AST SERPL W P-5'-P-CCNC: 38 U/L (ref 11–45)
BASOPHILS # BLD AUTO: 0.02 K/UL (ref 0–0.2)
BASOPHILS NFR BLD AUTO: 0.3 % (ref 0–1)
BILIRUB SERPL-MCNC: 0.4 MG/DL
BILIRUB UR QL STRIP: NEGATIVE
BUN SERPL-MCNC: 23 MG/DL (ref 10–20)
BUN/CREAT SERPL: 32 (ref 6–20)
CALCIUM SERPL-MCNC: 9.5 MG/DL (ref 8.4–10.2)
CHLORIDE SERPL-SCNC: 97 MMOL/L (ref 98–107)
CLARITY UR: NORMAL
CO2 SERPL-SCNC: 30 MMOL/L (ref 23–31)
COLOR UR: YELLOW
CREAT SERPL-MCNC: 0.72 MG/DL (ref 0.55–1.02)
DIFFERENTIAL METHOD BLD: ABNORMAL
EGFR (NO RACE VARIABLE) (RUSH/TITUS): 90 ML/MIN/1.73M2
EOSINOPHIL # BLD AUTO: 0.05 K/UL (ref 0–0.5)
EOSINOPHIL NFR BLD AUTO: 0.8 % (ref 1–4)
ERYTHROCYTE [DISTWIDTH] IN BLOOD BY AUTOMATED COUNT: 15.2 % (ref 11.5–14.5)
GLOBULIN SER-MCNC: 3.8 G/DL (ref 2–4)
GLUCOSE SERPL-MCNC: 96 MG/DL (ref 82–115)
GLUCOSE UR STRIP-MCNC: NEGATIVE MG/DL
HCT VFR BLD AUTO: 36.2 % (ref 38–47)
HGB BLD-MCNC: 12.1 G/DL (ref 12–16)
IMM GRANULOCYTES # BLD AUTO: 0.05 K/UL (ref 0–0.04)
IMM GRANULOCYTES NFR BLD: 0.8 % (ref 0–0.4)
INFLUENZA A MOLECULAR (OHS): NEGATIVE
INFLUENZA B MOLECULAR (OHS): NEGATIVE
KETONES UR STRIP-SCNC: NEGATIVE MG/DL
LEUKOCYTE ESTERASE UR QL STRIP: NEGATIVE
LYMPHOCYTES # BLD AUTO: 1.01 K/UL (ref 1–4.8)
LYMPHOCYTES NFR BLD AUTO: 16.9 % (ref 27–41)
MCH RBC QN AUTO: 34.3 PG (ref 27–31)
MCHC RBC AUTO-ENTMCNC: 33.4 G/DL (ref 32–36)
MCV RBC AUTO: 102.5 FL (ref 80–96)
MONOCYTES # BLD AUTO: 0.25 K/UL (ref 0–0.8)
MONOCYTES NFR BLD AUTO: 4.2 % (ref 2–6)
MPC BLD CALC-MCNC: 8.6 FL (ref 9.4–12.4)
NEUTROPHILS # BLD AUTO: 4.59 K/UL (ref 1.8–7.7)
NEUTROPHILS NFR BLD AUTO: 77 % (ref 53–65)
NITRITE UR QL STRIP: NEGATIVE
NRBC # BLD AUTO: 0 X10E3/UL
NRBC, AUTO (.00): 0 %
PH UR STRIP: 7 PH UNITS
PLATELET # BLD AUTO: 204 K/UL (ref 150–400)
POTASSIUM SERPL-SCNC: 4.4 MMOL/L (ref 3.5–5.1)
PROT SERPL-MCNC: 6.4 G/DL (ref 5.8–7.6)
PROT UR QL STRIP: NEGATIVE
RBC # BLD AUTO: 3.53 M/UL (ref 4.2–5.4)
RBC # UR STRIP: NEGATIVE /UL
SARS-COV-2 RDRP RESP QL NAA+PROBE: NEGATIVE
SODIUM SERPL-SCNC: 138 MMOL/L (ref 136–145)
SP GR UR STRIP: 1.02
UROBILINOGEN UR STRIP-ACNC: 0.2 MG/DL
WBC # BLD AUTO: 5.97 K/UL (ref 4.5–11)

## 2025-08-05 PROCEDURE — 85025 COMPLETE CBC W/AUTO DIFF WBC: CPT | Performed by: NURSE PRACTITIONER

## 2025-08-05 PROCEDURE — 87502 INFLUENZA DNA AMP PROBE: CPT | Performed by: NURSE PRACTITIONER

## 2025-08-05 PROCEDURE — 80053 COMPREHEN METABOLIC PANEL: CPT | Performed by: NURSE PRACTITIONER

## 2025-08-05 PROCEDURE — 81003 URINALYSIS AUTO W/O SCOPE: CPT | Performed by: NURSE PRACTITIONER

## 2025-08-05 PROCEDURE — 87086 URINE CULTURE/COLONY COUNT: CPT | Performed by: NURSE PRACTITIONER

## 2025-08-05 PROCEDURE — 87635 SARS-COV-2 COVID-19 AMP PRB: CPT | Performed by: NURSE PRACTITIONER

## 2025-08-05 NOTE — TELEPHONE ENCOUNTER
Copied from CRM #[id number]. Topic: General Inquiry - Return Call  >> Aug 5, 2025 11:25 JAMES Lyons wrote:  Who Called: Keily (mSeller)    Caller is requesting assistance/information from provider's office.          Preferred Method of Contact: Phone Call  Patient's Preferred Phone Number on File: [phone]   Best Call Back Number, if different: [phone] Keily (DDN)  Additional Information: Keily (mSeller) is requesting a call back regarding this patient in reference to getting orders for the patient.

## 2025-08-05 NOTE — TELEPHONE ENCOUNTER
Copied from CRM #[id number]. Topic: Appointments - Amb Referral  >> Aug 5, 2025 10:31 AM Stephania wrote:  Who Called: Keily Cosme     What order is the patient requesting: Lab orders  When does the expect the orders to be performed? ASAP     The patient is running fever, in pain, has open wounds, and urine retention. She is wanting permission to have some lab work done to find out what's going on with her. She is also wanting to speak to someone if possible     Patient's Preferred Phone Number on File: [phone]

## 2025-08-06 RX ORDER — CLOTRIMAZOLE AND BETAMETHASONE DIPROPIONATE 10; .64 MG/G; MG/G
CREAM TOPICAL 2 TIMES DAILY
Qty: 45 G | Refills: 0 | Status: SHIPPED | OUTPATIENT
Start: 2025-08-06

## 2025-08-07 LAB — UA COMPLETE W REFLEX CULTURE PNL UR: NORMAL

## 2025-08-14 ENCOUNTER — LAB REQUISITION (OUTPATIENT)
Dept: LAB | Facility: HOSPITAL | Age: 70
End: 2025-08-14
Attending: NURSE PRACTITIONER
Payer: MEDICARE

## 2025-08-14 DIAGNOSIS — C79.51 SECONDARY MALIGNANT NEOPLASM OF BONE: ICD-10-CM

## 2025-08-14 LAB — PREALB SERPL NEPH-MCNC: <3 MG/DL (ref 14–37)

## 2025-08-14 PROCEDURE — 84134 ASSAY OF PREALBUMIN: CPT | Performed by: NURSE PRACTITIONER
